# Patient Record
Sex: MALE | Race: WHITE | NOT HISPANIC OR LATINO
[De-identification: names, ages, dates, MRNs, and addresses within clinical notes are randomized per-mention and may not be internally consistent; named-entity substitution may affect disease eponyms.]

---

## 2017-02-22 ENCOUNTER — RX ONLY (OUTPATIENT)
Age: 77
Setting detail: RX ONLY
End: 2017-02-22

## 2017-02-22 PROBLEM — E11.9 TYPE 2 DIABETES MELLITUS WITHOUT COMPLICATIONS: Status: ACTIVE | Noted: 2017-02-22

## 2017-02-22 PROBLEM — C44.519 BASAL CELL CARCINOMA OF SKIN OF OTHER PART OF TRUNK: Status: ACTIVE | Noted: 2017-02-22

## 2017-02-22 PROBLEM — C44.319 BASAL CELL CARCINOMA OF SKIN OF OTHER PARTS OF FACE: Status: ACTIVE | Noted: 2017-02-22

## 2017-03-08 PROBLEM — D49.2 NEOPLASM OF UNSPECIFIED BEHAVIOR OF BONE, SOFT TISSUE, AND SKIN: Status: RESOLVED | Noted: 2017-02-22 | Resolved: 2017-03-08

## 2017-03-24 ENCOUNTER — OFFICE VISIT (OUTPATIENT)
Dept: CARDIOLOGY | Facility: MEDICAL CENTER | Age: 77
End: 2017-03-24
Payer: MEDICARE

## 2017-03-24 VITALS
WEIGHT: 236 LBS | DIASTOLIC BLOOD PRESSURE: 80 MMHG | HEIGHT: 72 IN | SYSTOLIC BLOOD PRESSURE: 130 MMHG | BODY MASS INDEX: 31.97 KG/M2 | HEART RATE: 70 BPM

## 2017-03-24 DIAGNOSIS — E78.2 MIXED HYPERLIPIDEMIA: ICD-10-CM

## 2017-03-24 DIAGNOSIS — I10 ESSENTIAL HYPERTENSION, BENIGN: ICD-10-CM

## 2017-03-24 DIAGNOSIS — R60.1 GENERALIZED EDEMA: ICD-10-CM

## 2017-03-24 DIAGNOSIS — I25.10 CORONARY ARTERY DISEASE INVOLVING NATIVE CORONARY ARTERY OF NATIVE HEART WITHOUT ANGINA PECTORIS: ICD-10-CM

## 2017-03-24 PROCEDURE — G8598 ASA/ANTIPLAT THER USED: HCPCS | Performed by: INTERNAL MEDICINE

## 2017-03-24 PROCEDURE — G8432 DEP SCR NOT DOC, RNG: HCPCS | Performed by: INTERNAL MEDICINE

## 2017-03-24 PROCEDURE — 4040F PNEUMOC VAC/ADMIN/RCVD: CPT | Performed by: INTERNAL MEDICINE

## 2017-03-24 PROCEDURE — 1101F PT FALLS ASSESS-DOCD LE1/YR: CPT | Mod: 8P | Performed by: INTERNAL MEDICINE

## 2017-03-24 PROCEDURE — 99214 OFFICE O/P EST MOD 30 MIN: CPT | Performed by: INTERNAL MEDICINE

## 2017-03-24 PROCEDURE — G8417 CALC BMI ABV UP PARAM F/U: HCPCS | Performed by: INTERNAL MEDICINE

## 2017-03-24 PROCEDURE — 3017F COLORECTAL CA SCREEN DOC REV: CPT | Mod: 8P | Performed by: INTERNAL MEDICINE

## 2017-03-24 PROCEDURE — 1036F TOBACCO NON-USER: CPT | Performed by: INTERNAL MEDICINE

## 2017-03-24 PROCEDURE — G8484 FLU IMMUNIZE NO ADMIN: HCPCS | Performed by: INTERNAL MEDICINE

## 2017-03-24 ASSESSMENT — ENCOUNTER SYMPTOMS
PALPITATIONS: 0
SHORTNESS OF BREATH: 1
HEARTBURN: 0
DIZZINESS: 0
BRUISES/BLEEDS EASILY: 0
BLOOD IN STOOL: 0
HEADACHES: 0
BLURRED VISION: 0
FEVER: 0
DEPRESSION: 0
NECK PAIN: 0
MYALGIAS: 0

## 2017-03-24 NOTE — Clinical Note
[facility]   [address] [place] [address] [address]  [place] [address]  Phone: [phone]  Fax: [phone]              David Figueroa  1940    Encounter Date: 3/24/2017    Khang Stanley M.D.          PROGRESS NOTE:  Subjective:   David Figueroa is a 76 y.o. male who presents today for follow-up of multivessel coronary disease by angiography in March 2015. At that time, he was found to have inoperable CAD do due to lack of targets. The patient has had no angina at all. He is not physically active. He does have problems with dependent edema but it sounds like he drinks a significant amount of fluids at home. He is due for lab testing as well.  The patient finally saw dermatologist to get his squamous cell cancers removed.    Past Medical History   Diagnosis Date   • Hypertension    • Hyperlipidemia    • Paroxysmal atrial fibrillation    • Emphysema      O2 3 liters prn   • Hiatus hernia syndrome    • Asthma    • Other emphysema    • Snoring    • Diabetes      oral meds   • Dental disorder      upper lower   • Pneumonia 2013   • High cholesterol    • Cold 1/2015   • Breath shortness      O2  3 l/m at night   • Stroke 1999   • Weakness      since pneumonia     Past Surgical History   Procedure Laterality Date   • Carotid endarterectomy  9/17/2014     Performed by Willy Solares M.D. at SURGERY [facility]   • Recovery  3/4/2015     Performed by Cath-Recovery Surgery at SURGERY SAME DAY [facility] ORS     No family history on file.  History   Smoking status   • Former Smoker -- 2.00 packs/day for 55 years   • Types: Cigarettes   • Quit date: 07/01/2013   Smokeless tobacco   • Never Used     Allergies   Allergen Reactions   • Other Misc Rash     Cat Hair     Outpatient Encounter Prescriptions as of 3/24/2017   Medication Sig Dispense Refill   • carvedilol (COREG) 3.125 MG Tab Take 1 Tab by mouth 2 times a day, with meals. 180 Tab 3   • metformin ER (GLUCOPHAGE XR)  750 MG TABLET SR 24 HR Take 750 mg by mouth every day.     • potassium chloride SA (K-DUR) 10 MEQ Tab CR Take 1 Tab by mouth every day. TAKE 1 TAB BY MOUTH EVERY DAY. 90 Tab 3   • folic acid (FOLVITE) 400 MCG tablet Take 800 mcg by mouth.  3   • glipiZIDE (GLUCOTROL) 5 MG TABS Take 5 mg by mouth every morning.     • furosemide (LASIX) 40 MG TABS Take 40 mg by mouth every day.     • Cholecalciferol (VITAMIN D-3) 1000 UNITS CAPS Take 1 Cap by mouth every day.     • aspirin 81 MG tablet Take 81 mg by mouth every day.     • atorvastatin (LIPITOR) 10 MG TABS Take 1 Tab by mouth every day. 90 Tab 3   • lisinopril (PRINIVIL) 5 MG TABS Take 1 Tab by mouth every day. 90 Tab 3   • metformin (GLUCOPHAGE) 500 MG TABS Take 500 mg by mouth 2 times a day.     • famotidine (PEPCID) 20 MG TABS Take 20 mg by mouth every day.     • Arformoterol Tartrate (BROVANA) 15 MCG/2ML NEBU Inhale  by mouth. Indications: Emphysema     • budesonide (PULMICORT) 0.25 MG/2ML SUSP 250 mcg by Nebulization route 2 times a day. Indications: Chronic Obstructive Lung Disease     • albuterol (PROVENTIL) 2.5mg/3ml NEBU 2.5 mg by Nebulization route every four hours as needed.       No facility-administered encounter medications on file as of 3/24/2017.     Review of Systems   Constitutional: Positive for malaise/fatigue. Negative for fever.   HENT: Positive for hearing loss.    Eyes: Negative for blurred vision.   Respiratory: Positive for shortness of breath.    Cardiovascular: Positive for leg swelling. Negative for chest pain and palpitations.   Gastrointestinal: Negative for heartburn and blood in stool.   Genitourinary: Negative for hematuria.   Musculoskeletal: Negative for myalgias and neck pain.   Skin: Negative for rash.   Neurological: Negative for dizziness and headaches.   Endo/Heme/Allergies: Does not bruise/bleed easily.   Psychiatric/Behavioral: Negative for depression.        Objective:   /80 mmHg  Pulse 70  Ht 1.829 m (6')  Wt  107.049 kg (236 lb)  BMI 32.00 kg/m2    Physical Exam    Assessment:     1. Coronary artery disease involving native coronary artery of native heart without angina pectoris  LIPID PROFILE    COMP METABOLIC PANEL    CBC WITH DIFFERENTIAL   2. Essential hypertension, benign  LIPID PROFILE    COMP METABOLIC PANEL    CBC WITH DIFFERENTIAL   3. Generalized edema  COMP METABOLIC PANEL    CBC WITH DIFFERENTIAL   4. Mixed hyperlipidemia  COMP METABOLIC PANEL    CBC WITH DIFFERENTIAL       Medical Decision Making:  Today's Assessment / Status / Plan:     He is doing well without any angina. He is due for laboratory testing. Would recommend a target LDL of 70 or less severity of his coronary artery disease. His blood pressures currently under good control. Again, the patient has inoperable CAD. Return 6 months.      Michael Felder M.D.  [address] [facility]  [address] [place] [address]  VIA Facsimile: [phone]

## 2017-03-24 NOTE — PROGRESS NOTES
Subjective:   David Figueroa is a 76 y.o. male who presents today for follow-up of multivessel coronary disease by angiography in March 2015. At that time, he was found to have inoperable CAD do due to lack of targets. The patient has had no angina at all. He is not physically active. He does have problems with dependent edema but it sounds like he drinks a significant amount of fluids at home. He is due for lab testing as well.  The patient finally saw dermatologist to get his squamous cell cancers removed.    Past Medical History   Diagnosis Date   • Hypertension    • Hyperlipidemia    • Paroxysmal atrial fibrillation    • Emphysema      O2 3 liters prn   • Hiatus hernia syndrome    • Asthma    • Other emphysema    • Snoring    • Diabetes      oral meds   • Dental disorder      upper lower   • Pneumonia 2013   • High cholesterol    • Cold 1/2015   • Breath shortness      O2  3 l/m at night   • Stroke 1999   • Weakness      since pneumonia     Past Surgical History   Procedure Laterality Date   • Carotid endarterectomy  9/17/2014     Performed by Willy Solares M.D. at SURGERY [facility] ORS   • Recovery  3/4/2015     Performed by Cath-Recovery Surgery at SURGERY SAME DAY [facility] ORS     No family history on file.  History   Smoking status   • Former Smoker -- 2.00 packs/day for 55 years   • Types: Cigarettes   • Quit date: 07/01/2013   Smokeless tobacco   • Never Used     Allergies   Allergen Reactions   • Other Misc Rash     Cat Hair     Outpatient Encounter Prescriptions as of 3/24/2017   Medication Sig Dispense Refill   • carvedilol (COREG) 3.125 MG Tab Take 1 Tab by mouth 2 times a day, with meals. 180 Tab 3   • metformin ER (GLUCOPHAGE XR) 750 MG TABLET SR 24 HR Take 750 mg by mouth every day.     • potassium chloride SA (K-DUR) 10 MEQ Tab CR Take 1 Tab by mouth every day. TAKE 1 TAB BY MOUTH EVERY DAY. 90 Tab 3   • folic acid (FOLVITE) 400 MCG tablet Take 800 mcg by mouth.  3   • glipiZIDE  (GLUCOTROL) 5 MG TABS Take 5 mg by mouth every morning.     • furosemide (LASIX) 40 MG TABS Take 40 mg by mouth every day.     • Cholecalciferol (VITAMIN D-3) 1000 UNITS CAPS Take 1 Cap by mouth every day.     • aspirin 81 MG tablet Take 81 mg by mouth every day.     • atorvastatin (LIPITOR) 10 MG TABS Take 1 Tab by mouth every day. 90 Tab 3   • lisinopril (PRINIVIL) 5 MG TABS Take 1 Tab by mouth every day. 90 Tab 3   • metformin (GLUCOPHAGE) 500 MG TABS Take 500 mg by mouth 2 times a day.     • famotidine (PEPCID) 20 MG TABS Take 20 mg by mouth every day.     • Arformoterol Tartrate (BROVANA) 15 MCG/2ML NEBU Inhale  by mouth. Indications: Emphysema     • budesonide (PULMICORT) 0.25 MG/2ML SUSP 250 mcg by Nebulization route 2 times a day. Indications: Chronic Obstructive Lung Disease     • albuterol (PROVENTIL) 2.5mg/3ml NEBU 2.5 mg by Nebulization route every four hours as needed.       No facility-administered encounter medications on file as of 3/24/2017.     Review of Systems   Constitutional: Positive for malaise/fatigue. Negative for fever.   HENT: Positive for hearing loss.    Eyes: Negative for blurred vision.   Respiratory: Positive for shortness of breath.    Cardiovascular: Positive for leg swelling. Negative for chest pain and palpitations.   Gastrointestinal: Negative for heartburn and blood in stool.   Genitourinary: Negative for hematuria.   Musculoskeletal: Negative for myalgias and neck pain.   Skin: Negative for rash.   Neurological: Negative for dizziness and headaches.   Endo/Heme/Allergies: Does not bruise/bleed easily.   Psychiatric/Behavioral: Negative for depression.        Objective:   /80 mmHg  Pulse 70  Ht 1.829 m (6')  Wt 107.049 kg (236 lb)  BMI 32.00 kg/m2    Physical Exam    Assessment:     1. Coronary artery disease involving native coronary artery of native heart without angina pectoris  LIPID PROFILE    COMP METABOLIC PANEL    CBC WITH DIFFERENTIAL   2. Essential  hypertension, benign  LIPID PROFILE    COMP METABOLIC PANEL    CBC WITH DIFFERENTIAL   3. Generalized edema  COMP METABOLIC PANEL    CBC WITH DIFFERENTIAL   4. Mixed hyperlipidemia  COMP METABOLIC PANEL    CBC WITH DIFFERENTIAL       Medical Decision Making:  Today's Assessment / Status / Plan:     He is doing well without any angina. He is due for laboratory testing. Would recommend a target LDL of 70 or less severity of his coronary artery disease. His blood pressures currently under good control. Again, the patient has inoperable CAD. Return 6 months.

## 2017-03-24 NOTE — MR AVS SNAPSHOT
David Figueroa   3/24/2017 1:00 PM   Office Visit   MRN: [id number]    Department:  [facility]   Dept Phone:  [phone]    Description:  Male : 1940   Provider:  Khang Stanley M.D.           Reason for Visit     Follow-Up           Allergies as of 3/24/2017     Allergen Noted Reactions    Other Misc 2015   Rash    Cat Hair      You were diagnosed with     Coronary artery disease involving native coronary artery of native heart without angina pectoris   [8140939]       Essential hypertension, benign   [401.1.ICD-9-CM]       Generalized edema   [035633]       Mixed hyperlipidemia   [272.2.ICD-9-CM]         Vital Signs     Blood Pressure Pulse Height Weight Body Mass Index Smoking Status    130/80 mmHg 70 1.829 m (6') 107.049 kg (236 lb) 32.00 kg/m2 Former Smoker      Basic Information     Date Of Birth Sex Race Ethnicity Preferred Language    1940 Male White Non- English      Your appointments     Oct 10, 2017  1:40 PM   FOLLOW UP with Kahng Stanley M.D.   [facility] (--)    [address], [address]  [facility] [address]   [phone]              Problem List              ICD-10-CM Priority Class Noted - Resolved    PAF (paroxysmal atrial fibrillation) (CMS-HCC) I48.0   2013 - Present    Nonspecific abnormal electrocardiogram (ECG) (EKG) R94.31   2013 - Present    Essential hypertension, benign I10   2013 - Present    Diabetes mellitus type 2, noninsulin dependent (CMS-HCC) E11.9   2013 - Present    CVA, old, facial weakness I69.392   2013 - Present    H/O: pneumonia Z87.01   2013 - Present    COPD (chronic obstructive pulmonary disease) (CMS-HCC) J44.9   2014 - Present    Edema R60.9   2014 - Present    Mixed hyperlipidemia E78.2   2014 - Present    Lung nodule R91.1   2014 - Present    Carotid artery bruit R09.89   2014 - Present    Occlusion and stenosis of carotid artery  without mention of cerebral infarction I65.29   9/17/2014 - Present    Other nonspecific abnormal cardiovascular system function study R94.39   3/4/2015 - Present    CAD (coronary artery disease) I25.10   7/1/2015 - Present    Skin cancer of trunk C44.599   9/27/2016 - Present      Health Maintenance        Date Due Completion Dates    A1C SCREENING 1940 ---    DIABETES MONOFILAMENT / LE EXAM 1940 ---    RETINAL SCREENING 6/3/1960 ---    URINE ACR / MICROALBUMIN 6/3/1960 ---    IMM DTaP/Tdap/Td Vaccine (1 - Tdap) 6/3/1961 ---    COLONOSCOPY 6/3/1992 ---    IMM ZOSTER VACCINE 6/3/2002 ---    IMM PNEUMOCOCCAL 65+ (ADULT) LOW/MEDIUM RISK SERIES (2 of 2 - PCV13) 9/18/2013 9/18/2012    FASTING LIPID PROFILE 7/16/2016 7/16/2015    SERUM CREATININE 7/16/2016 7/16/2015, 3/3/2015, 9/11/2014    IMM INFLUENZA (1) 9/1/2016 ---            Current Immunizations     Pneumococcal polysaccharide vaccine (PPSV-23) 9/18/2012      Below and/or attached are the medications your provider expects you to take. Review all of your home medications and newly ordered medications with your provider and/or pharmacist. Follow medication instructions as directed by your provider and/or pharmacist. Please keep your medication list with you and share with your provider. Update the information when medications are discontinued, doses are changed, or new medications (including over-the-counter products) are added; and carry medication information at all times in the event of emergency situations     Allergies:  OTHER MISC - Rash               Medications  Valid as of: March 24, 2017 -  1:38 PM    Generic Name Brand Name Tablet Size Instructions for use    Albuterol Sulfate (Nebu Soln) PROVENTIL 2.5mg/3ml 2.5 mg by Nebulization route every four hours as needed.        Arformoterol Tartrate (Nebu Soln) Arformoterol Tartrate 15 MCG/2ML Inhale  by mouth. Indications: Emphysema        Aspirin (Tab) aspirin 81 MG Take 81 mg by mouth every day.         Atorvastatin Calcium (Tab) LIPITOR 10 MG Take 1 Tab by mouth every day.        Budesonide (Suspension) PULMICORT 0.25 MG/2ML 250 mcg by Nebulization route 2 times a day. Indications: Chronic Obstructive Lung Disease        Carvedilol (Tab) COREG 3.125 MG Take 1 Tab by mouth 2 times a day, with meals.        Cholecalciferol (Cap) Vitamin D-3 1000 UNITS Take 1 Cap by mouth every day.        Famotidine (Tab) PEPCID 20 MG Take 20 mg by mouth every day.        Folic Acid (Tab) FOLVITE 400 MCG Take 800 mcg by mouth.        Furosemide (Tab) LASIX 40 MG Take 40 mg by mouth every day.        GlipiZIDE (Tab) GLUCOTROL 5 MG Take 5 mg by mouth every morning.        Lisinopril (Tab) PRINIVIL 5 MG Take 1 Tab by mouth every day.        MetFORMIN HCl (Tab) GLUCOPHAGE 500 MG Take 500 mg by mouth 2 times a day.        MetFORMIN HCl (TABLET SR 24 HR) GLUCOPHAGE  MG Take 750 mg by mouth every day.        Potassium Chloride Nishi CR (Tab CR) K-DUR 10 MEQ Take 1 Tab by mouth every day. TAKE 1 TAB BY MOUTH EVERY DAY.        .                 Medicines prescribed today were sent to:     [facility] [address] [address]    Phone: [phone] Fax: [phone]    Open 24 Hours?: No      Medication refill instructions:       If your prescription bottle indicates you have medication refills left, it is not necessary to call your provider’s office. Please contact your pharmacy and they will refill your medication.    If your prescription bottle indicates you do not have any refills left, you may request refills at any time through one of the following ways: The online [employer] system (except Urgent Care), by calling your provider’s office, or by asking your pharmacy to contact your provider’s office with a refill request. Medication refills are processed only during regular business hours and may not be available until the next business day. Your provider  may request additional information or to have a follow-up visit with you prior to refilling your medication.   *Please Note: Medication refills are assigned a new Rx number when refilled electronically. Your pharmacy may indicate that no refills were authorized even though a new prescription for the same medication is available at the pharmacy. Please request the medicine by name with the pharmacy before contacting your provider for a refill.        Your To Do List     Future Labs/Procedures Complete By Expires    CBC WITH DIFFERENTIAL  As directed 9/23/2017    COMP METABOLIC PANEL  As directed 9/23/2017    LIPID PROFILE  As directed 9/23/2017         [employer] Network Access Code: [email or web address]  Expires: 4/23/2017  1:38 PM    [employer] Network  A secure, online tool to manage your health information     [employer]’s [employer] Network® is a secure, online tool that connects you to your personalized health information from the privacy of your home -- day or night - making it very easy for you to manage your healthcare. Once the activation process is completed, you can even access your medical information using the Start[employer] Network panfilo, which is available for free in the Apple Panfilo store or Google Play store.     Start[employer] Network provides the following levels of access (as shown below):   My Chart Features   [facility] Doctor [facility]  Urgent  Care Non-[facility]  Primary Care  Doctor   Email your healthcare team securely and privately 24/7 X X X    Manage appointments: schedule your next appointment; view details of past/upcoming appointments X      Request prescription refills. X      View recent personal medical records, including lab and immunizations X X X X   View health record, including health history, allergies, medications X X X X   Read reports about your outpatient visits, procedures, consult and ER notes X X X X   See your discharge summary, which is a recap of your hospital and/or ER visit that includes your diagnosis, lab  results, and care plan. X X       How to register for [employer]ovation:  1. Go to  [email or web address].  2. Click on the Sign Up Now box, which takes you to the New Member Sign Up page. You will need to provide the following information:  a. Enter your [employer] Access Code exactly as it appears at the top of this page. (You will not need to use this code after you’ve completed the sign-up process. If you do not sign up before the expiration date, you must request a new code.)   b. Enter your date of birth.   c. Enter your home email address.   d. Click Submit, and follow the next screen’s instructions.  3. Create a [employer] ID. This will be your iovation login ID and cannot be changed, so think of one that is secure and easy to remember.  4. Create a [employer] password. You can change your password at any time.  5. Enter your Password Reset Question and Answer. This can be used at a later time if you forget your password.   6. Enter your e-mail address. This allows you to receive e-mail notifications when new information is available in iovation.  7. Click Sign Up. You can now view your health information.    For assistance activating your iovation account, call [phone]

## 2017-04-17 ENCOUNTER — RX ONLY (OUTPATIENT)
Age: 77
Setting detail: RX ONLY
End: 2017-04-17

## 2017-09-20 LAB
ALBUMIN SERPL-MCNC: 3.5 G/DL (ref 3.5–4.8)
ALBUMIN/GLOB SERPL: 1.3 {RATIO} (ref 1.2–2.2)
ALP SERPL-CCNC: 90 IU/L (ref 39–117)
ALT SERPL-CCNC: 9 IU/L (ref 0–44)
AST SERPL-CCNC: 13 IU/L (ref 0–40)
BASOPHILS # BLD AUTO: 0 X10E3/UL (ref 0–0.2)
BASOPHILS NFR BLD AUTO: 0 %
BILIRUB SERPL-MCNC: <0.2 MG/DL (ref 0–1.2)
BUN SERPL-MCNC: 25 MG/DL (ref 8–27)
BUN/CREAT SERPL: 21 (ref 10–24)
CALCIUM SERPL-MCNC: 9.2 MG/DL (ref 8.6–10.2)
CHLORIDE SERPL-SCNC: 101 MMOL/L (ref 96–106)
CHOLEST SERPL-MCNC: 147 MG/DL (ref 100–199)
CO2 SERPL-SCNC: 25 MMOL/L (ref 18–29)
COMMENT 011824: NORMAL
CREAT SERPL-MCNC: 1.17 MG/DL (ref 0.76–1.27)
EOSINOPHIL # BLD AUTO: 0.3 X10E3/UL (ref 0–0.4)
EOSINOPHIL NFR BLD AUTO: 4 %
ERYTHROCYTE [DISTWIDTH] IN BLOOD BY AUTOMATED COUNT: 17.4 % (ref 12.3–15.4)
GLOBULIN SER CALC-MCNC: 2.6 G/DL (ref 1.5–4.5)
GLUCOSE SERPL-MCNC: 94 MG/DL (ref 65–99)
HCT VFR BLD AUTO: 36.6 % (ref 37.5–51)
HDLC SERPL-MCNC: 43 MG/DL
HGB BLD-MCNC: 11.6 G/DL (ref 12.6–17.7)
IMM GRANULOCYTES # BLD: 0 X10E3/UL (ref 0–0.1)
IMM GRANULOCYTES NFR BLD: 0 %
IMMATURE CELLS  115398: ABNORMAL
LDLC SERPL CALC-MCNC: 76 MG/DL (ref 0–99)
LYMPHOCYTES # BLD AUTO: 2.8 X10E3/UL (ref 0.7–3.1)
LYMPHOCYTES NFR BLD AUTO: 35 %
MCH RBC QN AUTO: 29.7 PG (ref 26.6–33)
MCHC RBC AUTO-ENTMCNC: 31.7 G/DL (ref 31.5–35.7)
MCV RBC AUTO: 94 FL (ref 79–97)
MONOCYTES # BLD AUTO: 0.8 X10E3/UL (ref 0.1–0.9)
MONOCYTES NFR BLD AUTO: 10 %
MORPHOLOGY BLD-IMP: ABNORMAL
NEUTROPHILS # BLD AUTO: 4.1 X10E3/UL (ref 1.4–7)
NEUTROPHILS NFR BLD AUTO: 51 %
NRBC BLD AUTO-RTO: ABNORMAL %
PLATELET # BLD AUTO: 231 X10E3/UL (ref 150–379)
POTASSIUM SERPL-SCNC: 4.2 MMOL/L (ref 3.5–5.2)
PROT SERPL-MCNC: 6.1 G/DL (ref 6–8.5)
RBC # BLD AUTO: 3.91 X10E6/UL (ref 4.14–5.8)
SODIUM SERPL-SCNC: 143 MMOL/L (ref 134–144)
TRIGL SERPL-MCNC: 141 MG/DL (ref 0–149)
VLDLC SERPL CALC-MCNC: 28 MG/DL (ref 5–40)
WBC # BLD AUTO: 8 X10E3/UL (ref 3.4–10.8)

## 2017-10-02 ENCOUNTER — TELEPHONE (OUTPATIENT)
Dept: CARDIOLOGY | Facility: MEDICAL CENTER | Age: 77
End: 2017-10-02

## 2017-10-02 NOTE — TELEPHONE ENCOUNTER
----- Message from Angella Christina R.N. sent at 10/2/2017  8:06 AM PDT -----      ----- Message -----  From: Khang Stanley M.D.  Sent: 9/30/2017   5:01 PM  To: Angella Christina R.N.    Labs reviewed. Mild anemia. LDL is on target at 76. Same medications.  ----- Message -----  From: Angella Christina R.N.  Sent: 9/20/2017   8:27 AM  To: Khang Stanley M.D.    FV 10/10/17

## 2017-10-10 ENCOUNTER — OFFICE VISIT (OUTPATIENT)
Dept: CARDIOLOGY | Facility: MEDICAL CENTER | Age: 77
End: 2017-10-10
Payer: MEDICARE

## 2017-10-10 VITALS
SYSTOLIC BLOOD PRESSURE: 130 MMHG | DIASTOLIC BLOOD PRESSURE: 80 MMHG | HEIGHT: 72 IN | WEIGHT: 213 LBS | HEART RATE: 72 BPM | BODY MASS INDEX: 28.85 KG/M2

## 2017-10-10 DIAGNOSIS — I25.10 CORONARY ARTERY DISEASE INVOLVING NATIVE CORONARY ARTERY OF NATIVE HEART WITHOUT ANGINA PECTORIS: ICD-10-CM

## 2017-10-10 DIAGNOSIS — E11.9 DIABETES MELLITUS TYPE 2, NONINSULIN DEPENDENT (HCC): ICD-10-CM

## 2017-10-10 DIAGNOSIS — E78.2 MIXED HYPERLIPIDEMIA: ICD-10-CM

## 2017-10-10 DIAGNOSIS — I10 ESSENTIAL HYPERTENSION, BENIGN: ICD-10-CM

## 2017-10-10 PROCEDURE — 99213 OFFICE O/P EST LOW 20 MIN: CPT | Performed by: INTERNAL MEDICINE

## 2017-10-10 RX ORDER — ATORVASTATIN CALCIUM 20 MG/1
20 TABLET, FILM COATED ORAL DAILY
Qty: 90 TAB | Refills: 3 | Status: SHIPPED | OUTPATIENT
Start: 2017-10-10

## 2017-10-10 RX ORDER — METOLAZONE 2.5 MG/1
2.5 TABLET ORAL DAILY
Qty: 15 TAB | Refills: 3 | Status: SHIPPED | OUTPATIENT
Start: 2017-10-10 | End: 2017-10-10 | Stop reason: SDUPTHER

## 2017-10-10 RX ORDER — METOLAZONE 2.5 MG/1
2.5 TABLET ORAL DAILY
Qty: 15 TAB | Refills: 3 | Status: SHIPPED | OUTPATIENT
Start: 2017-10-10 | End: 2017-11-02 | Stop reason: SDUPTHER

## 2017-10-10 ASSESSMENT — ENCOUNTER SYMPTOMS
BRUISES/BLEEDS EASILY: 0
HEARTBURN: 0
BLOOD IN STOOL: 0
DEPRESSION: 0
BLURRED VISION: 0
PALPITATIONS: 0
MYALGIAS: 0
NECK PAIN: 0
SHORTNESS OF BREATH: 1
FEVER: 0
HEADACHES: 0
DIZZINESS: 0

## 2017-10-10 NOTE — PROGRESS NOTES
Subjective:   David Figueroa is a 76 y.o. male who presents today for follow-up of multivessel coronary disease by angiography in March 2015. At that time, he was found to have inoperable CAD do due to lack of targets.  He's had no angina. EF was preserved by most recent echo. Patient is having a difficult time with a left foot ulcerand undergoing hyperbaric therapy and also had a stem cell transplant. Recent laboratory reviewed with him and LDL was not at target.  He has chronic edema which has been a problem and is retarded healing.    Past Medical History:   Diagnosis Date   • Cold 1/2015   • Pneumonia 2013   • Stroke (CMS-HCC) 1999   • Asthma    • Breath shortness     O2  3 l/m at night   • Dental disorder     upper lower   • Diabetes     oral meds   • Emphysema     O2 3 liters prn   • Hiatus hernia syndrome    • High cholesterol    • Hyperlipidemia    • Hypertension    • Other emphysema (CMS-HCC)    • Paroxysmal atrial fibrillation (CMS-HCC)    • Snoring    • Weakness     since pneumonia     Past Surgical History:   Procedure Laterality Date   • RECOVERY  3/4/2015    Performed by Cath-Recovery Surgery at SURGERY SAME DAY [facility] ORS   • CAROTID ENDARTERECTOMY  9/17/2014    Performed by Willy Solares M.D. at SURGERY [facility] ORS     History reviewed. No pertinent family history.  History   Smoking Status   • Former Smoker   • Packs/day: 2.00   • Years: 55.00   • Types: Cigarettes   • Quit date: 7/1/2013   Smokeless Tobacco   • Never Used     Allergies   Allergen Reactions   • Other Misc Rash     Cat Hair     Outpatient Encounter Prescriptions as of 10/10/2017   Medication Sig Dispense Refill   • atorvastatin (LIPITOR) 20 MG Tab Take 1 Tab by mouth every day. 90 Tab 3   • metolazone (ZAROXOLYN) 2.5 MG Tab Take 1 Tab by mouth every day. 15 Tab 3   • carvedilol (COREG) 3.125 MG Tab Take 1 Tab by mouth 2 times a day, with meals. 180 Tab 3   • metformin ER (GLUCOPHAGE XR) 750 MG TABLET SR 24 HR Take 750 mg  by mouth every day.     • potassium chloride SA (K-DUR) 10 MEQ Tab CR Take 1 Tab by mouth every day. TAKE 1 TAB BY MOUTH EVERY DAY. 90 Tab 3   • folic acid (FOLVITE) 400 MCG tablet Take 800 mcg by mouth.  3   • glipiZIDE (GLUCOTROL) 5 MG TABS Take 5 mg by mouth every morning.     • furosemide (LASIX) 40 MG TABS Take 40 mg by mouth every day.     • Cholecalciferol (VITAMIN D-3) 1000 UNITS CAPS Take 1 Cap by mouth every day.     • aspirin 81 MG tablet Take 81 mg by mouth every day.     • lisinopril (PRINIVIL) 5 MG TABS Take 1 Tab by mouth every day. 90 Tab 3   • metformin (GLUCOPHAGE) 500 MG TABS Take 500 mg by mouth 2 times a day.     • famotidine (PEPCID) 20 MG TABS Take 20 mg by mouth every day.     • Arformoterol Tartrate (BROVANA) 15 MCG/2ML NEBU Inhale  by mouth. Indications: Emphysema     • budesonide (PULMICORT) 0.25 MG/2ML SUSP 250 mcg by Nebulization route 2 times a day. Indications: Chronic Obstructive Lung Disease     • albuterol (PROVENTIL) 2.5mg/3ml NEBU 2.5 mg by Nebulization route every four hours as needed.     • [DISCONTINUED] metolazone (ZAROXOLYN) 2.5 MG Tab Take 1 Tab by mouth every day. 15 Tab 3   • [DISCONTINUED] atorvastatin (LIPITOR) 10 MG TABS Take 1 Tab by mouth every day. 90 Tab 3     No facility-administered encounter medications on file as of 10/10/2017.      Review of Systems   Constitutional: Positive for malaise/fatigue. Negative for fever.   HENT: Positive for hearing loss.    Eyes: Negative for blurred vision.   Respiratory: Positive for shortness of breath.    Cardiovascular: Positive for leg swelling. Negative for chest pain and palpitations.   Gastrointestinal: Negative for blood in stool and heartburn.   Genitourinary: Negative for hematuria.   Musculoskeletal: Negative for myalgias and neck pain.   Skin: Negative for rash.   Neurological: Negative for dizziness and headaches.   Endo/Heme/Allergies: Does not bruise/bleed easily.   Psychiatric/Behavioral: Negative for depression.         Objective:   /80   Pulse 72   Ht 1.829 m (6')   Wt 96.6 kg (213 lb)   BMI 28.89 kg/m²     Physical Exam   Constitutional: He is oriented to person, place, and time. No distress.   HENT:   Head: Normocephalic and atraumatic.   Eyes: Conjunctivae are normal. No scleral icterus.   Neck: No JVD present.   Cardiovascular: Normal rate and regular rhythm.    Pulmonary/Chest: He has no wheezes. He has no rales.   Decreased breath sounds   Abdominal: Soft. He exhibits no mass. There is no tenderness.   Large umbilical hernia  Obese   Musculoskeletal: He exhibits edema.   2+ edema right. Left leg is wrapped.   Neurological: He is oriented to person, place, and time.   Skin: Skin is warm and dry.   1 cm lesion just to the right of midline in the mid back suspicious of squamous cell cancer.   Psychiatric: He has a normal mood and affect.       Assessment:     1. Coronary artery disease involving native coronary artery of native heart without angina pectoris     2. Essential hypertension, benign     3. Mixed hyperlipidemia  atorvastatin (LIPITOR) 20 MG Tab   4. Diabetes mellitus type 2, noninsulin dependent (CMS-HCC)         Medical Decision Making:  Today's Assessment / Status / Plan:   The patient has had no angina. As noted above he has inoperable CAD.  His edema is multifactorial as LV function is well-preserved. It doesn't sound like he is following his diet very closely. We'll add metolazone twice weekly to his regimen and he was given written instructions take an extra potassium with each metolazone dose. This should hopefully reduce his edema and speed healing. Otherwise continue medicine same. Return 2 months for reevaluation.

## 2017-11-02 ENCOUNTER — OFFICE VISIT (OUTPATIENT)
Dept: CARDIOLOGY | Facility: MEDICAL CENTER | Age: 77
End: 2017-11-02
Payer: MEDICARE

## 2017-11-02 VITALS
HEIGHT: 72 IN | HEART RATE: 80 BPM | BODY MASS INDEX: 29.8 KG/M2 | WEIGHT: 220 LBS | SYSTOLIC BLOOD PRESSURE: 102 MMHG | DIASTOLIC BLOOD PRESSURE: 66 MMHG | OXYGEN SATURATION: 91 %

## 2017-11-02 DIAGNOSIS — R60.0 LOCALIZED EDEMA: ICD-10-CM

## 2017-11-02 DIAGNOSIS — J44.9 CHRONIC OBSTRUCTIVE PULMONARY DISEASE, UNSPECIFIED COPD TYPE (HCC): ICD-10-CM

## 2017-11-02 DIAGNOSIS — R06.02 SHORTNESS OF BREATH: Primary | ICD-10-CM

## 2017-11-02 DIAGNOSIS — I48.0 PAF (PAROXYSMAL ATRIAL FIBRILLATION) (HCC): ICD-10-CM

## 2017-11-02 DIAGNOSIS — I25.10 CORONARY ARTERY DISEASE INVOLVING NATIVE CORONARY ARTERY OF NATIVE HEART WITHOUT ANGINA PECTORIS: ICD-10-CM

## 2017-11-02 DIAGNOSIS — E78.2 MIXED HYPERLIPIDEMIA: ICD-10-CM

## 2017-11-02 DIAGNOSIS — E11.9 DIABETES MELLITUS TYPE 2, NONINSULIN DEPENDENT (HCC): ICD-10-CM

## 2017-11-02 DIAGNOSIS — I10 ESSENTIAL HYPERTENSION, BENIGN: ICD-10-CM

## 2017-11-02 PROCEDURE — 93000 ELECTROCARDIOGRAM COMPLETE: CPT | Performed by: NURSE PRACTITIONER

## 2017-11-02 PROCEDURE — 99214 OFFICE O/P EST MOD 30 MIN: CPT | Performed by: NURSE PRACTITIONER

## 2017-11-02 RX ORDER — CARVEDILOL 3.12 MG/1
3.12 TABLET ORAL 2 TIMES DAILY WITH MEALS
Qty: 180 TAB | Refills: 3 | Status: ON HOLD | OUTPATIENT
Start: 2017-11-02 | End: 2019-07-08

## 2017-11-02 RX ORDER — METOLAZONE 2.5 MG/1
2.5 TABLET ORAL
Qty: 30 TAB | Refills: 11 | Status: SHIPPED | OUTPATIENT
Start: 2017-11-02 | End: 2019-06-25

## 2017-11-02 RX ORDER — FUROSEMIDE 40 MG/1
40 TABLET ORAL DAILY
Qty: 90 TAB | Refills: 3 | Status: SHIPPED | OUTPATIENT
Start: 2017-11-02 | End: 2018-12-07 | Stop reason: SDUPTHER

## 2017-11-02 RX ORDER — LISINOPRIL 5 MG/1
5 TABLET ORAL DAILY
Qty: 90 TAB | Refills: 3 | Status: SHIPPED | OUTPATIENT
Start: 2017-11-02 | End: 2019-06-25

## 2017-11-02 RX ORDER — POTASSIUM CHLORIDE 750 MG/1
10 TABLET, EXTENDED RELEASE ORAL
Qty: 100 TAB | Refills: 3 | Status: SHIPPED | OUTPATIENT
Start: 2017-11-02 | End: 2019-01-11 | Stop reason: SDUPTHER

## 2017-11-02 ASSESSMENT — ENCOUNTER SYMPTOMS
ORTHOPNEA: 0
SHORTNESS OF BREATH: 1
MYALGIAS: 0
COUGH: 0
SPUTUM PRODUCTION: 1
WEAKNESS: 0
ABDOMINAL PAIN: 0
PND: 0
FEVER: 0
DIZZINESS: 0
PALPITATIONS: 0
CHILLS: 0
CLAUDICATION: 0

## 2017-11-02 NOTE — LETTER
[facility]   [address] [place] [address]  [place] [address]  Phone: [phone]  Fax: [phone]              David Figueroa  1940    Encounter Date: 11/2/2017    SIMEON Caba          PROGRESS NOTE:  Subjective:   David Figueroa is a 77 y.o. male who presents today With his wife, Loida, to be evaluated for shortness of breath.    He has a left foot ulcer and is being seen at the hyperbaric treatment center down in [place]. Today he had to stop walking due to shortness of breath on his way into the building. He often has shortness of breath with exertion but usually does not have to stop when he does this walk.    The technician at the bariatric Center had the Dr. Wilson to the patient's lungs who heard rales apparently. Chest x-ray was ordered and done down in [place]. Patient was instructed to follow-up with us.    He is here today complaining of more than his usual amount of shortness of breath he states he has a cough productive of clear to white sputum usually once a day. He denies fevers.    He denies any chest tightness, heaviness or pressure. No palpitations. He denies any ankle edema. No orthopnea or PND.    His wife is concerned as the patient had pneumonia in the past and had to be hospitalized. Patient has a diagnosis of COPD and a history of smoking for 55 years. He does not smoke currently.    Past Medical History:   Diagnosis Date   • Asthma    • Breath shortness     O2  3 l/m at night   • Cold 1/2015   • Dental disorder     upper lower   • Diabetes     oral meds   • Emphysema     O2 3 liters prn   • Hiatus hernia syndrome    • High cholesterol    • Hyperlipidemia    • Hypertension    • Other emphysema (CMS-HCC)    • Paroxysmal atrial fibrillation (CMS-HCC)    • Pneumonia 2013   • Snoring    • Stroke (CMS-HCC) 1999   • Weakness     since pneumonia     Past Surgical History:   Procedure Laterality Date   • RECOVERY   3/4/2015    Performed by Cath-Recovery Surgery at SURGERY SAME DAY [facility] ORS   • CAROTID ENDARTERECTOMY  9/17/2014    Performed by Willy Solares M.D. at SURGERY [facility] ORS   • CARDIAC CATH      inoperable disease.     History reviewed. No pertinent family history.  History   Smoking Status   • Former Smoker   • Packs/day: 2.00   • Years: 55.00   • Types: Cigarettes   • Quit date: 7/1/2013   Smokeless Tobacco   • Never Used     Allergies   Allergen Reactions   • Other Misc Rash     Cat Hair     Outpatient Encounter Prescriptions as of 11/2/2017   Medication Sig Dispense Refill   • carvedilol (COREG) 3.125 MG Tab Take 1 Tab by mouth 2 times a day, with meals. 180 Tab 3   • potassium chloride SA (K-DUR) 10 MEQ Tab CR Take 1 Tab by mouth every day. Take additional tablet when taking metolazone. 100 Tab 3   • furosemide (LASIX) 40 MG Tab Take 1 Tab by mouth every day. 90 Tab 3   • lisinopril (PRINIVIL) 5 MG Tab Take 1 Tab by mouth every day. 90 Tab 3   • metolazone (ZAROXOLYN) 2.5 MG Tab Take 1 Tab by mouth 2X A WEEK. 30 Tab 11   • metformin ER (GLUCOPHAGE XR) 750 MG TABLET SR 24 HR Take 750 mg by mouth every day.     • glipiZIDE (GLUCOTROL) 5 MG TABS Take 5 mg by mouth every morning.     • Cholecalciferol (VITAMIN D-3) 1000 UNITS CAPS Take 1 Cap by mouth every day.     • aspirin 81 MG tablet Take 81 mg by mouth every day.     • metformin (GLUCOPHAGE) 500 MG TABS Take 500 mg by mouth 2 times a day.     • famotidine (PEPCID) 20 MG TABS Take 20 mg by mouth every day.     • Arformoterol Tartrate (BROVANA) 15 MCG/2ML NEBU Inhale  by mouth. Indications: Emphysema     • budesonide (PULMICORT) 0.25 MG/2ML SUSP 250 mcg by Nebulization route 2 times a day. Indications: Chronic Obstructive Lung Disease     • albuterol (PROVENTIL) 2.5mg/3ml NEBU 2.5 mg by Nebulization route every four hours as needed.     • atorvastatin (LIPITOR) 20 MG Tab Take 1 Tab by mouth every day. 90 Tab 3   • [DISCONTINUED] metolazone (ZAROXOLYN)  "2.5 MG Tab Take 1 Tab by mouth every day. 15 Tab 3   • [DISCONTINUED] carvedilol (COREG) 3.125 MG Tab Take 1 Tab by mouth 2 times a day, with meals. 180 Tab 3   • [DISCONTINUED] potassium chloride SA (K-DUR) 10 MEQ Tab CR Take 1 Tab by mouth every day. TAKE 1 TAB BY MOUTH EVERY DAY. 90 Tab 3   • [DISCONTINUED] folic acid (FOLVITE) 400 MCG tablet Take 800 mcg by mouth.  3   • [DISCONTINUED] furosemide (LASIX) 40 MG TABS Take 40 mg by mouth every day.     • [DISCONTINUED] lisinopril (PRINIVIL) 5 MG TABS Take 1 Tab by mouth every day. 90 Tab 3     No facility-administered encounter medications on file as of 11/2/2017.      Review of Systems   Constitutional: Negative for chills, fever and malaise/fatigue.   Respiratory: Positive for sputum production (small amount of white sputum usually first thing in the morning.) and shortness of breath (exertion.). Negative for cough.    Cardiovascular: Negative for chest pain, palpitations, orthopnea, claudication, leg swelling and PND.   Gastrointestinal: Negative for abdominal pain.   Musculoskeletal: Negative for myalgias.   Neurological: Negative for dizziness and weakness.        Objective:   /66   Pulse 80   Ht 1.829 m (6' 0.01\")   Wt 99.8 kg (220 lb)   SpO2 91%   BMI 29.83 kg/m²      Physical Exam   Constitutional: He is oriented to person, place, and time. He appears well-developed and well-nourished.   HENT:   Head: Normocephalic.   Eyes: Conjunctivae are normal.   Neck: No JVD present. No thyromegaly present.   Cardiovascular: Normal rate, regular rhythm and normal heart sounds.    Pulmonary/Chest: Effort normal. He has no wheezes. He has rhonchi (scattered rhonchi particularly in the left base.). He has no rales.   Abdominal: Soft. Bowel sounds are normal. He exhibits no distension. There is no tenderness.   Musculoskeletal: He exhibits edema (trace to 1+ right ankle.).   Left foot wrapped with dressings in an orthopedic shoe.   Neurological: He is alert and " oriented to person, place, and time.   Skin: Skin is warm and dry.   Psychiatric: He has a normal mood and affect.     Results for TATYANA AMIN (MRN [id number]) as of 11/2/2017 15:04   Ref. Range 9/19/2017 14:05   Sodium Latest Ref Range: 134 - 144 mmol/L 143   Potassium Latest Ref Range: 3.5 - 5.2 mmol/L 4.2   Chloride Latest Ref Range: 96 - 106 mmol/L 101   Co2 Latest Ref Range: 18 - 29 mmol/L 25   Glucose Latest Ref Range: 65 - 99 mg/dL 94   Bun Latest Ref Range: 8 - 27 mg/dL 25   Creatinine Latest Ref Range: 0.76 - 1.27 mg/dL 1.17   GFR If  Latest Ref Range: >59 mL/min/1.73 70   GFR If Non  Latest Ref Range: >59 mL/min/1.73 61   Bun-Creatinine Ratio Latest Ref Range: 10 - 24  21   Calcium Latest Ref Range: 8.6 - 10.2 mg/dL 9.2   AST(SGOT) Latest Ref Range: 0 - 40 IU/L 13   ALT(SGPT) Latest Ref Range: 0 - 44 IU/L 9   Alkaline Phosphatase Latest Ref Range: 39 - 117 IU/L 90   Total Bilirubin Latest Ref Range: 0.0 - 1.2 mg/dL <0.2   Albumin Latest Ref Range: 3.5 - 4.8 g/dL 3.5   Total Protein Latest Ref Range: 6.0 - 8.5 g/dL 6.1   Globulin Latest Ref Range: 1.5 - 4.5 g/dL 2.6   A-G Ratio Latest Ref Range: 1.2 - 2.2  1.3   Cholesterol,Tot Latest Ref Range: 100 - 199 mg/dL 147   Triglycerides Latest Ref Range: 0 - 149 mg/dL 141   HDL Latest Ref Range: >39 mg/dL 43   LDL Latest Ref Range: 0 - 99 mg/dL 76   VLDL Cholesterol Calc Latest Ref Range: 5 - 40 mg/dL 28 December 13, 2013: Echocardiogram:   Normal left ventricular systolic function.  Left ventricular ejection fraction is 65% to 70%.  Mild concentric left ventricular hypertrophy.  Grade I diastolic dysfunction is present.  Aortic sclerosis without stenosis.  Mitral annular calcification.  Mildly Calcified/thickened mitral valve leaflets.    Today: EKG is sinus rhythm without ectopy. No ST elevations.     Today: Chest x-ray shows hyperexpansion of the lungs. No consolidation or edema. No effusion or pneumothorax. Mild  COPD.    Assessment:     1. Shortness of breath  ECHOCARDIOGRAM COMP W/O CONT   2. Chronic obstructive pulmonary disease, unspecified COPD type (CMS-HCC)     3. PAF (paroxysmal atrial fibrillation) (CMS-HCC)  [facility] EKG (Clinic Performed)   4. Coronary artery disease involving native coronary artery of native heart without angina pectoris      inoperable disease   5. Localized edema  potassium chloride SA (K-DUR) 10 MEQ Tab CR   6. Essential hypertension, benign  carvedilol (COREG) 3.125 MG Tab    lisinopril (PRINIVIL) 5 MG Tab   7. Mixed hyperlipidemia  LIPID PROFILE    COMP METABOLIC PANEL   8. Diabetes mellitus type 2, noninsulin dependent (CMS-HCC)  HEMOGLOBIN A1C (Glycohemoglobin GHB Total/A1C with MBG Estimate)       Medical Decision Making:  Today's Assessment / Status / Plan:     Shortness of breath: Probably related to COPD as he has a long history of smoking and carries a diagnosis of COPD. He has some rhonchi in the left base but no pneumonia on chest x-ray.    Possibly he has had a reduction in his ejection fraction since his last echocardiogram in 2013. I do not see any significant heart failure or fluid overload signs but we will do an echocardiogram to evaluate his ejection fraction.    Paroxysmal atrial fibrillation: Patient is in a regular rhythm today.    Coronary artery disease: Patient has no anginal symptoms. EKG shows no ischemia. His coronary artery disease has been deemed inoperable S he has no targets for revascularization.    Edema: He does not have significant edema. I am unable to evaluate his left lower extremity as he has a dressing in place.    Hyperlipidemia: He will be due for lipids metabolic panel at his next appointment.    Diabetes: Since we are checking labs as a courtesy I will order hemoglobin A1c. Diabetes to be managed by other providers.    Patient would like to continue undergoing hyperbaric treatments for his lower extremity. Per a note from the technician at the  hyperbaric center he will need a written release. The patient may return to his hyperbaric treatments.    Seems like his shortness of breath is lung disease. He is not currently treating his COPD with medications or inhalers. I've encouraged him to follow-up with his primary care in order to get his COPD adequately treated.    The patient will follow-up as scheduled with Dr. Stanley on January 3, 2018. He will follow-up sooner if problems.    Collaborating Provider: Dr. Kait Olivier.            No Recipients

## 2017-11-02 NOTE — PROGRESS NOTES
Subjective:   David Figueroa is a 77 y.o. male who presents today With his wife, Loida, to be evaluated for shortness of breath.    He has a left foot ulcer and is being seen at the hyperbaric treatment center down in [place]. Today he had to stop walking due to shortness of breath on his way into the building. He often has shortness of breath with exertion but usually does not have to stop when he does this walk.    The technician at the bariatric Center had the Dr. Wilson to the patient's lungs who heard rales apparently. Chest x-ray was ordered and done down in [place]. Patient was instructed to follow-up with us.    He is here today complaining of more than his usual amount of shortness of breath he states he has a cough productive of clear to white sputum usually once a day. He denies fevers.    He denies any chest tightness, heaviness or pressure. No palpitations. He denies any ankle edema. No orthopnea or PND.    His wife is concerned as the patient had pneumonia in the past and had to be hospitalized. Patient has a diagnosis of COPD and a history of smoking for 55 years. He does not smoke currently.    Past Medical History:   Diagnosis Date   • Asthma    • Breath shortness     O2  3 l/m at night   • Cold 1/2015   • Dental disorder     upper lower   • Diabetes     oral meds   • Emphysema     O2 3 liters prn   • Hiatus hernia syndrome    • High cholesterol    • Hyperlipidemia    • Hypertension    • Other emphysema (CMS-HCC)    • Paroxysmal atrial fibrillation (CMS-[facility])    • Pneumonia 2013   • Snoring    • Stroke (CMS-[facility]) 1999   • Weakness     since pneumonia     Past Surgical History:   Procedure Laterality Date   • RECOVERY  3/4/2015    Performed by Cath-Recovery Surgery at SURGERY SAME DAY [facility] ORS   • CAROTID ENDARTERECTOMY  9/17/2014    Performed by Willy Solares M.D. at SURGERY [facility] ORS   • CARDIAC CATH      inoperable disease.     History reviewed. No pertinent family  history.  History   Smoking Status   • Former Smoker   • Packs/day: 2.00   • Years: 55.00   • Types: Cigarettes   • Quit date: 7/1/2013   Smokeless Tobacco   • Never Used     Allergies   Allergen Reactions   • Other Misc Rash     Cat Hair     Outpatient Encounter Prescriptions as of 11/2/2017   Medication Sig Dispense Refill   • carvedilol (COREG) 3.125 MG Tab Take 1 Tab by mouth 2 times a day, with meals. 180 Tab 3   • potassium chloride SA (K-DUR) 10 MEQ Tab CR Take 1 Tab by mouth every day. Take additional tablet when taking metolazone. 100 Tab 3   • furosemide (LASIX) 40 MG Tab Take 1 Tab by mouth every day. 90 Tab 3   • lisinopril (PRINIVIL) 5 MG Tab Take 1 Tab by mouth every day. 90 Tab 3   • metolazone (ZAROXOLYN) 2.5 MG Tab Take 1 Tab by mouth 2X A WEEK. 30 Tab 11   • metformin ER (GLUCOPHAGE XR) 750 MG TABLET SR 24 HR Take 750 mg by mouth every day.     • glipiZIDE (GLUCOTROL) 5 MG TABS Take 5 mg by mouth every morning.     • Cholecalciferol (VITAMIN D-3) 1000 UNITS CAPS Take 1 Cap by mouth every day.     • aspirin 81 MG tablet Take 81 mg by mouth every day.     • metformin (GLUCOPHAGE) 500 MG TABS Take 500 mg by mouth 2 times a day.     • famotidine (PEPCID) 20 MG TABS Take 20 mg by mouth every day.     • Arformoterol Tartrate (BROVANA) 15 MCG/2ML NEBU Inhale  by mouth. Indications: Emphysema     • budesonide (PULMICORT) 0.25 MG/2ML SUSP 250 mcg by Nebulization route 2 times a day. Indications: Chronic Obstructive Lung Disease     • albuterol (PROVENTIL) 2.5mg/3ml NEBU 2.5 mg by Nebulization route every four hours as needed.     • atorvastatin (LIPITOR) 20 MG Tab Take 1 Tab by mouth every day. 90 Tab 3   • [DISCONTINUED] metolazone (ZAROXOLYN) 2.5 MG Tab Take 1 Tab by mouth every day. 15 Tab 3   • [DISCONTINUED] carvedilol (COREG) 3.125 MG Tab Take 1 Tab by mouth 2 times a day, with meals. 180 Tab 3   • [DISCONTINUED] potassium chloride SA (K-DUR) 10 MEQ Tab CR Take 1 Tab by mouth every day. TAKE 1 TAB BY  "MOUTH EVERY DAY. 90 Tab 3   • [DISCONTINUED] folic acid (FOLVITE) 400 MCG tablet Take 800 mcg by mouth.  3   • [DISCONTINUED] furosemide (LASIX) 40 MG TABS Take 40 mg by mouth every day.     • [DISCONTINUED] lisinopril (PRINIVIL) 5 MG TABS Take 1 Tab by mouth every day. 90 Tab 3     No facility-administered encounter medications on file as of 11/2/2017.      Review of Systems   Constitutional: Negative for chills, fever and malaise/fatigue.   Respiratory: Positive for sputum production (small amount of white sputum usually first thing in the morning.) and shortness of breath (exertion.). Negative for cough.    Cardiovascular: Negative for chest pain, palpitations, orthopnea, claudication, leg swelling and PND.   Gastrointestinal: Negative for abdominal pain.   Musculoskeletal: Negative for myalgias.   Neurological: Negative for dizziness and weakness.        Objective:   /66   Pulse 80   Ht 1.829 m (6' 0.01\")   Wt 99.8 kg (220 lb)   SpO2 91%   BMI 29.83 kg/m²     Physical Exam   Constitutional: He is oriented to person, place, and time. He appears well-developed and well-nourished.   HENT:   Head: Normocephalic.   Eyes: Conjunctivae are normal.   Neck: No JVD present. No thyromegaly present.   Cardiovascular: Normal rate, regular rhythm and normal heart sounds.    Pulmonary/Chest: Effort normal. He has no wheezes. He has rhonchi (scattered rhonchi particularly in the left base.). He has no rales.   Abdominal: Soft. Bowel sounds are normal. He exhibits no distension. There is no tenderness.   Musculoskeletal: He exhibits edema (trace to 1+ right ankle.).   Left foot wrapped with dressings in an orthopedic shoe.   Neurological: He is alert and oriented to person, place, and time.   Skin: Skin is warm and dry.   Psychiatric: He has a normal mood and affect.     Results for TATYANA AMIN (MRN [id number]) as of 11/2/2017 15:04   Ref. Range 9/19/2017 14:05   Sodium Latest Ref Range: 134 - 144 mmol/L 143 "   Potassium Latest Ref Range: 3.5 - 5.2 mmol/L 4.2   Chloride Latest Ref Range: 96 - 106 mmol/L 101   Co2 Latest Ref Range: 18 - 29 mmol/L 25   Glucose Latest Ref Range: 65 - 99 mg/dL 94   Bun Latest Ref Range: 8 - 27 mg/dL 25   Creatinine Latest Ref Range: 0.76 - 1.27 mg/dL 1.17   GFR If  Latest Ref Range: >59 mL/min/1.73 70   GFR If Non  Latest Ref Range: >59 mL/min/1.73 61   Bun-Creatinine Ratio Latest Ref Range: 10 - 24  21   Calcium Latest Ref Range: 8.6 - 10.2 mg/dL 9.2   AST(SGOT) Latest Ref Range: 0 - 40 IU/L 13   ALT(SGPT) Latest Ref Range: 0 - 44 IU/L 9   Alkaline Phosphatase Latest Ref Range: 39 - 117 IU/L 90   Total Bilirubin Latest Ref Range: 0.0 - 1.2 mg/dL <0.2   Albumin Latest Ref Range: 3.5 - 4.8 g/dL 3.5   Total Protein Latest Ref Range: 6.0 - 8.5 g/dL 6.1   Globulin Latest Ref Range: 1.5 - 4.5 g/dL 2.6   A-G Ratio Latest Ref Range: 1.2 - 2.2  1.3   Cholesterol,Tot Latest Ref Range: 100 - 199 mg/dL 147   Triglycerides Latest Ref Range: 0 - 149 mg/dL 141   HDL Latest Ref Range: >39 mg/dL 43   LDL Latest Ref Range: 0 - 99 mg/dL 76   VLDL Cholesterol Calc Latest Ref Range: 5 - 40 mg/dL 28 December 13, 2013: Echocardiogram:   Normal left ventricular systolic function.  Left ventricular ejection fraction is 65% to 70%.  Mild concentric left ventricular hypertrophy.  Grade I diastolic dysfunction is present.  Aortic sclerosis without stenosis.  Mitral annular calcification.  Mildly Calcified/thickened mitral valve leaflets.    Today: EKG is sinus rhythm without ectopy. No ST elevations.     Today: Chest x-ray shows hyperexpansion of the lungs. No consolidation or edema. No effusion or pneumothorax. Mild COPD.    Assessment:     1. Shortness of breath  ECHOCARDIOGRAM COMP W/O CONT   2. Chronic obstructive pulmonary disease, unspecified COPD type (CMS-[facility])     3. PAF (paroxysmal atrial fibrillation) (CMS-HCC)  [facility] EKG (Clinic Performed)   4. Coronary artery  disease involving native coronary artery of native heart without angina pectoris      inoperable disease   5. Localized edema  potassium chloride SA (K-DUR) 10 MEQ Tab CR   6. Essential hypertension, benign  carvedilol (COREG) 3.125 MG Tab    lisinopril (PRINIVIL) 5 MG Tab   7. Mixed hyperlipidemia  LIPID PROFILE    COMP METABOLIC PANEL   8. Diabetes mellitus type 2, noninsulin dependent (CMS-[facility])  HEMOGLOBIN A1C (Glycohemoglobin GHB Total/A1C with MBG Estimate)       Medical Decision Making:  Today's Assessment / Status / Plan:     Shortness of breath: Probably related to COPD as he has a long history of smoking and carries a diagnosis of COPD. He has some rhonchi in the left base but no pneumonia on chest x-ray.    Possibly he has had a reduction in his ejection fraction since his last echocardiogram in 2013. I do not see any significant heart failure or fluid overload signs but we will do an echocardiogram to evaluate his ejection fraction.    Paroxysmal atrial fibrillation: Patient is in a regular rhythm today.    Coronary artery disease: Patient has no anginal symptoms. EKG shows no ischemia. His coronary artery disease has been deemed inoperable S he has no targets for revascularization.    Edema: He does not have significant edema. I am unable to evaluate his left lower extremity as he has a dressing in place.    Hyperlipidemia: He will be due for lipids metabolic panel at his next appointment.    Diabetes: Since we are checking labs as a courtesy I will order hemoglobin A1c. Diabetes to be managed by other providers.    Patient would like to continue undergoing hyperbaric treatments for his lower extremity. Per a note from the technician at the hyperbaric center he will need a written release. The patient may return to his hyperbaric treatments.    Seems like his shortness of breath is lung disease. He is not currently treating his COPD with medications or inhalers. I've encouraged him to follow-up with his  primary care in order to get his COPD adequately treated.    The patient will follow-up as scheduled with Dr. Stanley on January 3, 2018. He will follow-up sooner if problems.    Collaborating Provider: Dr. Kait Olivier.

## 2017-11-03 LAB — EKG IMPRESSION: NORMAL

## 2017-12-11 ENCOUNTER — HOSPITAL ENCOUNTER (OUTPATIENT)
Dept: CARDIOLOGY | Facility: MEDICAL CENTER | Age: 77
End: 2017-12-11
Attending: NURSE PRACTITIONER
Payer: MEDICARE

## 2017-12-11 DIAGNOSIS — R06.02 SHORTNESS OF BREATH: ICD-10-CM

## 2017-12-11 LAB
LV EJECT FRACT  99904: 60
LV EJECT FRACT MOD 2C 99903: 65.37
LV EJECT FRACT MOD 4C 99902: 51.42
LV EJECT FRACT MOD BP 99901: 59.73

## 2017-12-11 PROCEDURE — 93306 TTE W/DOPPLER COMPLETE: CPT | Mod: 26 | Performed by: INTERNAL MEDICINE

## 2017-12-11 PROCEDURE — 93306 TTE W/DOPPLER COMPLETE: CPT

## 2017-12-27 LAB
ALBUMIN SERPL-MCNC: 3.8 G/DL (ref 3.5–4.8)
ALBUMIN/GLOB SERPL: 1.4 {RATIO} (ref 1.2–2.2)
ALP SERPL-CCNC: 109 IU/L (ref 39–117)
ALT SERPL-CCNC: 11 IU/L (ref 0–44)
AST SERPL-CCNC: 11 IU/L (ref 0–40)
BILIRUB SERPL-MCNC: 0.3 MG/DL (ref 0–1.2)
BUN SERPL-MCNC: 40 MG/DL (ref 8–27)
BUN/CREAT SERPL: 24 (ref 10–24)
CALCIUM SERPL-MCNC: 9.5 MG/DL (ref 8.6–10.2)
CHLORIDE SERPL-SCNC: 102 MMOL/L (ref 96–106)
CHOLEST SERPL-MCNC: 196 MG/DL (ref 100–199)
CO2 SERPL-SCNC: 25 MMOL/L (ref 18–29)
COMMENT 011824: ABNORMAL
CREAT SERPL-MCNC: 1.65 MG/DL (ref 0.76–1.27)
GLOBULIN SER CALC-MCNC: 2.8 G/DL (ref 1.5–4.5)
GLUCOSE SERPL-MCNC: 128 MG/DL (ref 65–99)
HBA1C MFR BLD: 7.1 % (ref 4.8–5.6)
HDLC SERPL-MCNC: 48 MG/DL
IF AFRICAN AMERICAN  100797: 46 ML/MIN/1.73
IF NON AFRICAN AMER 100791: 40 ML/MIN/1.73
LDLC SERPL CALC-MCNC: 130 MG/DL (ref 0–99)
POTASSIUM SERPL-SCNC: 4.1 MMOL/L (ref 3.5–5.2)
PROT SERPL-MCNC: 6.6 G/DL (ref 6–8.5)
SODIUM SERPL-SCNC: 142 MMOL/L (ref 134–144)
TRIGL SERPL-MCNC: 88 MG/DL (ref 0–149)
VLDLC SERPL CALC-MCNC: 18 MG/DL (ref 5–40)

## 2018-01-03 ENCOUNTER — OFFICE VISIT (OUTPATIENT)
Dept: CARDIOLOGY | Facility: MEDICAL CENTER | Age: 78
End: 2018-01-03
Payer: MEDICARE

## 2018-01-03 VITALS
OXYGEN SATURATION: 98 % | DIASTOLIC BLOOD PRESSURE: 62 MMHG | WEIGHT: 215 LBS | BODY MASS INDEX: 29.12 KG/M2 | SYSTOLIC BLOOD PRESSURE: 100 MMHG | HEIGHT: 72 IN | HEART RATE: 64 BPM

## 2018-01-03 DIAGNOSIS — E78.2 MIXED HYPERLIPIDEMIA: ICD-10-CM

## 2018-01-03 DIAGNOSIS — N18.30 CKD (CHRONIC KIDNEY DISEASE), STAGE III (HCC): ICD-10-CM

## 2018-01-03 DIAGNOSIS — J41.0 SIMPLE CHRONIC BRONCHITIS (HCC): ICD-10-CM

## 2018-01-03 DIAGNOSIS — I10 ESSENTIAL HYPERTENSION, BENIGN: ICD-10-CM

## 2018-01-03 PROCEDURE — 99213 OFFICE O/P EST LOW 20 MIN: CPT | Performed by: INTERNAL MEDICINE

## 2018-01-03 ASSESSMENT — ENCOUNTER SYMPTOMS
BLOOD IN STOOL: 0
HEARTBURN: 0
PALPITATIONS: 0
MYALGIAS: 0
DEPRESSION: 0
NECK PAIN: 0
HEADACHES: 0
DIZZINESS: 0
FEVER: 0
BRUISES/BLEEDS EASILY: 0
SHORTNESS OF BREATH: 1
BLURRED VISION: 0

## 2018-01-03 NOTE — PROGRESS NOTES
Subjective:   David Figueroa is a 77 y.o. male who presents today for follow-up of multivessel coronary disease by angiography in March 2015. At that time, he was found to have inoperable CAD do due to lack of targets.  He's had no angina. EF was preserved by most recent echo. The patient was recently has had difficulty with dyspnea which was felt to be secondary to COPD. He's had no angina at all. His edema is a bit better with the addition of metolazone. His GFR was recently checked and was 40. He still has afternoon edema. The patient is does not elevate his legs. He has completed hyperbaric therapy for his foot ulcer  Past Medical History:   Diagnosis Date   • Asthma    • Breath shortness     O2  3 l/m at night   • Cold 1/2015   • Dental disorder     upper lower   • Diabetes     oral meds   • Emphysema     O2 3 liters prn   • Hiatus hernia syndrome    • High cholesterol    • Hyperlipidemia    • Hypertension    • Other emphysema (CMS-[facility])    • Paroxysmal atrial fibrillation (CMS-[facility])    • Pneumonia 2013   • Snoring    • Stroke (CMS-[facility]) 1999   • Weakness     since pneumonia     Past Surgical History:   Procedure Laterality Date   • RECOVERY  3/4/2015    Performed by Cath-Recovery Surgery at SURGERY SAME DAY [facility] ORS   • CAROTID ENDARTERECTOMY  9/17/2014    Performed by Willy Solares M.D. at SURGERY [facility] ORS   • CARDIAC CATH      inoperable disease.     History reviewed. No pertinent family history.  History   Smoking Status   • Former Smoker   • Packs/day: 2.00   • Years: 55.00   • Types: Cigarettes   • Quit date: 7/1/2013   Smokeless Tobacco   • Never Used     Allergies   Allergen Reactions   • Other Misc Rash     Cat Hair     Outpatient Encounter Prescriptions as of 1/3/2018   Medication Sig Dispense Refill   • furosemide (LASIX) 40 MG Tab Take 1 Tab by mouth every day. 90 Tab 3   • lisinopril (PRINIVIL) 5 MG Tab Take 1 Tab by mouth every day. 90 Tab 3   • metolazone (ZAROXOLYN) 2.5 MG Tab  Take 1 Tab by mouth 2X A WEEK. 30 Tab 11   • atorvastatin (LIPITOR) 20 MG Tab Take 1 Tab by mouth every day. 90 Tab 3   • metformin ER (GLUCOPHAGE XR) 750 MG TABLET SR 24 HR Take 750 mg by mouth every day.     • aspirin 81 MG tablet Take 81 mg by mouth every day.     • famotidine (PEPCID) 20 MG TABS Take 20 mg by mouth every day.     • carvedilol (COREG) 3.125 MG Tab Take 1 Tab by mouth 2 times a day, with meals. (Patient not taking: Reported on 1/3/2018) 180 Tab 3   • potassium chloride SA (K-DUR) 10 MEQ Tab CR Take 1 Tab by mouth every day. Take additional tablet when taking metolazone. 100 Tab 3   • glipiZIDE (GLUCOTROL) 5 MG TABS Take 5 mg by mouth every morning.     • Cholecalciferol (VITAMIN D-3) 1000 UNITS CAPS Take 1 Cap by mouth every day.     • metformin (GLUCOPHAGE) 500 MG TABS Take 500 mg by mouth 2 times a day.     • Arformoterol Tartrate (BROVANA) 15 MCG/2ML NEBU Inhale  by mouth. Indications: Emphysema     • budesonide (PULMICORT) 0.25 MG/2ML SUSP 250 mcg by Nebulization route 2 times a day. Indications: Chronic Obstructive Lung Disease     • albuterol (PROVENTIL) 2.5mg/3ml NEBU 2.5 mg by Nebulization route every four hours as needed.       No facility-administered encounter medications on file as of 1/3/2018.      Review of Systems   Constitutional: Positive for malaise/fatigue. Negative for fever.   HENT: Positive for hearing loss.    Eyes: Negative for blurred vision.   Respiratory: Positive for shortness of breath.    Cardiovascular: Positive for leg swelling. Negative for chest pain and palpitations.   Gastrointestinal: Negative for blood in stool and heartburn.   Genitourinary: Negative for hematuria.   Musculoskeletal: Negative for myalgias and neck pain.   Skin: Negative for rash.   Neurological: Negative for dizziness and headaches.   Endo/Heme/Allergies: Does not bruise/bleed easily.   Psychiatric/Behavioral: Negative for depression.        Objective:   /62   Pulse 64   Ht 1.829 m  "(6' 0.01\")   Wt 97.5 kg (215 lb)   SpO2 98%   BMI 29.15 kg/m²     Physical Exam   Constitutional: He is oriented to person, place, and time. No distress.   HENT:   Head: Normocephalic and atraumatic.   Eyes: Conjunctivae are normal. No scleral icterus.   Neck: No JVD present.   Cardiovascular: Normal rate and regular rhythm.    Pulmonary/Chest: He has no wheezes. He has no rales.   Decreased breath sounds   Abdominal: Soft. He exhibits no mass. There is no tenderness.   Large umbilical hernia  Obese   Musculoskeletal: He exhibits edema.   1+ edema right. Left leg is wrapped.   Neurological: He is oriented to person, place, and time.   Skin: Skin is warm and dry.   1 cm lesion just to the right of midline in the mid back suspicious of squamous cell cancer.   Psychiatric: He has a normal mood and affect.       Assessment:     1. Mixed hyperlipidemia  BASIC METABOLIC PANEL   2. Essential hypertension, benign  BASIC METABOLIC PANEL   3. Simple chronic bronchitis (CMS-HCC)  BASIC METABOLIC PANEL   4. CKD (chronic kidney disease), stage III  BASIC METABOLIC PANEL       Medical Decision Making:  Today's Assessment / Status / Plan:   The patient has inoperable coronary artery disease but has had no angina. His edema is better on the right. Left leg remains wrapped. Recommend rechecking kidney function in about 6 weeks to ensure he does not have any deep further deterioration. He doesn't check his sugars to reliably. His blood pressure under good control. Continue current medications.  "

## 2018-04-19 LAB
BUN SERPL-MCNC: 37 MG/DL (ref 8–27)
BUN/CREAT SERPL: 26 (ref 10–24)
CALCIUM SERPL-MCNC: 9.7 MG/DL (ref 8.6–10.2)
CHLORIDE SERPL-SCNC: 100 MMOL/L (ref 96–106)
CO2 SERPL-SCNC: 24 MMOL/L (ref 18–29)
CREAT SERPL-MCNC: 1.4 MG/DL (ref 0.76–1.27)
GFR SERPLBLD CREATININE-BSD FMLA CKD-EPI: 49 ML/MIN/1.73
GFR SERPLBLD CREATININE-BSD FMLA CKD-EPI: 56 ML/MIN/1.73
GLUCOSE SERPL-MCNC: 121 MG/DL (ref 65–99)
POTASSIUM SERPL-SCNC: 4.2 MMOL/L (ref 3.5–5.2)
SODIUM SERPL-SCNC: 142 MMOL/L (ref 134–144)

## 2018-04-20 ENCOUNTER — TELEPHONE (OUTPATIENT)
Dept: CARDIOLOGY | Facility: MEDICAL CENTER | Age: 78
End: 2018-04-20

## 2018-04-20 NOTE — TELEPHONE ENCOUNTER
----- Message from Nikki Dillon R.N. sent at 4/20/2018 12:47 PM PDT -----      ----- Message -----  From: Khang Stanley M.D.  Sent: 4/20/2018  12:46 PM  To: Park Wood L.P.N.    Kidney function has improved moderately. Continue same medicaitons  ----- Message -----  From: Park Wood L.P.N.  Sent: 4/19/2018   8:55 AM  To: Khang Stanley M.D.    FV 7-5-18 with RS.

## 2018-12-07 DIAGNOSIS — E78.49 OTHER HYPERLIPIDEMIA: Primary | ICD-10-CM

## 2018-12-07 RX ORDER — FUROSEMIDE 40 MG/1
40 TABLET ORAL DAILY
Qty: 90 TAB | Refills: 3 | Status: ON HOLD | OUTPATIENT
Start: 2018-12-07 | End: 2019-07-08

## 2019-01-03 ENCOUNTER — OFFICE VISIT (OUTPATIENT)
Dept: CARDIOLOGY | Facility: MEDICAL CENTER | Age: 79
End: 2019-01-03
Payer: MEDICARE

## 2019-01-03 VITALS
HEIGHT: 72 IN | SYSTOLIC BLOOD PRESSURE: 118 MMHG | BODY MASS INDEX: 30.88 KG/M2 | WEIGHT: 228 LBS | DIASTOLIC BLOOD PRESSURE: 70 MMHG | OXYGEN SATURATION: 94 % | HEART RATE: 106 BPM

## 2019-01-03 DIAGNOSIS — R60.0 LOCALIZED EDEMA: ICD-10-CM

## 2019-01-03 DIAGNOSIS — I25.10 CORONARY ARTERY DISEASE INVOLVING NATIVE CORONARY ARTERY OF NATIVE HEART WITHOUT ANGINA PECTORIS: ICD-10-CM

## 2019-01-03 DIAGNOSIS — E11.9 DIABETES MELLITUS TYPE 2, NONINSULIN DEPENDENT (HCC): ICD-10-CM

## 2019-01-03 DIAGNOSIS — E78.2 MIXED HYPERLIPIDEMIA: ICD-10-CM

## 2019-01-03 DIAGNOSIS — I10 ESSENTIAL HYPERTENSION, BENIGN: ICD-10-CM

## 2019-01-03 PROCEDURE — 99214 OFFICE O/P EST MOD 30 MIN: CPT | Performed by: INTERNAL MEDICINE

## 2019-01-03 ASSESSMENT — ENCOUNTER SYMPTOMS
FEVER: 0
HEADACHES: 0
MYALGIAS: 0
NECK PAIN: 0
DEPRESSION: 0
BLURRED VISION: 0
WEIGHT LOSS: 0
BRUISES/BLEEDS EASILY: 0
DIZZINESS: 0
SHORTNESS OF BREATH: 1
PALPITATIONS: 0
HEARTBURN: 0
BLOOD IN STOOL: 0

## 2019-01-04 NOTE — PROGRESS NOTES
Chief Complaint   Patient presents with   • Coronary Artery Disease       Subjective:   David Figueroa is a 78 y.o. male who presents today for follow-up of coronary artery disease.  By angiography in 2015 he was found to have multivessel disease with no flow in his distal LAD.  He is felt to be a poor candidate for either percutaneous or surgical intervention.  He has done well since that time despite his advanced multivessel disease.  He has had no angina.    He has a difficult time with edema and has his legs wrapped.  .  He has foot wounds that is followed in the wound clinic.  The patient does not check his sugars too frequently.  He has not smoked for at least 6 years.    Echo a little over a year ago revealed preserved LV systolic function, aortic valve sclerosis, no mitral insufficiency, and no Doppler evidence of pulmonary hypertension.    Past Medical History:   Diagnosis Date   • Asthma    • Breath shortness     O2  3 l/m at night   • Cold 1/2015   • Dental disorder     upper lower   • Diabetes     oral meds   • Emphysema     O2 3 liters prn   • Hiatus hernia syndrome    • High cholesterol    • Hyperlipidemia    • Hypertension    • Other emphysema (HCC)    • Paroxysmal atrial fibrillation (HCC)    • Pneumonia 2013   • Snoring    • Stroke (HCC) 1999   • Weakness     since pneumonia     Past Surgical History:   Procedure Laterality Date   • RECOVERY  3/4/2015    Performed by Cath-Recovery Surgery at SURGERY SAME DAY [facility] ORS   • CAROTID ENDARTERECTOMY  9/17/2014    Performed by Willy Solares M.D. at SURGERY [facility] ORS   • CARDIAC CATH      inoperable disease.     No family history on file.  Social History     Social History   • Marital status:      Spouse name: N/A   • Number of children: N/A   • Years of education: N/A     Occupational History   • Not on file.     Social History Main Topics   • Smoking status: Former Smoker     Packs/day: 2.00     Years: 55.00     Types: Cigarettes      Quit date: 7/1/2013   • Smokeless tobacco: Never Used   • Alcohol use No   • Drug use: No   • Sexual activity: Not on file     Other Topics Concern   • Not on file     Social History Narrative   • No narrative on file     Allergies   Allergen Reactions   • Other Misc Rash     Cat Hair     Outpatient Encounter Prescriptions as of 1/3/2019   Medication Sig Dispense Refill   • furosemide (LASIX) 40 MG Tab Take 1 Tab by mouth every day. 90 Tab 3   • potassium chloride SA (K-DUR) 10 MEQ Tab CR Take 1 Tab by mouth every day. Take additional tablet when taking metolazone. 100 Tab 3   • lisinopril (PRINIVIL) 5 MG Tab Take 1 Tab by mouth every day. 90 Tab 3   • atorvastatin (LIPITOR) 20 MG Tab Take 1 Tab by mouth every day. 90 Tab 3   • metformin ER (GLUCOPHAGE XR) 750 MG TABLET SR 24 HR Take 750 mg by mouth every day.     • aspirin 81 MG tablet Take 81 mg by mouth every day.     • famotidine (PEPCID) 20 MG TABS Take 20 mg by mouth every day.     • carvedilol (COREG) 3.125 MG Tab Take 1 Tab by mouth 2 times a day, with meals. (Patient not taking: Reported on 1/3/2018) 180 Tab 3   • metolazone (ZAROXOLYN) 2.5 MG Tab Take 1 Tab by mouth 2X A WEEK. (Patient not taking: Reported on 1/3/2019) 30 Tab 11   • glipiZIDE (GLUCOTROL) 5 MG TABS Take 5 mg by mouth every morning.     • Cholecalciferol (VITAMIN D-3) 1000 UNITS CAPS Take 1 Cap by mouth every day.     • metformin (GLUCOPHAGE) 500 MG TABS Take 500 mg by mouth 2 times a day.     • Arformoterol Tartrate (BROVANA) 15 MCG/2ML NEBU Inhale  by mouth. Indications: Emphysema     • budesonide (PULMICORT) 0.25 MG/2ML SUSP 250 mcg by Nebulization route 2 times a day. Indications: Chronic Obstructive Lung Disease     • albuterol (PROVENTIL) 2.5mg/3ml NEBU 2.5 mg by Nebulization route every four hours as needed.       No facility-administered encounter medications on file as of 1/3/2019.      Review of Systems   Constitutional: Positive for malaise/fatigue. Negative for fever and  weight loss.   HENT: Positive for hearing loss.    Eyes: Negative for blurred vision.   Respiratory: Positive for shortness of breath.    Cardiovascular: Positive for leg swelling. Negative for chest pain and palpitations.   Gastrointestinal: Negative for blood in stool and heartburn.   Genitourinary: Negative for hematuria.   Musculoskeletal: Negative for myalgias and neck pain.   Skin: Negative for rash.   Neurological: Negative for dizziness and headaches.   Endo/Heme/Allergies: Does not bruise/bleed easily.   Psychiatric/Behavioral: Negative for depression.        Objective:   /70 (BP Location: Left arm, Patient Position: Sitting)   Pulse (!) 106   Ht 1.829 m (6')   Wt 103.4 kg (228 lb)   SpO2 94%   BMI 30.92 kg/m²     Physical Exam   Constitutional: No distress.   HENT:   Head: Normocephalic and atraumatic.   Eyes: No scleral icterus.   Neck: No JVD present.   Cardiovascular: Regular rhythm.    Murmur heard.   Systolic murmur is present with a grade of 1/6   Apical murmur   Pulmonary/Chest: Effort normal. No respiratory distress. He has no wheezes.   Musculoskeletal: He exhibits edema.   Both legs are wrapped to the knee.  There is slight edema just above the edge of the wrap near the knee.   Skin: Skin is warm and dry.       Assessment:     1. Coronary artery disease involving native coronary artery of native heart without angina pectoris  Lipid Profile    COMP METABOLIC PANEL    CBC WITH DIFFERENTIAL   2. Localized edema  Lipid Profile    COMP METABOLIC PANEL    CBC WITH DIFFERENTIAL   3. Essential hypertension, benign     4. Mixed hyperlipidemia     5. Diabetes mellitus type 2, noninsulin dependent (HCC)  HEMOGLOBIN A1C (Glycohemoglobin GHB Total/A1C with MBG Estimate)       Medical Decision Making:  Today's Assessment / Status / Plan:   Coronary artery disease: Patient has complex coronary disease.  He is not a candidate for intervention.  He has had no angina.    Hyperlipidemia: Treated with  statin.  He is due for lab work.    Hypertension: Under good control on current medications.    Diabetes mellitus: He does not check his sugars to faithfully.  A glycohemoglobin will be checked.    Edema: Does not appear to be secondary to left heart failure.  This may be secondary to venous insufficiency and COPD.  We will check renal function and consider starting him back on low-dose metolazone once or twice a week depending on his kidney function.  Otherwise return in 1 year.

## 2019-01-11 DIAGNOSIS — R60.0 LOCALIZED EDEMA: ICD-10-CM

## 2019-01-14 RX ORDER — POTASSIUM CHLORIDE 750 MG/1
10 TABLET, EXTENDED RELEASE ORAL
Qty: 100 TAB | Refills: 3 | Status: SHIPPED | OUTPATIENT
Start: 2019-01-14 | End: 2019-06-25

## 2019-06-25 ENCOUNTER — APPOINTMENT (OUTPATIENT)
Dept: RADIOLOGY | Facility: MEDICAL CENTER | Age: 79
DRG: 622 | End: 2019-06-25
Attending: EMERGENCY MEDICINE
Payer: MEDICARE

## 2019-06-25 ENCOUNTER — HOSPITAL ENCOUNTER (INPATIENT)
Facility: MEDICAL CENTER | Age: 79
LOS: 14 days | DRG: 622 | End: 2019-07-09
Attending: EMERGENCY MEDICINE | Admitting: INTERNAL MEDICINE
Payer: MEDICARE

## 2019-06-25 DIAGNOSIS — L97.514 DIABETIC ULCER OF OTHER PART OF RIGHT FOOT ASSOCIATED WITH TYPE 2 DIABETES MELLITUS, WITH NECROSIS OF BONE (HCC): ICD-10-CM

## 2019-06-25 DIAGNOSIS — E11.621 DIABETIC ULCER OF OTHER PART OF LEFT FOOT ASSOCIATED WITH TYPE 2 DIABETES MELLITUS, WITH BONE INVOLVEMENT WITHOUT EVIDENCE OF NECROSIS (HCC): ICD-10-CM

## 2019-06-25 DIAGNOSIS — J81.0 ACUTE PULMONARY EDEMA (HCC): ICD-10-CM

## 2019-06-25 DIAGNOSIS — L97.526 DIABETIC ULCER OF OTHER PART OF LEFT FOOT ASSOCIATED WITH TYPE 2 DIABETES MELLITUS, WITH BONE INVOLVEMENT WITHOUT EVIDENCE OF NECROSIS (HCC): ICD-10-CM

## 2019-06-25 DIAGNOSIS — E11.621 DIABETIC ULCER OF OTHER PART OF RIGHT FOOT ASSOCIATED WITH TYPE 2 DIABETES MELLITUS, WITH NECROSIS OF BONE (HCC): ICD-10-CM

## 2019-06-25 DIAGNOSIS — E11.628 DIABETIC FOOT INFECTION (HCC): ICD-10-CM

## 2019-06-25 DIAGNOSIS — I42.9 CARDIOMYOPATHY, UNSPECIFIED TYPE (HCC): ICD-10-CM

## 2019-06-25 DIAGNOSIS — I25.5 ISCHEMIC CARDIOMYOPATHY: ICD-10-CM

## 2019-06-25 DIAGNOSIS — L08.9 DIABETIC FOOT INFECTION (HCC): ICD-10-CM

## 2019-06-25 DIAGNOSIS — M86.471 CHRONIC OSTEOMYELITIS OF RIGHT FOOT WITH DRAINING SINUS (HCC): ICD-10-CM

## 2019-06-25 LAB
ALBUMIN SERPL BCP-MCNC: 3.2 G/DL (ref 3.2–4.9)
ALBUMIN/GLOB SERPL: 1 G/DL
ALP SERPL-CCNC: 94 U/L (ref 30–99)
ALT SERPL-CCNC: 20 U/L (ref 2–50)
ANION GAP SERPL CALC-SCNC: 7 MMOL/L (ref 0–11.9)
AST SERPL-CCNC: 14 U/L (ref 12–45)
BASOPHILS # BLD AUTO: 0.4 % (ref 0–1.8)
BASOPHILS # BLD: 0.03 K/UL (ref 0–0.12)
BILIRUB SERPL-MCNC: 0.6 MG/DL (ref 0.1–1.5)
BNP SERPL-MCNC: 980 PG/ML (ref 0–100)
BUN SERPL-MCNC: 29 MG/DL (ref 8–22)
CALCIUM SERPL-MCNC: 8.8 MG/DL (ref 8.5–10.5)
CHLORIDE SERPL-SCNC: 107 MMOL/L (ref 96–112)
CO2 SERPL-SCNC: 27 MMOL/L (ref 20–33)
CREAT SERPL-MCNC: 1.37 MG/DL (ref 0.5–1.4)
EKG IMPRESSION: NORMAL
EOSINOPHIL # BLD AUTO: 0.11 K/UL (ref 0–0.51)
EOSINOPHIL NFR BLD: 1.4 % (ref 0–6.9)
ERYTHROCYTE [DISTWIDTH] IN BLOOD BY AUTOMATED COUNT: 52.8 FL (ref 35.9–50)
GLOBULIN SER CALC-MCNC: 3.1 G/DL (ref 1.9–3.5)
GLUCOSE BLD-MCNC: 203 MG/DL (ref 65–99)
GLUCOSE BLD-MCNC: 271 MG/DL (ref 65–99)
GLUCOSE SERPL-MCNC: 352 MG/DL (ref 65–99)
HCT VFR BLD AUTO: 35.5 % (ref 42–52)
HGB BLD-MCNC: 10.9 G/DL (ref 14–18)
IMM GRANULOCYTES # BLD AUTO: 0.02 K/UL (ref 0–0.11)
IMM GRANULOCYTES NFR BLD AUTO: 0.3 % (ref 0–0.9)
LACTATE BLD-SCNC: 1.9 MMOL/L (ref 0.5–2)
LYMPHOCYTES # BLD AUTO: 1.55 K/UL (ref 1–4.8)
LYMPHOCYTES NFR BLD: 20 % (ref 22–41)
MAGNESIUM SERPL-MCNC: 2.2 MG/DL (ref 1.5–2.5)
MCH RBC QN AUTO: 30.8 PG (ref 27–33)
MCHC RBC AUTO-ENTMCNC: 30.7 G/DL (ref 33.7–35.3)
MCV RBC AUTO: 100.3 FL (ref 81.4–97.8)
MONOCYTES # BLD AUTO: 0.89 K/UL (ref 0–0.85)
MONOCYTES NFR BLD AUTO: 11.5 % (ref 0–13.4)
NEUTROPHILS # BLD AUTO: 5.14 K/UL (ref 1.82–7.42)
NEUTROPHILS NFR BLD: 66.4 % (ref 44–72)
NRBC # BLD AUTO: 0 K/UL
NRBC BLD-RTO: 0 /100 WBC
PLATELET # BLD AUTO: 191 K/UL (ref 164–446)
PMV BLD AUTO: 11 FL (ref 9–12.9)
POTASSIUM SERPL-SCNC: 4.9 MMOL/L (ref 3.6–5.5)
PROT SERPL-MCNC: 6.3 G/DL (ref 6–8.2)
RBC # BLD AUTO: 3.54 M/UL (ref 4.7–6.1)
SODIUM SERPL-SCNC: 141 MMOL/L (ref 135–145)
TROPONIN I SERPL-MCNC: 0.1 NG/ML (ref 0–0.04)
WBC # BLD AUTO: 7.7 K/UL (ref 4.8–10.8)

## 2019-06-25 PROCEDURE — 83735 ASSAY OF MAGNESIUM: CPT

## 2019-06-25 PROCEDURE — 700111 HCHG RX REV CODE 636 W/ 250 OVERRIDE (IP): Performed by: INTERNAL MEDICINE

## 2019-06-25 PROCEDURE — 85025 COMPLETE CBC W/AUTO DIFF WBC: CPT

## 2019-06-25 PROCEDURE — 82962 GLUCOSE BLOOD TEST: CPT

## 2019-06-25 PROCEDURE — 83880 ASSAY OF NATRIURETIC PEPTIDE: CPT

## 2019-06-25 PROCEDURE — 80053 COMPREHEN METABOLIC PANEL: CPT

## 2019-06-25 PROCEDURE — 96366 THER/PROPH/DIAG IV INF ADDON: CPT

## 2019-06-25 PROCEDURE — 73630 X-RAY EXAM OF FOOT: CPT | Mod: LT

## 2019-06-25 PROCEDURE — 99223 1ST HOSP IP/OBS HIGH 75: CPT | Performed by: INTERNAL MEDICINE

## 2019-06-25 PROCEDURE — 700102 HCHG RX REV CODE 250 W/ 637 OVERRIDE(OP): Performed by: INTERNAL MEDICINE

## 2019-06-25 PROCEDURE — 99285 EMERGENCY DEPT VISIT HI MDM: CPT

## 2019-06-25 PROCEDURE — 73630 X-RAY EXAM OF FOOT: CPT | Mod: RT

## 2019-06-25 PROCEDURE — 87040 BLOOD CULTURE FOR BACTERIA: CPT

## 2019-06-25 PROCEDURE — 36415 COLL VENOUS BLD VENIPUNCTURE: CPT

## 2019-06-25 PROCEDURE — 96368 THER/DIAG CONCURRENT INF: CPT

## 2019-06-25 PROCEDURE — 83605 ASSAY OF LACTIC ACID: CPT

## 2019-06-25 PROCEDURE — 96375 TX/PRO/DX INJ NEW DRUG ADDON: CPT

## 2019-06-25 PROCEDURE — 73030 X-RAY EXAM OF SHOULDER: CPT | Mod: RT

## 2019-06-25 PROCEDURE — 700105 HCHG RX REV CODE 258: Performed by: EMERGENCY MEDICINE

## 2019-06-25 PROCEDURE — 93005 ELECTROCARDIOGRAM TRACING: CPT | Performed by: EMERGENCY MEDICINE

## 2019-06-25 PROCEDURE — 71045 X-RAY EXAM CHEST 1 VIEW: CPT

## 2019-06-25 PROCEDURE — 700105 HCHG RX REV CODE 258: Performed by: INTERNAL MEDICINE

## 2019-06-25 PROCEDURE — 96372 THER/PROPH/DIAG INJ SC/IM: CPT

## 2019-06-25 PROCEDURE — 96365 THER/PROPH/DIAG IV INF INIT: CPT

## 2019-06-25 PROCEDURE — 84484 ASSAY OF TROPONIN QUANT: CPT

## 2019-06-25 PROCEDURE — 770006 HCHG ROOM/CARE - MED/SURG/GYN SEMI*

## 2019-06-25 PROCEDURE — 700111 HCHG RX REV CODE 636 W/ 250 OVERRIDE (IP): Performed by: EMERGENCY MEDICINE

## 2019-06-25 PROCEDURE — A9270 NON-COVERED ITEM OR SERVICE: HCPCS | Performed by: INTERNAL MEDICINE

## 2019-06-25 RX ORDER — LISINOPRIL 10 MG/1
5 TABLET ORAL DAILY
Status: DISCONTINUED | OUTPATIENT
Start: 2019-06-25 | End: 2019-06-25

## 2019-06-25 RX ORDER — POLYETHYLENE GLYCOL 3350 17 G/17G
1 POWDER, FOR SOLUTION ORAL
Status: DISCONTINUED | OUTPATIENT
Start: 2019-06-25 | End: 2019-07-09 | Stop reason: HOSPADM

## 2019-06-25 RX ORDER — POTASSIUM CHLORIDE 20 MEQ/1
10 TABLET, EXTENDED RELEASE ORAL DAILY
Status: DISCONTINUED | OUTPATIENT
Start: 2019-06-25 | End: 2019-06-29

## 2019-06-25 RX ORDER — POTASSIUM CHLORIDE 750 MG/1
10 TABLET, EXTENDED RELEASE ORAL DAILY
Status: ON HOLD | COMMUNITY
End: 2019-07-08

## 2019-06-25 RX ORDER — ASPIRIN 81 MG/1
81 TABLET, CHEWABLE ORAL DAILY
Status: DISCONTINUED | OUTPATIENT
Start: 2019-06-25 | End: 2019-07-09 | Stop reason: HOSPADM

## 2019-06-25 RX ORDER — CARVEDILOL 3.12 MG/1
3.12 TABLET ORAL 2 TIMES DAILY WITH MEALS
Status: DISCONTINUED | OUTPATIENT
Start: 2019-06-25 | End: 2019-06-30

## 2019-06-25 RX ORDER — ATORVASTATIN CALCIUM 20 MG/1
20 TABLET, FILM COATED ORAL DAILY
Status: DISCONTINUED | OUTPATIENT
Start: 2019-06-25 | End: 2019-07-09 | Stop reason: HOSPADM

## 2019-06-25 RX ORDER — BISACODYL 10 MG
10 SUPPOSITORY, RECTAL RECTAL
Status: DISCONTINUED | OUTPATIENT
Start: 2019-06-25 | End: 2019-07-09 | Stop reason: HOSPADM

## 2019-06-25 RX ORDER — FAMOTIDINE 20 MG/1
20 TABLET, FILM COATED ORAL DAILY
Status: DISCONTINUED | OUTPATIENT
Start: 2019-06-25 | End: 2019-07-09 | Stop reason: HOSPADM

## 2019-06-25 RX ORDER — CEPHALEXIN 500 MG/1
1000 CAPSULE ORAL 2 TIMES DAILY
Status: ON HOLD | COMMUNITY
End: 2019-07-08

## 2019-06-25 RX ORDER — FUROSEMIDE 10 MG/ML
20 INJECTION INTRAMUSCULAR; INTRAVENOUS
Status: DISCONTINUED | OUTPATIENT
Start: 2019-06-25 | End: 2019-06-29

## 2019-06-25 RX ORDER — ACETAMINOPHEN 325 MG/1
650 TABLET ORAL EVERY 6 HOURS PRN
Status: DISCONTINUED | OUTPATIENT
Start: 2019-06-25 | End: 2019-07-09 | Stop reason: HOSPADM

## 2019-06-25 RX ORDER — HEPARIN SODIUM 5000 [USP'U]/ML
5000 INJECTION, SOLUTION INTRAVENOUS; SUBCUTANEOUS EVERY 8 HOURS
Status: DISCONTINUED | OUTPATIENT
Start: 2019-06-25 | End: 2019-07-09 | Stop reason: HOSPADM

## 2019-06-25 RX ORDER — AMOXICILLIN 250 MG
2 CAPSULE ORAL 2 TIMES DAILY
Status: DISCONTINUED | OUTPATIENT
Start: 2019-06-25 | End: 2019-07-09 | Stop reason: HOSPADM

## 2019-06-25 RX ADMIN — HEPARIN SODIUM 5000 UNITS: 5000 INJECTION, SOLUTION INTRAVENOUS; SUBCUTANEOUS at 18:36

## 2019-06-25 RX ADMIN — PIPERACILLIN AND TAZOBACTAM 3.38 G: 3; .375 INJECTION, POWDER, LYOPHILIZED, FOR SOLUTION INTRAVENOUS; PARENTERAL at 20:55

## 2019-06-25 RX ADMIN — CARVEDILOL 3.12 MG: 3.12 TABLET, FILM COATED ORAL at 18:37

## 2019-06-25 RX ADMIN — POTASSIUM CHLORIDE 20 MEQ: 1500 TABLET, EXTENDED RELEASE ORAL at 18:38

## 2019-06-25 RX ADMIN — ASPIRIN 81 MG 81 MG: 81 TABLET ORAL at 18:37

## 2019-06-25 RX ADMIN — HEPARIN SODIUM 5000 UNITS: 5000 INJECTION, SOLUTION INTRAVENOUS; SUBCUTANEOUS at 22:30

## 2019-06-25 RX ADMIN — PIPERACILLIN AND TAZOBACTAM 3.38 G: 3; .375 INJECTION, POWDER, LYOPHILIZED, FOR SOLUTION INTRAVENOUS; PARENTERAL at 16:24

## 2019-06-25 RX ADMIN — VANCOMYCIN HYDROCHLORIDE 2800 MG: 100 INJECTION, POWDER, LYOPHILIZED, FOR SOLUTION INTRAVENOUS at 15:45

## 2019-06-25 RX ADMIN — FAMOTIDINE 20 MG: 20 TABLET ORAL at 18:37

## 2019-06-25 RX ADMIN — FUROSEMIDE 20 MG: 10 INJECTION, SOLUTION INTRAVENOUS at 18:36

## 2019-06-25 RX ADMIN — INSULIN HUMAN 2 UNITS: 100 INJECTION, SOLUTION PARENTERAL at 23:06

## 2019-06-25 RX ADMIN — INSULIN HUMAN 3 UNITS: 100 INJECTION, SOLUTION PARENTERAL at 17:00

## 2019-06-25 RX ADMIN — ATORVASTATIN CALCIUM 20 MG: 20 TABLET, FILM COATED ORAL at 18:37

## 2019-06-25 ASSESSMENT — ENCOUNTER SYMPTOMS
DOUBLE VISION: 0
PALPITATIONS: 0
NECK PAIN: 0
FEVER: 0
COUGH: 0
ORTHOPNEA: 0
CHILLS: 0
HALLUCINATIONS: 0
VOMITING: 0
SHORTNESS OF BREATH: 0
NAUSEA: 0
TREMORS: 0
WEIGHT LOSS: 0
SPUTUM PRODUCTION: 0
BACK PAIN: 0
DIARRHEA: 0
EYE PAIN: 0
HEADACHES: 0
FALLS: 1
SPEECH CHANGE: 0
CONSTIPATION: 0
PHOTOPHOBIA: 0
BLURRED VISION: 0
DIZZINESS: 0
SENSORY CHANGE: 0
FOCAL WEAKNESS: 1
ABDOMINAL PAIN: 0
MYALGIAS: 0
TINGLING: 0

## 2019-06-25 ASSESSMENT — PATIENT HEALTH QUESTIONNAIRE - PHQ9
1. LITTLE INTEREST OR PLEASURE IN DOING THINGS: NOT AT ALL
SUM OF ALL RESPONSES TO PHQ9 QUESTIONS 1 AND 2: 0
2. FEELING DOWN, DEPRESSED, IRRITABLE, OR HOPELESS: NOT AT ALL

## 2019-06-25 ASSESSMENT — LIFESTYLE VARIABLES: SUBSTANCE_ABUSE: 0

## 2019-06-25 NOTE — H&P
Hospital Medicine History & Physical Note    Date of Service  6/25/2019    Primary Care Physician  Michael Felder M.D.    Consultants  None    Code Status  Full    Chief Complaint  Swelling of bilateral lower extremities.  3 to 4-week    History of Presenting Illness    79 y.o. male with past medical history of diabetes and COPD who presented to the hospital on 6/25/2019 with complaint of bilateral lower extremity edema that has been worsening for the last 3 to 4 weeks along with that increase in erythema in bilateral lower extremities.  He reported that he has been following wound care and his wound improved significantly and they discharged him from the wound clinic but now he developed again swelling and bilateral lower extremity edema.  He also has a wound on the left foot and is draining with purulent discharge.  He reported generalized weakness especially in bilateral lower extremities.  He also had recent fall and denies hitting head.  He denies losing consciousness during these fall.  He denies chest pain, shortness of breath, nausea and vomiting.    I discussed about this admission with ED physician Dr. Villalobos    I reviewed last cardiology note which was on January 30, 2019.    Review of Systems  Review of Systems   Constitutional: Negative for chills, fever and weight loss.   HENT: Negative for hearing loss and tinnitus.    Eyes: Negative for blurred vision, double vision, photophobia and pain.   Respiratory: Negative for cough, sputum production and shortness of breath.    Cardiovascular: Negative for chest pain, palpitations, orthopnea and leg swelling.   Gastrointestinal: Negative for abdominal pain, constipation, diarrhea, nausea and vomiting.   Genitourinary: Negative for dysuria, frequency and urgency.   Musculoskeletal: Positive for falls. Negative for back pain, joint pain, myalgias and neck pain.   Skin: Negative for rash.   Neurological: Positive for focal weakness (Bilateral lower  extremity). Negative for dizziness, tingling, tremors, sensory change, speech change and headaches.   Psychiatric/Behavioral: Negative for hallucinations and substance abuse.   All other systems reviewed and are negative.      Past Medical History   has a past medical history of Asthma; Breath shortness; Cold (2015); Dental disorder; Diabetes; Emphysema; Hiatus hernia syndrome; High cholesterol; Hyperlipidemia; Hypertension; Other emphysema (HCC); Paroxysmal atrial fibrillation ([facility]); Pneumonia (); Snoring; Stroke ([facility]) (); and Weakness.    Surgical History   has a past surgical history that includes carotid endarterectomy (2014); recovery (3/4/2015); and zzz cardiac cath.     Family History  I reviewed with patient.  Mom and dad diabetes  Mom COPD    Social History   reports that he quit smoking about 5 years ago. His smoking use included Cigarettes. He has a 110.00 pack-year smoking history. He has never used smokeless tobacco. He reports that he does not drink alcohol or use drugs.    Allergies  Allergies   Allergen Reactions   • Other Misc Rash     Cat Hair       Medications  Prior to Admission Medications   Prescriptions Last Dose Informant Patient Reported? Taking?   Arformoterol Tartrate (BROVANA) 15 MCG/2ML NEBU  Patient Yes No   Sig: Inhale  by mouth. Indications: Emphysema   Cholecalciferol (VITAMIN D-3) 1000 UNITS CAPS  Patient Yes No   Sig: Take 1 Cap by mouth every day.   albuterol (PROVENTIL) 2.5mg/3ml NEBU  Patient Yes No   Si.5 mg by Nebulization route every four hours as needed.   aspirin 81 MG tablet  Patient Yes No   Sig: Take 81 mg by mouth every day.   atorvastatin (LIPITOR) 20 MG Tab   No No   Sig: Take 1 Tab by mouth every day.   budesonide (PULMICORT) 0.25 MG/2ML SUSP  Patient Yes No   Si mcg by Nebulization route 2 times a day. Indications: Chronic Obstructive Lung Disease   carvedilol (COREG) 3.125 MG Tab   No No   Sig: Take 1 Tab by mouth 2 times a day, with meals.    Patient not taking: Reported on 1/3/2018   famotidine (PEPCID) 20 MG TABS  Patient Yes No   Sig: Take 20 mg by mouth every day.   furosemide (LASIX) 40 MG Tab   No No   Sig: Take 1 Tab by mouth every day.   glipiZIDE (GLUCOTROL) 5 MG TABS  Patient Yes No   Sig: Take 5 mg by mouth every morning.   lisinopril (PRINIVIL) 5 MG Tab   No No   Sig: Take 1 Tab by mouth every day.   metformin (GLUCOPHAGE) 500 MG TABS  Patient Yes No   Sig: Take 500 mg by mouth 2 times a day.   metformin ER (GLUCOPHAGE XR) 750 MG TABLET SR 24 HR   Yes No   Sig: Take 750 mg by mouth every day.   metolazone (ZAROXOLYN) 2.5 MG Tab   No No   Sig: Take 1 Tab by mouth 2X A WEEK.   Patient not taking: Reported on 1/3/2019   potassium chloride SA (K-DUR) 10 MEQ Tab CR   No No   Sig: Take 1 Tab by mouth every day. Take additional tablet when taking metolazone.      Facility-Administered Medications: None       Physical Exam  Temp:  [36.6 °C (97.8 °F)] 36.6 °C (97.8 °F)  Pulse:  [88] 88  Resp:  [16] 16  BP: (109)/(59) 109/59  SpO2:  [98 %] 98 %    Physical Exam   Constitutional: He is oriented to person, place, and time. No distress.   HENT:   Head: Normocephalic and atraumatic.   Eyes: Pupils are equal, round, and reactive to light. Right eye exhibits no discharge. Left eye exhibits no discharge.   Neck: Normal range of motion. Neck supple.   Cardiovascular: Normal rate and regular rhythm.  Exam reveals no friction rub.    No murmur heard.  Pulmonary/Chest: Effort normal and breath sounds normal. No respiratory distress. He has no wheezes.   Abdominal: Soft. Bowel sounds are normal. He exhibits no distension. There is no tenderness. There is no rebound.   Musculoskeletal: Normal range of motion. He exhibits edema. He exhibits no deformity.   Neurological: He is alert and oriented to person, place, and time. No cranial nerve deficit. Coordination normal.   Skin: Skin is warm and dry. No rash noted. He is not diaphoretic. No erythema.   Purulent  discharge coming out from left foot.  Dressing present   Psychiatric: He has a normal mood and affect. His behavior is normal.       Laboratory:  Recent Labs      06/25/19   1237   WBC  7.7   RBC  3.54*   HEMOGLOBIN  10.9*   HEMATOCRIT  35.5*   MCV  100.3*   MCH  30.8   MCHC  30.7*   RDW  52.8*   PLATELETCT  191   MPV  11.0     Recent Labs      06/25/19   1237   SODIUM  141   POTASSIUM  4.9   CHLORIDE  107   CO2  27   GLUCOSE  352*   BUN  29*   CREATININE  1.37   CALCIUM  8.8     Recent Labs      06/25/19   1237   ALTSGPT  20   ASTSGOT  14   ALKPHOSPHAT  94   TBILIRUBIN  0.6   GLUCOSE  352*         Recent Labs      06/25/19   1237   BNPBTYPENAT  980*         Recent Labs      06/25/19   1237   TROPONINI  0.10*       Urinalysis:    No results found     Imaging:  DX-SHOULDER 2+ RIGHT   Final Result      1.  No acute findings.      2.  Severe degenerative change in the right acromioclavicular joint.      DX-FOOT-COMPLETE 3+ RIGHT   Final Result      1.  Lucency in the right fifth toe, possibly representing osteomyelitis. If clinically indicated, MRI could be performed for confirmation.      2.  Soft tissue edema within the forefoot.      DX-FOOT-COMPLETE 3+ LEFT   Final Result      1.  No definitive evidence for osteomyelitis.      2.  Forefoot deformities as described.      DX-CHEST-PORTABLE (1 VIEW)   Final Result      Bilateral interstitial opacities may represent interstitial edema or pneumonitis.      Mild cardiomegaly.      Atherosclerotic plaque.               Assessment/Plan:  I anticipate this patient will require at least two midnights for appropriate medical management, necessitating inpatient admission.    Wound infection   Assessment & Plan    He found to have wound on left foot.  Dressing present at the site of wound.  Wound showed purulent discharge.  I ordered wound culture.  Started him on broad-spectrum antibiotic with vancomycin.  Vancomycin dose adjustment as per vancomycin trough.  Monitor for  toxicity.  Started him on Zosyn.  He found to have possible osteomyelitis on x-ray.  He needs infectious disease consult in morning.  I requested wound care consult and limb preservative services consult.     Elevated troponin   Assessment & Plan    He found to have mildly elevated troponin.  He denies any symptoms of chest pain.  I ordered echocardiogram.  Trend troponin.       CKD (chronic kidney disease), stage III (HCC)- (present on admission)   Assessment & Plan    History of chronic kidney disease.  Currently renal functions are at the baseline.  Avoid nephrotoxins.  Continue to monitor     Edema- (present on admission)   Assessment & Plan    He found to have bilateral lower extremity edema.  His BNP is elevated along with that he has equivocal elevation of troponin.  I ordered echocardiogram.  Started him on Lasix 20 mg IV twice daily.  I ordered ultrasound DVT for bilateral lower extremities.  He was evaluated by cardiology for lower extremity edema in January 2019.     Diabetes mellitus type 2, noninsulin dependent (HCC)- (present on admission)   Assessment & Plan    I held metformin due to possible intervention or imaging studies.  I ordered insulin sliding scale with hypoglycemia protocol.  I ordered HbA1c     Essential hypertension, benign- (present on admission)   Assessment & Plan    Continue outpatient medications.  Admit to medical floor and monitor.         VTE prophylaxis: Heparin

## 2019-06-25 NOTE — ED PROVIDER NOTES
ED Provider  Scribed for Kyle Villalobos D.O. by Pedrito Franco. 6/25/2019  1:03 PM    Means of arrival:Alex  History obtained from:Patient's relative  History limited by: None    CHIEF COMPLAINT  Chief Complaint   Patient presents with   • Foot Swelling     BL LE swelling and edema w wounds to feet bilaterally   • Shoulder Pain     Pt states he fell one or two days ago and is having right shoulder pain   • Fall   • Barky Cough     Pt 88% on room air w/ ems and requiring 2L NC to maintain saturations over 90%       HPI  David Figueroa is a 79 y.o. male who presents to the ED complaining of constant bilateral lower leg swelling onset 2.5 years ago. The patient reports that for the past 2.5 years, he has been visiting wound care to monitor the wounds located on his bilateral feet. He reports that his bilateral lower extremities have recently worsened, which prompted his visit to the ED. He reports associated new erythema on his bilateral lower legs, bilateral foot pain, swelling in his genital area, and difficulty ambulating. He denies any associated dyspnea, shortness of breath, fevers, chills, diaphoresis, nausea, vomiting, or diarrhea. No alleviating or exacerbating factors were identified for the patient's bilateral lower leg swelling.    In addition to the patient's bilateral lower leg swelling, the patient states that he also injured his right shoulder 2 days ago when he fell. He reports associated constant localized right shoulder pain, but denies any weakness or loss of sensation in his right shoulder. No alleviating or exacerbating factors were identified.    REVIEW OF SYSTEMS  See HPI for further details. All other systems are negative.     PAST MEDICAL HISTORY   has a past medical history of Asthma; Breath shortness; Cold (1/2015); Dental disorder; Diabetes; Emphysema; Hiatus hernia syndrome; High cholesterol; Hyperlipidemia; Hypertension; Other emphysema (HCC); Paroxysmal atrial fibrillation  ([facility]); Pneumonia (2013); Snoring; Stroke ([facility]) (1999); and Weakness.    SOCIAL HISTORY  Social History     Social History Main Topics   • Smoking status: Former Smoker     Packs/day: 2.00     Years: 55.00     Types: Cigarettes     Quit date: 7/1/2013   • Smokeless tobacco: Never Used   • Alcohol use No   • Drug use: No            SURGICAL HISTORY   has a past surgical history that includes carotid endarterectomy (9/17/2014); recovery (3/4/2015); and zzz cardiac cath.    CURRENT MEDICATIONS  Home Medications     Reviewed by Jennifer Taylor (Pharmacy Tech) on 06/25/19 at 1545  Med List Status: Complete   Medication Last Dose Status   aspirin 81 MG tablet 6/22/2019 Active   atorvastatin (LIPITOR) 20 MG Tab 6/22/2019 Active   carvedilol (COREG) 3.125 MG Tab 6/22/2019 Active   cephALEXin (KEFLEX) 500 MG Cap 6/22/2019 Active   famotidine (PEPCID) 20 MG TABS 6/22/2019 Active   furosemide (LASIX) 40 MG Tab 6/22/2019 Active   metformin ER (GLUCOPHAGE XR) 750 MG TABLET SR 24 HR 6/22/2019 Active   potassium chloride SA (K-DUR) 10 MEQ Tab CR 6/22/2019 Active                ALLERGIES  Allergies   Allergen Reactions   • Other Misc Rash     Cat Hair       PHYSICAL EXAM  VITAL SIGNS: /59   Pulse 88   Temp 36.6 °C (97.8 °F) (Tympanic)   Resp 16   Ht 1.829 m (6')   Wt 113 kg (249 lb 1.9 oz)   SpO2 98%   BMI 33.79 kg/m²   Constitutional: Alert in no apparent distress.  HENT: No signs of trauma, mucous membranes are moist  Eyes: Conjunctiva normal, Non-icteric.   Neck: Normal range of motion, No tenderness, Supple.  Lymphatic: No lymphadenopathy noted.   Cardiovascular: Regular rate and rhythm, no murmurs.   Thorax & Lungs: Normal breath sounds, No respiratory distress, No wheezing, No chest tenderness.   Abdomen: Large umbilical hernia which is soft and reducible. Bowel sounds normal, Soft, No tenderness, No pulsatile masses. No peritoneal signs.  Skin: Erythematous dermatitis versus cellulitis of both calves and  shins. Warm, Dry.   Back: No bony tenderness, No CVA tenderness.   Extremities: Bilateral lower extremity 3+ pitting edema extending from thighs to feet. Grade 2-3 open ulcer at ball of left foot with purulent drainage, no erythema. Grade 2-3 open sore at 5th metacarpal distal portion of right foot with no drainage. Capillary refill of toes is normal. No tenderness, No cyanosis  Musculoskeletal: Good range of motion in all major joints. No tenderness to palpation or major deformities noted.   Neurologic: Alert and oriented x4, Normal motor function, Normal sensory function, No focal deficits noted.   Psychiatric: Affect normal, Judgment normal, Mood normal.     MEDICAL DECISION MAKING  This is a 79 y.o. male who presents with chronic lower extremity edema, and worsening of chronic wounds of the feet.  The patient had been on wound care, 1 month ago the wounds were closed and now they have both reopened.  The left foot and the right foot.  He does have erythema of the shins, and calf which may be cellulitis versus atrophic dermatitis.  His white blood cell count is normal.  His BNP is elevated chest x-ray is suggesting possible pulmonary edema.  He has no history of congestive heart failure.  His troponin is minimally elevated at 0.1.    The patient does have a history of cardiac disease but no history of congestive heart failure.  The patient will be admitted for further evaluation and treatment.    Repeat phone call from Dr. Ng's recommendation is consult to limb preservation.    DIAGNOSTIC STUDIES / PROCEDURES    EKG  12 Lead EKG interpreted by me shown below.     LABS  Results for orders placed or performed during the hospital encounter of 06/25/19   CBC WITH DIFFERENTIAL   Result Value Ref Range    WBC 7.7 4.8 - 10.8 K/uL    RBC 3.54 (L) 4.70 - 6.10 M/uL    Hemoglobin 10.9 (L) 14.0 - 18.0 g/dL    Hematocrit 35.5 (L) 42.0 - 52.0 %    .3 (H) 81.4 - 97.8 fL    MCH 30.8 27.0 - 33.0 pg    MCHC 30.7 (L) 33.7  - 35.3 g/dL    RDW 52.8 (H) 35.9 - 50.0 fL    Platelet Count 191 164 - 446 K/uL    MPV 11.0 9.0 - 12.9 fL    Neutrophils-Polys 66.40 44.00 - 72.00 %    Lymphocytes 20.00 (L) 22.00 - 41.00 %    Monocytes 11.50 0.00 - 13.40 %    Eosinophils 1.40 0.00 - 6.90 %    Basophils 0.40 0.00 - 1.80 %    Immature Granulocytes 0.30 0.00 - 0.90 %    Nucleated RBC 0.00 /100 WBC    Neutrophils (Absolute) 5.14 1.82 - 7.42 K/uL    Lymphs (Absolute) 1.55 1.00 - 4.80 K/uL    Monos (Absolute) 0.89 (H) 0.00 - 0.85 K/uL    Eos (Absolute) 0.11 0.00 - 0.51 K/uL    Baso (Absolute) 0.03 0.00 - 0.12 K/uL    Immature Granulocytes (abs) 0.02 0.00 - 0.11 K/uL    NRBC (Absolute) 0.00 K/uL   COMP METABOLIC PANEL   Result Value Ref Range    Sodium 141 135 - 145 mmol/L    Potassium 4.9 3.6 - 5.5 mmol/L    Chloride 107 96 - 112 mmol/L    Co2 27 20 - 33 mmol/L    Anion Gap 7.0 0.0 - 11.9    Glucose 352 (H) 65 - 99 mg/dL    Bun 29 (H) 8 - 22 mg/dL    Creatinine 1.37 0.50 - 1.40 mg/dL    Calcium 8.8 8.5 - 10.5 mg/dL    AST(SGOT) 14 12 - 45 U/L    ALT(SGPT) 20 2 - 50 U/L    Alkaline Phosphatase 94 30 - 99 U/L    Total Bilirubin 0.6 0.1 - 1.5 mg/dL    Albumin 3.2 3.2 - 4.9 g/dL    Total Protein 6.3 6.0 - 8.2 g/dL    Globulin 3.1 1.9 - 3.5 g/dL    A-G Ratio 1.0 g/dL   Troponin   Result Value Ref Range    Troponin I 0.10 (H) 0.00 - 0.04 ng/mL   BNP   Result Value Ref Range    B Natriuretic Peptide 980 (H) 0 - 100 pg/mL   LACTIC ACID   Result Value Ref Range    Lactic Acid 1.9 0.5 - 2.0 mmol/L   ESTIMATED GFR   Result Value Ref Range    GFR If African American >60 >60 mL/min/1.73 m 2    GFR If Non African American 50 (A) >60 mL/min/1.73 m 2   EKG   Result Value Ref Range    Report       [facility]    Test Date:  2019  Pt Name:    TATYANA AMIN                 Department: ER  MRN:        [id number]                      Room:       [facility]  Gender:     Male                         Technician: [id number]  :        [id number]                    Requested By:ER TRIAGE PROTOCOL  Order #:    [id number]                    Reading MD: STEWART WOOD D.O.    Measurements  Intervals                                Axis  Rate:       89                           P:          0  WV:         157                          QRS:        14  QRSD:       116                          T:          205  QT:         372  QTc:        453    Interpretive Statements  SINUS RHYTHM  NONSPECIFIC INTRAVENTRICULAR CONDUCTION DELAY  LOW VOLTAGE IN FRONTAL LEADS  MINIMAL ST DEPRESSION, INFERIOR LEADS  Compared to ECG 11/02/2017 15:21:54  Intraventricular conduction delay now present  ST (T wave) deviation now present  Myocardial infarct finding no longer present    Elect ronically Signed On 6- 15:23:39 PDT by STEWART WOOD D.O.       All labs reviewed by me.    RADIOLOGY  DX-SHOULDER 2+ RIGHT   Final Result      1.  No acute findings.      2.  Severe degenerative change in the right acromioclavicular joint.      DX-FOOT-COMPLETE 3+ RIGHT   Final Result      1.  Lucency in the right fifth toe, possibly representing osteomyelitis. If clinically indicated, MRI could be performed for confirmation.      2.  Soft tissue edema within the forefoot.      DX-FOOT-COMPLETE 3+ LEFT   Final Result      1.  No definitive evidence for osteomyelitis.      2.  Forefoot deformities as described.      DX-CHEST-PORTABLE (1 VIEW)   Final Result      Bilateral interstitial opacities may represent interstitial edema or pneumonitis.      Mild cardiomegaly.      Atherosclerotic plaque.           The radiologist's interpretations of all radiological studies have been reviewed by me.        COURSE  Pertinent Labs & Imaging studies reviewed. (See chart for details)    1:03 PM - Patient seen and examined at bedside. Discussed plan of care. Ordered for DX-Foot right, DX-foot left, DX-Chest, DX-Shoulder Right, Westergren Sed, Blood Culture, BNP, Lactic Acid, CBC Diff, CMP, Troponin, Estimated  GFR, and EKG to evaluate his symptoms.     2:53 PM - Paged Hospitalist    3:10 PM - 3:15 PM I discussed the patient's case and the above findings with Dr. Winters (Hospitalist) who agrees to admit the patient.    3:13 PM - Patient was reevaluated at bedside. Discussed lab and radiology results with the patient and informed them that they will be admitted to Dr. Winters for admission. The patient's family is understanding and agreeable to the plan of care.     3:14 PM - Paged Ortho    3:36 PM - Paged Ortho    3:55 PM - I discussed the patient's case and the above findings with Dr. Rivera (Ortho).    DISPOSITION:  Patient will be admitted to Dr. Winters (Hospitalist) in guarded condition.    FINAL IMPRESSION  1. Acute pulmonary edema (HCC)    2. Chronic osteomyelitis of right foot with draining sinus (HCC)       IPedrito (Scribe), am scribing for, and in the presence of, Kyle Villalobos D.O..    Electronically signed by: Pedrito Franco (Scribe), 6/25/2019    IKyle D.O. personally performed the services described in this documentation, as scribed by Pedrito Franco in my presence, and it is both accurate and complete.    C    The note accurately reflects work and decisions made by me.  Kyle Villalobos  6/25/2019  6:02 PM

## 2019-06-25 NOTE — ED NOTES
Med Rec complete per Pt and Pt's wife at bedside   Ok per Pt to discuss medications with visitor/s present    Allergies Reviewed  Pt has been taking KEFLEX for many months with no known stop date.    Pt states that he only takes his medications when he remembers.

## 2019-06-26 PROBLEM — T14.8XXA WOUND INFECTION: Status: ACTIVE | Noted: 2019-06-26

## 2019-06-26 PROBLEM — E11.621 DIABETIC ULCER OF LEFT FOOT (HCC): Status: ACTIVE | Noted: 2019-06-26

## 2019-06-26 PROBLEM — D75.89 MACROCYTOSIS: Status: ACTIVE | Noted: 2019-06-26

## 2019-06-26 PROBLEM — R79.89 ELEVATED TROPONIN: Status: ACTIVE | Noted: 2019-06-26

## 2019-06-26 PROBLEM — L97.529 DIABETIC ULCER OF LEFT FOOT (HCC): Status: ACTIVE | Noted: 2019-06-26

## 2019-06-26 PROBLEM — L08.9 WOUND INFECTION: Status: ACTIVE | Noted: 2019-06-26

## 2019-06-26 PROBLEM — I87.2 VENOUS STASIS DERMATITIS OF BOTH LOWER EXTREMITIES: Status: ACTIVE | Noted: 2019-06-26

## 2019-06-26 PROBLEM — E11.628 DIABETIC FOOT INFECTION (HCC): Status: ACTIVE | Noted: 2019-06-26

## 2019-06-26 LAB
EST. AVERAGE GLUCOSE BLD GHB EST-MCNC: 249 MG/DL
FOLATE SERPL-MCNC: 14.9 NG/ML
GLUCOSE BLD-MCNC: 106 MG/DL (ref 65–99)
GLUCOSE BLD-MCNC: 148 MG/DL (ref 65–99)
GLUCOSE BLD-MCNC: 205 MG/DL (ref 65–99)
GLUCOSE BLD-MCNC: 240 MG/DL (ref 65–99)
HBA1C MFR BLD: 10.3 % (ref 0–5.6)
PROCALCITONIN SERPL-MCNC: <0.05 NG/ML
TROPONIN I SERPL-MCNC: 0.14 NG/ML (ref 0–0.04)
TROPONIN I SERPL-MCNC: 0.14 NG/ML (ref 0–0.04)
TSH SERPL DL<=0.005 MIU/L-ACNC: 1.26 UIU/ML (ref 0.38–5.33)
VIT B12 SERPL-MCNC: 575 PG/ML (ref 211–911)

## 2019-06-26 PROCEDURE — 83036 HEMOGLOBIN GLYCOSYLATED A1C: CPT

## 2019-06-26 PROCEDURE — 700105 HCHG RX REV CODE 258: Performed by: NURSE PRACTITIONER

## 2019-06-26 PROCEDURE — 84484 ASSAY OF TROPONIN QUANT: CPT

## 2019-06-26 PROCEDURE — A9270 NON-COVERED ITEM OR SERVICE: HCPCS | Performed by: INTERNAL MEDICINE

## 2019-06-26 PROCEDURE — 700111 HCHG RX REV CODE 636 W/ 250 OVERRIDE (IP): Performed by: INTERNAL MEDICINE

## 2019-06-26 PROCEDURE — 700102 HCHG RX REV CODE 250 W/ 637 OVERRIDE(OP): Performed by: FAMILY MEDICINE

## 2019-06-26 PROCEDURE — 82746 ASSAY OF FOLIC ACID SERUM: CPT

## 2019-06-26 PROCEDURE — 36415 COLL VENOUS BLD VENIPUNCTURE: CPT

## 2019-06-26 PROCEDURE — 99222 1ST HOSP IP/OBS MODERATE 55: CPT | Mod: GC | Performed by: INTERNAL MEDICINE

## 2019-06-26 PROCEDURE — A9270 NON-COVERED ITEM OR SERVICE: HCPCS | Performed by: NURSE PRACTITIONER

## 2019-06-26 PROCEDURE — 700105 HCHG RX REV CODE 258: Performed by: INTERNAL MEDICINE

## 2019-06-26 PROCEDURE — 700111 HCHG RX REV CODE 636 W/ 250 OVERRIDE (IP): Performed by: NURSE PRACTITIONER

## 2019-06-26 PROCEDURE — 84443 ASSAY THYROID STIM HORMONE: CPT

## 2019-06-26 PROCEDURE — 82962 GLUCOSE BLOOD TEST: CPT | Mod: 91

## 2019-06-26 PROCEDURE — 700102 HCHG RX REV CODE 250 W/ 637 OVERRIDE(OP): Performed by: NURSE PRACTITIONER

## 2019-06-26 PROCEDURE — 84145 PROCALCITONIN (PCT): CPT

## 2019-06-26 PROCEDURE — 0KDW0ZZ EXTRACTION OF LEFT FOOT MUSCLE, OPEN APPROACH: ICD-10-PCS | Performed by: ORTHOPAEDIC SURGERY

## 2019-06-26 PROCEDURE — 0KDV0ZZ EXTRACTION OF RIGHT FOOT MUSCLE, OPEN APPROACH: ICD-10-PCS | Performed by: ORTHOPAEDIC SURGERY

## 2019-06-26 PROCEDURE — 82607 VITAMIN B-12: CPT

## 2019-06-26 PROCEDURE — 700102 HCHG RX REV CODE 250 W/ 637 OVERRIDE(OP): Performed by: INTERNAL MEDICINE

## 2019-06-26 PROCEDURE — 99233 SBSQ HOSP IP/OBS HIGH 50: CPT | Performed by: FAMILY MEDICINE

## 2019-06-26 PROCEDURE — 770006 HCHG ROOM/CARE - MED/SURG/GYN SEMI*

## 2019-06-26 RX ORDER — DOXYCYCLINE 100 MG/1
100 TABLET ORAL EVERY 12 HOURS
Status: DISCONTINUED | OUTPATIENT
Start: 2019-06-26 | End: 2019-06-26

## 2019-06-26 RX ORDER — LINEZOLID 2 MG/ML
600 INJECTION, SOLUTION INTRAVENOUS EVERY 12 HOURS
Status: DISCONTINUED | OUTPATIENT
Start: 2019-06-26 | End: 2019-06-26

## 2019-06-26 RX ORDER — LINEZOLID 600 MG/1
600 TABLET, FILM COATED ORAL EVERY 12 HOURS
Status: DISCONTINUED | OUTPATIENT
Start: 2019-06-26 | End: 2019-07-01

## 2019-06-26 RX ADMIN — PIPERACILLIN AND TAZOBACTAM 3.38 G: 3; .375 INJECTION, POWDER, LYOPHILIZED, FOR SOLUTION INTRAVENOUS; PARENTERAL at 05:54

## 2019-06-26 RX ADMIN — FAMOTIDINE 20 MG: 20 TABLET ORAL at 05:55

## 2019-06-26 RX ADMIN — LINEZOLID 600 MG: 600 TABLET, FILM COATED ORAL at 17:50

## 2019-06-26 RX ADMIN — FUROSEMIDE 20 MG: 10 INJECTION, SOLUTION INTRAVENOUS at 17:39

## 2019-06-26 RX ADMIN — ATORVASTATIN CALCIUM 20 MG: 20 TABLET, FILM COATED ORAL at 05:55

## 2019-06-26 RX ADMIN — HEPARIN SODIUM 5000 UNITS: 5000 INJECTION, SOLUTION INTRAVENOUS; SUBCUTANEOUS at 15:14

## 2019-06-26 RX ADMIN — ASPIRIN 81 MG 81 MG: 81 TABLET ORAL at 05:55

## 2019-06-26 RX ADMIN — SENNOSIDES, DOCUSATE SODIUM 2 TABLET: 50; 8.6 TABLET, FILM COATED ORAL at 05:55

## 2019-06-26 RX ADMIN — CARVEDILOL 3.12 MG: 3.12 TABLET, FILM COATED ORAL at 17:50

## 2019-06-26 RX ADMIN — HEPARIN SODIUM 5000 UNITS: 5000 INJECTION, SOLUTION INTRAVENOUS; SUBCUTANEOUS at 05:56

## 2019-06-26 RX ADMIN — HEPARIN SODIUM 5000 UNITS: 5000 INJECTION, SOLUTION INTRAVENOUS; SUBCUTANEOUS at 21:20

## 2019-06-26 RX ADMIN — INSULIN HUMAN 4 UNITS: 100 INJECTION, SOLUTION PARENTERAL at 17:49

## 2019-06-26 RX ADMIN — AMPICILLIN SODIUM AND SULBACTAM SODIUM 3 G: 2; 1 INJECTION, POWDER, FOR SOLUTION INTRAMUSCULAR; INTRAVENOUS at 21:22

## 2019-06-26 RX ADMIN — CARVEDILOL 3.12 MG: 3.12 TABLET, FILM COATED ORAL at 08:47

## 2019-06-26 RX ADMIN — AMPICILLIN SODIUM AND SULBACTAM SODIUM 3 G: 2; 1 INJECTION, POWDER, FOR SOLUTION INTRAMUSCULAR; INTRAVENOUS at 15:14

## 2019-06-26 RX ADMIN — INSULIN HUMAN 4 UNITS: 100 INJECTION, SOLUTION PARENTERAL at 15:13

## 2019-06-26 RX ADMIN — FUROSEMIDE 20 MG: 10 INJECTION, SOLUTION INTRAVENOUS at 06:04

## 2019-06-26 RX ADMIN — POTASSIUM CHLORIDE 10 MEQ: 1500 TABLET, EXTENDED RELEASE ORAL at 05:55

## 2019-06-26 ASSESSMENT — COGNITIVE AND FUNCTIONAL STATUS - GENERAL
DAILY ACTIVITIY SCORE: 18
DRESSING REGULAR LOWER BODY CLOTHING: A LITTLE
STANDING UP FROM CHAIR USING ARMS: A LITTLE
HELP NEEDED FOR BATHING: A LITTLE
EATING MEALS: A LITTLE
WALKING IN HOSPITAL ROOM: A LITTLE
MOVING TO AND FROM BED TO CHAIR: A LITTLE
MOBILITY SCORE: 18
TOILETING: A LITTLE
MOVING FROM LYING ON BACK TO SITTING ON SIDE OF FLAT BED: A LITTLE
SUGGESTED CMS G CODE MODIFIER MOBILITY: CK
CLIMB 3 TO 5 STEPS WITH RAILING: A LITTLE
DRESSING REGULAR UPPER BODY CLOTHING: A LITTLE
TURNING FROM BACK TO SIDE WHILE IN FLAT BAD: A LITTLE
PERSONAL GROOMING: A LITTLE
SUGGESTED CMS G CODE MODIFIER DAILY ACTIVITY: CK

## 2019-06-26 ASSESSMENT — ENCOUNTER SYMPTOMS
NAUSEA: 0
WHEEZING: 1
SORE THROAT: 0
TINGLING: 0
CHILLS: 0
PALPITATIONS: 0
SENSORY CHANGE: 0
BLURRED VISION: 0
DOUBLE VISION: 0
VOMITING: 0
DIZZINESS: 0
DIARRHEA: 0
FOCAL WEAKNESS: 0
HEADACHES: 0
CONSTIPATION: 0
MYALGIAS: 0
ABDOMINAL PAIN: 0
SPEECH CHANGE: 0
COUGH: 0
SHORTNESS OF BREATH: 0
FEVER: 0
TREMORS: 0

## 2019-06-26 ASSESSMENT — COPD QUESTIONNAIRES
HAVE YOU SMOKED AT LEAST 100 CIGARETTES IN YOUR ENTIRE LIFE: YES
DO YOU EVER COUGH UP ANY MUCUS OR PHLEGM?: YES, A FEW DAYS A WEEK OR MONTH
DURING THE PAST 4 WEEKS HOW MUCH DID YOU FEEL SHORT OF BREATH: SOME OF THE TIME
COPD SCREENING SCORE: 6

## 2019-06-26 ASSESSMENT — LIFESTYLE VARIABLES: EVER_SMOKED: YES

## 2019-06-26 NOTE — PROGRESS NOTES
Pharmacy Kinetics 79 y.o. male on vancomycin day # 1   2019    Vancomycin New Start   : Vanco load 2800 mg IV at 1545    Indication for Treatment: Empiric - diabetic foot wound, possible osteomyelitis    Pertinent history per medical record: Admitted on 2019 for bilateral lower extremity swelling and concern for osteomyelitis. Patient reportedly followed by wound care for ~2.5 years for bilateral foot wounds, is currently on oral cephalexin. Empiric antibiotics initiated for treatment of his wounds.     Other antibiotics: piperacillin/tazobactam 3.375 g IV q6hrs    Allergies: Other misc     Concerns for renal function include age, BUN/SCr ratio > 20:1, DM & obesity (BMI 34 kg/m 2).    Pertinent cultures to date:   : peripheral blood cx X2 - in process  : wound cx - pending collection    MRSA nares swab if pneumonia is a concern (ordered/positive/negative/n-a): N/A    Imagin/25: R foot xray - Lucency in the right fifth toe, possibly representing osteomyelitis. If clinically indicated, MRI could be performed for confirmation.  : L foot xray - No definitive evidence for osteomyelitis.    Recent Labs      19   1237   WBC  7.7   NEUTSPOLYS  66.40     Recent Labs      19   1237   BUN  29*   CREATININE  1.37   ALBUMIN  3.2     No results for input(s): VANCOTROUGH, VANCOPEAK, VANCORANDOM in the last 72 hours.    No intake or output data in the 24 hours ending 19 1859     /59   Pulse 88   Temp 36.6 °C (97.8 °F) (Tympanic)   Resp 16   Ht 1.829 m (6')   Wt 113 kg (249 lb 1.9 oz)   SpO2 98%  Temp (24hrs), Av.6 °C (97.8 °F), Min:36.6 °C (97.8 °F), Max:36.6 °C (97.8 °F)      A/P   1. Vancomycin dose change: Start vanco 2300 mg IV q24hrs  2. Next vancomycin level: ~3 days (not currently ordered)  3. Goal trough: 12-16 mcg/mL  4. Comments: Empiric vancomycin initiated in patient with multiple risk factors for accumulation. No vanco dosing history available in EPIC.  Blood cultures in process, wound cultures pending collection. Limited provider information available in chart at this time. Will start once daily vanco regimen per P&T protocol and plan trough at steady state to evaluate and adjust as necessary.    Pharmacy will continue to follow.     Claudette Cedillo, AndreaD

## 2019-06-26 NOTE — PROGRESS NOTES
I was contacted by Dr. Villalobos in the ED with regard to evaluate this patient with a right diabetic foot wound with suspected osteomyelitis.  I recommended that the hospitalist service initiate a consultation to the limb preservation to initiate a formal evaluation for this patient on a routine basis.

## 2019-06-26 NOTE — RESPIRATORY CARE
COPD EDUCATION by COPD CLINICAL EDUCATOR  6/26/2019 at 8:23 AM by Teetee Evans     Patient's chart reviewed by COPD education team. Patient does not have an order for Respiratory Care Protocol, therefore the COPD education team cannot treat.

## 2019-06-26 NOTE — PROGRESS NOTES
[facility] Medicine Daily Progress Note    Date of Service  6/26/2019    Chief Complaint  79 y.o. male admitted 6/25/2019 with BLE swelling and diabetic foot infection.    Hospital Course    H/O COPD, CAD, HTN, DM, HLD.  Patient follows with outpatient wound care for chronic, nonhealing bilateral diabetic foot wounds.  Presented to ED after developing worsening erythema and swelling of BLE as well as purulent drainage from foot wounds.  Started on antibiotics.  ID and LPS consulted.  Found to have mildly elevated troponin with stable EKG.  Suspect volume overload with elevated BNP and edema noted on chest x-ray.  Continue diuresis.  Echo pending.          Interval Problem Update  6/26:  Bilateral foot wounds noted with purulent drainage.  Dressing intact.  Denies pain.  Improvement of BLE edema with diuresis, although noted to have stasis dermatitis.  Wheezing noted on ausculation.  Denies shortness of breath.  Pro curt negative.  Bilateral opacities noted on CXR.  Echo pending.     Consultants/Specialty  ID  LPS    Code Status  FULL    Disposition  TBD.     Review of Systems  Review of Systems   Constitutional: Negative for chills, fever and malaise/fatigue.   HENT: Negative for congestion and sore throat.    Eyes: Negative for blurred vision and double vision.   Respiratory: Positive for wheezing. Negative for cough and shortness of breath.    Cardiovascular: Positive for leg swelling. Negative for chest pain and palpitations.   Gastrointestinal: Negative for abdominal pain, constipation, diarrhea, nausea and vomiting.   Genitourinary: Negative for dysuria.   Musculoskeletal: Negative for joint pain and myalgias.   Skin: Negative for itching.        BLE swelling and erythema.  Bilateral foot wounds.    Neurological: Negative for dizziness, tingling, tremors, sensory change, speech change, focal weakness and headaches.        Physical Exam  Temp:  [36.2 °C (97.2 °F)-36.7 °C (98 °F)] 36.2 °C (97.2 °F)  Pulse:  [77-94]  77  Resp:  [12-20] 16  BP: ()/(68-88) 121/68  SpO2:  [91 %-99 %] 95 %    Physical Exam   Constitutional: He is oriented to person, place, and time. He appears well-developed and well-nourished. No distress.   Obese male.    HENT:   Head: Normocephalic and atraumatic.   Eyes: Conjunctivae are normal. Right eye exhibits no discharge. Left eye exhibits no discharge.   Neck: Normal range of motion. No tracheal deviation present.   Cardiovascular: Normal rate.    No murmur heard.  BLE edema   Pulmonary/Chest: Effort normal. No stridor. No respiratory distress. He has decreased breath sounds in the right lower field and the left lower field. He has wheezes in the right lower field, the left upper field, the left middle field and the left lower field.   Abdominal: Soft. Bowel sounds are normal. He exhibits no distension. There is no tenderness. There is no rebound.   Musculoskeletal: Normal range of motion. He exhibits no deformity.   Neurological: He is alert and oriented to person, place, and time. No cranial nerve deficit.   Skin: He is not diaphoretic. There is erythema.   Stasis dermatitis noted to BLE   Bilateral diabetic foot wounds with purulent drainage.  Dressing intact.    Psychiatric: He has a normal mood and affect.   Nursing note and vitals reviewed.      Fluids    Intake/Output Summary (Last 24 hours) at 06/26/19 1257  Last data filed at 06/26/19 0355   Gross per 24 hour   Intake                0 ml   Output              200 ml   Net             -200 ml       Laboratory  Recent Labs      06/25/19   1237   WBC  7.7   RBC  3.54*   HEMOGLOBIN  10.9*   HEMATOCRIT  35.5*   MCV  100.3*   MCH  30.8   MCHC  30.7*   RDW  52.8*   PLATELETCT  191   MPV  11.0     Recent Labs      06/25/19   1237   SODIUM  141   POTASSIUM  4.9   CHLORIDE  107   CO2  27   GLUCOSE  352*   BUN  29*   CREATININE  1.37   CALCIUM  8.8         Recent Labs      06/25/19   1237   BNPBTYPENAT  980*           Imaging  DX-SHOULDER 2+ RIGHT    Final Result      1.  No acute findings.      2.  Severe degenerative change in the right acromioclavicular joint.      DX-FOOT-COMPLETE 3+ RIGHT   Final Result      1.  Lucency in the right fifth toe, possibly representing osteomyelitis. If clinically indicated, MRI could be performed for confirmation.      2.  Soft tissue edema within the forefoot.      DX-FOOT-COMPLETE 3+ LEFT   Final Result      1.  No definitive evidence for osteomyelitis.      2.  Forefoot deformities as described.      DX-CHEST-PORTABLE (1 VIEW)   Final Result      Bilateral interstitial opacities may represent interstitial edema or pneumonitis.      Mild cardiomegaly.      Atherosclerotic plaque.         EC-ECHOCARDIOGRAM COMPLETE W/O CONT    (Results Pending)   US-EXTREMITY VENOUS LOWER BILAT    (Results Pending)   US-EXTREMITY ARTERY LOWER BILAT W/RACHEL (COMBO)    (Results Pending)        Assessment/Plan  Diabetic ulcer of left foot (HCC)   Assessment & Plan    History of chronic diabetic wounds to bilateral feet.  Has been following with outpatient wound clinic  Recent worsening of wounds with purulent drainage.   Cultures pending.   ID consulted for antibiotics.   Continue zosyn for now.   He found to have possible right foot osteomyelitis on x-ray.  LPS consulted.        Venous stasis dermatitis of both lower extremities   Assessment & Plan    Secondary to chronic edema  Continue lasix.  Skin care.      Macrocytosis   Assessment & Plan    TSH, vitamin B12 and folic acid wnl.     Elevated troponin   Assessment & Plan    He found to have mildly elevated troponin.  He denies any symptoms of chest pain.  EKG stable  ? Stress induced secondary to possible volume overload  Echo pending.   Continue lasix.   Trend troponin.       CKD (chronic kidney disease), stage III ([facility])- (present on admission)   Assessment & Plan    History of chronic kidney disease.  Currently renal functions are at the baseline.  Avoid nephrotoxins.  Continue to  monitor     CAD (coronary artery disease)- (present on admission)   Assessment & Plan    Hx of  Continue lipitor and ASA.      Mixed hyperlipidemia- (present on admission)   Assessment & Plan    Continue lipitor.      Edema- (present on admission)   Assessment & Plan    Hx of chronic BLE edema.  Worse prior to admit.  BNP elevated.   No hx of CHF.  Echo pending.   LE US pending.  Continue IV lasix.        COPD (chronic obstructive pulmonary disease) (HCC)- (present on admission)   Assessment & Plan    Wheezing noted on exam.  Denies shortness of breath  Chest x-ray shows bilateral interstitial opacities   Pro curt negative  Start doxycycline   Continue RT protocol, BT, and supplemental o2.      Type 2 diabetes mellitus with kidney complication, without long-term current use of insulin (HCC)- (present on admission)   Assessment & Plan    Hold metformin  Continue SSI while in the hospital   A1c pending  Diabetic diet and accuchecks.      Essential hypertension, benign- (present on admission)   Assessment & Plan    Controlled.  Continue coreg.             VTE prophylaxis: Heparin

## 2019-06-26 NOTE — CARE PLAN
Problem: Infection  Goal: Will remain free from infection  Outcome: PROGRESSING AS EXPECTED  Pt receiving iv antibiotics. Labs and vital signs being monitored.     Problem: Skin Integrity  Goal: Risk for impaired skin integrity will decrease  Outcome: PROGRESSING AS EXPECTED  Wound consult placed for wounds to bilateral feet. Skin thoroughly assessed.

## 2019-06-26 NOTE — CONSULTS
ADULT INFECTIOUS DISEASE CONSULT     Date of Service: 6/26/2019    Consult Requested By: Errol Sotomayor M.D.    Reason for Consultation: Diabetic foot wound    History of Present Illness:   David Figueroa is a 79 yr old with a past medical history of NIDDM, HTN, dependent edema, chronic left lower plantar diabetic wound previously undergoing wound therapy, recently discharge presented to [facility] on 6/25 for worsening foot wound along with new onset bilateral pedal edema. He reports partial loss of sensation on the bottom of his feet. Reports onset of edema 3-4 weeks ago which is progressive. Has stasis dermatitis bilaterally. LPS consult pending. Xray suggestive of OM. Currently on Zosyn and vancomycin. TTE and bilateral LE duplex pending.     Review Of Systems:  REVIEW OF SYSTEMS:   Constitutional: Negative for fever, chills, weight loss and malaise/fatigue.    HEENT: No new auditory or visual complaints. No sore throat and neck pain.     Respiratory: Negative for cough, sputum production, shortness of breath and wheezing.    Cardiovascular: Negative for chest pain, palpitations, orthopnea and leg swelling.    Gastrointestinal: Negative for heartburn, nausea, vomiting and abdominal pain.    Genitourinary: Negative for dysuria, hematuria    Musculoskeletal: No new arthralgias or myalgias   Skin: Negative for rash and itching.    Neurological: Negative for focal weakness and headaches.    Endo/Heme/Allergies: No abnormal bleed/bruise.    Psychiatric/Behavioral: No new depression/anxiety.        PMH:   Past Medical History:   Diagnosis Date   • Asthma    • Breath shortness     O2  3 l/m at night   • Cold 1/2015   • Dental disorder     upper lower   • Diabetes     oral meds   • Emphysema     O2 3 liters prn   • Hiatus hernia syndrome    • High cholesterol    • Hyperlipidemia    • Hypertension    • Other emphysema (HCC)    • Paroxysmal atrial fibrillation (HCC)    • Pneumonia 2013   • Snoring    • Stroke (HCC)  1999   • Weakness     since pneumonia         PSH:  Past Surgical History:   Procedure Laterality Date   • RECOVERY  3/4/2015    Performed by Cath-Recovery Surgery at SURGERY [facility] DAY [facility] ORS   • CAROTID ENDARTERECTOMY  9/17/2014    Performed by Willy Solares M.D. at SURGERY [facility] ORS   • ZZZ CARDIAC CATH      inoperable disease.       FAMILY HX:  History reviewed. No pertinent family history.    SOCIAL HX:  Social History     Social History   • Marital status:      Spouse name: N/A   • Number of children: N/A   • Years of education: N/A     Occupational History   • Not on file.     Social History Main Topics   • Smoking status: Former Smoker     Packs/day: 2.00     Years: 55.00     Types: Cigarettes     Quit date: 7/1/2013   • Smokeless tobacco: Never Used   • Alcohol use No   • Drug use: No   • Sexual activity: Not on file     Other Topics Concern   • Not on file     Social History Narrative   • No narrative on file     History   Smoking Status   • Former Smoker   • Packs/day: 2.00   • Years: 55.00   • Types: Cigarettes   • Quit date: 7/1/2013   Smokeless Tobacco   • Never Used     History   Alcohol Use No       Allergies/Intolerances:  Allergies   Allergen Reactions   • Other Misc Rash     Cat Hair       History reviewed with the patient    Other Current Medications:    Current Facility-Administered Medications:   •  Respiratory Care per Protocol, , Nebulization, Continuous RT, Jose Kriss, A.P.R.N.  •  ampicillin/sulbactam (UNASYN) 3 g in  mL IVPB, 3 g, Intravenous, Q6HRS, Jose Kriss, A.P.R.N.  •  linezolid (ZYVOX) tablet 600 mg, 600 mg, Oral, Q12HRS, Jose Kriss, A.P.R.N.  •  insulin regular (HUMULIN R) injection 2-12 Units, 2-12 Units, Subcutaneous, 4X/DAY ACHS, 4 Units at 06/26/19 1513 **AND** Accu-Chek ACHS, , , Q AC AND BEDTIME(S) **AND** NOTIFY MD and PharmD, , , Once **AND** glucose 4 g chewable tablet 16 g, 16 g, Oral, Q15 MIN PRN **AND** DEXTROSE 10% BOLUS 250 mL, 250 mL,  Intravenous, Q15 MIN PRN, Errol Sotomayor M.D.  •  senna-docusate (PERICOLACE or SENOKOT S) 8.6-50 MG per tablet 2 Tab, 2 Tab, Oral, BID, 2 Tab at 19 0555 **AND** polyethylene glycol/lytes (MIRALAX) PACKET 1 Packet, 1 Packet, Oral, QDAY PRN **AND** magnesium hydroxide (MILK OF MAGNESIA) suspension 30 mL, 30 mL, Oral, QDAY PRN **AND** bisacodyl (DULCOLAX) suppository 10 mg, 10 mg, Rectal, QDAY PRN, Andi Winters M.D.  •  heparin injection 5,000 Units, 5,000 Units, Subcutaneous, Q8HRS, Andi Winters M.D., 5,000 Units at 19 0556  •  acetaminophen (TYLENOL) tablet 650 mg, 650 mg, Oral, Q6HRS PRN, Andi Winters M.D.  •  albuterol (PROVENTIL) 2.5mg/0.5ml nebulizer solution 2.5 mg, 2.5 mg, Nebulization, Q4HRS PRN, Andi Winters M.D.  •  aspirin (ASA) chewable tab 81 mg, 81 mg, Oral, DAILY, Andi Winters M.D., 81 mg at 19 0555  •  atorvastatin (LIPITOR) tablet 20 mg, 20 mg, Oral, DAILY, Andi Winters M.D., 20 mg at 19 0555  •  carvedilol (COREG) tablet 3.125 mg, 3.125 mg, Oral, BID WITH MEALS, Andi Winters M.D., 3.125 mg at 19 0847  •  famotidine (PEPCID) tablet 20 mg, 20 mg, Oral, DAILY, Andi Winters M.D., 20 mg at 19 0555  •  potassium chloride SA (Kdur) tablet 10 mEq, 10 mEq, Oral, DAILY, Andi Winters M.D., 10 mEq at 19 0555  •  furosemide (LASIX) injection 20 mg, 20 mg, Intravenous, BID DIURETIC, Andi Winters M.D., 20 mg at 19 0604  [unfilled]    Most Recent Vital Signs:  /61   Pulse 69   Temp 36.6 °C (97.9 °F) (Temporal)   Resp 16   Ht 1.829 m (6')   Wt 111.2 kg (245 lb 2.4 oz)   SpO2 96%   BMI 33.25 kg/m²   Temp  Av.5 °C (97.7 °F)  Min: 36.2 °C (97.2 °F)  Max: 36.7 °C (98 °F)    Physical Exam:  General: Nontoxic, no acute distress  HEENT: sclera anicteric, PERRL, EOMI, MMM, no oral lesions  Neck: supple, no lymphadenopathy  Chest: CTAB, no r/r/w,  "normal work of breathing.  Cardiac: Regular, no murmurs no gallops heard  Abdomen: + bowel sounds, soft, non-tender, non-distended, no HSM  Extremities: No edema. No joint swelling.  Skin: no rashes or erythema  Neuro: Alert and oriented times 3, non-focal exam    Pertinent Lab Results:  Recent Labs      06/25/19   1237   WBC  7.7      Recent Labs      06/25/19   1237   HEMOGLOBIN  10.9*   HEMATOCRIT  35.5*   MCV  100.3*   MCH  30.8   PLATELETCT  191         Recent Labs      06/25/19   1237   SODIUM  141   POTASSIUM  4.9   CHLORIDE  107   CO2  27   CREATININE  1.37        Recent Labs      06/25/19   1237   ALBUMIN  3.2        Pertinent Micro:  Results     Procedure Component Value Units Date/Time    Blood Culture [[id number]] Collected:  06/25/19 1342    Order Status:  Completed Specimen:  Blood from Peripheral Updated:  06/26/19 0853     Significant Indicator NEG     Source BLD     Site PERIPHERAL     Culture Result No Growth  Note: Blood cultures are incubated for 5 days and  are monitored continuously.Positive blood cultures  are called to the RN and reported as soon as  they are identified.      Narrative:       Per Hospital Policy: Only change Specimen Src: to \"Line\" if  specified by physician order.  Right Hand    BLOOD CULTURE [[id number]] Collected:  06/25/19 1237    Order Status:  Completed Specimen:  Blood from Peripheral Updated:  06/26/19 0853     Significant Indicator NEG     Source BLD     Site PERIPHERAL     Culture Result No Growth  Note: Blood cultures are incubated for 5 days and  are monitored continuously.Positive blood cultures  are called to the RN and reported as soon as  they are identified.      Narrative:       Per Hospital Policy: Only change Specimen Src: to \"Line\" if  specified by physician order.  No site indicated    CULTURE WOUND W/ GRAM STAIN [[id number]]     Order Status:  Sent Specimen:  Wound from Left Foot         No results found for: BLOODCULTU, BLDCULT, BCHOLD "     Studies:    IMPRESSION:     //Diabetic foot ulcer  //NIDDM  //Bilateral dependent edema  //CAD  //Hx of CVA and CEA    - recommend stopping vancomycin and switching to Doxycycline  - obtain wound culture  - LPS consultation  - ESR in the AM  - lasix for edema. Venous duplex ordered and pending  - Arterial duplex bilateral  - SSI for DM. A1C pending.         Discussed with IM. Will continue to follow    David Villarreal M.D.     Please note that this dictation was created using voice recognition software. I have worked with technical experts from [facility] to optimize the interface.  I have made every reasonable attempt to correct obvious errors, but there may be errors of grammar and possibly content that I did not discover before finalizing the note.

## 2019-06-26 NOTE — ASSESSMENT & PLAN NOTE
History of chronic diabetic wounds to bilateral feet.  Has been following with outpatient wound clinic  Recent worsening of wounds with purulent drainage.   He found to have possible right foot osteomyelitis on x-ray.  S/p metatarsal head resection of right 5th and left 2nd and 3rd.    OR culture of right fifth metatarsal +E faecalis  ID following.  Will need 6 weeks IV antibiotics   Continue Unasyn with a stop date 8/9/2019  LPS following.  Continue tight glycemic control.  Pending LTACH for antibiotics and wound care.

## 2019-06-26 NOTE — PROGRESS NOTES
LIMB PRESERVATION SERVICE CONSULT      DATE OF CONSULTATION: 6/26/2019    REASON FOR CONSULT: bilateral plantar foot ulcers     HISTORY OF PRESENT ILLNESS: David Figueroa is a 79 y.o. male, with a past medical history that includes type 2 diabetes, falls, COPD, wears O2 at night, venous stasis admitted 6/25/2019 for Bilateral lower extremity edema.     [facility] has been consulted for L 2nd MTH ulcer and R 5th MTH ulcer.     reports 7 years ago stepped on tack to L foot and since then has had continuous problems to L foot..   He has followed with [facility] for last 3 years for foot and lower leg ulcers that would heal and reopen. has had several different wound care modalities including 40 wks of hyperbarics.   He even recalls a fragment of bone being removed during a wound visit to L foot ulcer.  Pt was recently discharged from clinic about 2-3 wks ago once ulcers resolved. He was fit with diabetic shoes but has yet to wear them. He was also measured for compression stockings but has not received them yet.   He is unaware that the ulcers to feet have reopened.  Presented to ED with increased erythema and edema to lower legs over last 3 wks.       IV antibiotics were started on this admission.  Infectious diseases has been consulted.    Xray completed and shows possible OM to R 5th MTH. L foot negative  MRI has not been completed   ortho has been notified but deferred to [facility] first for further assessment    He was diagnosed with type 2 diabetes in 2000, and is currently managing with orals.  He checks his blood sugars almost daily and reports that these typically average <200.  He has had previous diabetes education.  denies numbness in his feet.  He usually wears shoes. Recently fitted for diabetic shoes and inserts through [facility] but has yet to wear them. He does not check his feet routinely.  He has had previous foot ulcers to L foot. Retired      Smoking:   reports that he quit smoking  about 5 years ago. His smoking use included Cigarettes. He has a 110.00 pack-year smoking history. He has never used smokeless tobacco.    Alcohol:   reports that he does not drink alcohol.    Drug:   reports that he does not use drugs.      PAST MEDICAL HISTORY:   Past Medical History:   Diagnosis Date   • Asthma    • Breath shortness     O2  3 l/m at night   • Cold 1/2015   • Dental disorder     upper lower   • Diabetes     oral meds   • Emphysema     O2 3 liters prn   • Hiatus hernia syndrome    • High cholesterol    • Hyperlipidemia    • Hypertension    • Other emphysema (HCC)    • Paroxysmal atrial fibrillation (HCC)    • Pneumonia 2013   • Snoring    • Stroke (HCC) 1999   • Weakness     since pneumonia        PAST SURGICAL HISTORY:   Past Surgical History:   Procedure Laterality Date   • RECOVERY  3/4/2015    Performed by Cath-Recovery Surgery at SURGERY SAME DAY [facility] ORS   • CAROTID ENDARTERECTOMY  9/17/2014    Performed by Willy Solares M.D. at SURGERY [facility] ORS   • ZZZ CARDIAC CATH      inoperable disease.       MEDICATIONS:   Current Facility-Administered Medications   Medication Dose   • Respiratory Care per Protocol     • ampicillin/sulbactam (UNASYN) 3 g in  mL IVPB  3 g   • insulin regular (HUMULIN R) injection 2-12 Units  2-12 Units    And   • glucose 4 g chewable tablet 16 g  16 g    And   • DEXTROSE 10% BOLUS 250 mL  250 mL   • linezolid (ZYVOX) tablet 600 mg  600 mg   • senna-docusate (PERICOLACE or SENOKOT S) 8.6-50 MG per tablet 2 Tab  2 Tab    And   • polyethylene glycol/lytes (MIRALAX) PACKET 1 Packet  1 Packet    And   • magnesium hydroxide (MILK OF MAGNESIA) suspension 30 mL  30 mL    And   • bisacodyl (DULCOLAX) suppository 10 mg  10 mg   • heparin injection 5,000 Units  5,000 Units   • acetaminophen (TYLENOL) tablet 650 mg  650 mg   • albuterol (PROVENTIL) 2.5mg/0.5ml nebulizer solution 2.5 mg  2.5 mg   • aspirin (ASA) chewable tab 81 mg  81 mg   • atorvastatin (LIPITOR)  tablet 20 mg  20 mg   • carvedilol (COREG) tablet 3.125 mg  3.125 mg   • famotidine (PEPCID) tablet 20 mg  20 mg   • potassium chloride SA (Kdur) tablet 10 mEq  10 mEq   • furosemide (LASIX) injection 20 mg  20 mg       ALLERGIES:    Allergies   Allergen Reactions   • Other Misc Rash     Cat Hair        FAMILY HISTORY: History reviewed. No pertinent family history.      REVIEW OF SYSTEMS:   Constitutional: Negative for chills, fever   Respiratory: Negative for cough and shortness of breath.    Cardiovascular:Negative for chest pain, and claudication.   Gastrointestinal: Negative for constipation, diarrhea, nausea and vomiting.   Lower extremities: swelling and redness  Neurological: numbness in feet and lower legs    DIAGNOSTIC DATA:   Lab Results   Component Value Date/Time    HBA1C 10.3 (H) 06/26/2019 01:13 AM        Recent Labs      06/25/19   1237   WBC  7.7   RBC  3.54*   HEMOGLOBIN  10.9*   HEMATOCRIT  35.5*   MCV  100.3*   MCH  30.8   MCHC  30.7*   RDW  52.8*   PLATELETCT  191   MPV  11.0     Recent Labs      06/25/19   1237   SODIUM  141   POTASSIUM  4.9   CHLORIDE  107   CO2  27   GLUCOSE  352*   BUN  29*     precalcitonin <0.05    ESR:   none    CRP:   none       X-ray: L foot:   1.  No definitive evidence for osteomyelitis.    2.  Forefoot deformities as described.    R foot:   1.  Lucency in the right fifth toe, possibly representing osteomyelitis. If clinically indicated, MRI could be performed for confirmation.    2.  Soft tissue edema within the forefoot.      MRI:   none    Arterial studies: pending    Venous studies pending    Microbiology:  Results     Procedure Component Value Units Date/Time    Blood Culture [[id number]] Collected:  06/25/19 1342    Order Status:  Completed Specimen:  Blood from Peripheral Updated:  06/26/19 0853     Significant Indicator NEG     Source BLD     Site PERIPHERAL     Culture Result No Growth  Note: Blood cultures are incubated for 5 days and  are monitored  "continuously.Positive blood cultures  are called to the RN and reported as soon as  they are identified.      Narrative:       Per Hospital Policy: Only change Specimen Src: to \"Line\" if  specified by physician order.  Right Hand    BLOOD CULTURE [[id number]] Collected:  06/25/19 1237    Order Status:  Completed Specimen:  Blood from Peripheral Updated:  06/26/19 0853     Significant Indicator NEG     Source BLD     Site PERIPHERAL     Culture Result No Growth  Note: Blood cultures are incubated for 5 days and  are monitored continuously.Positive blood cultures  are called to the RN and reported as soon as  they are identified.      Narrative:       Per Hospital Policy: Only change Specimen Src: to \"Line\" if  specified by physician order.  No site indicated    CULTURE WOUND W/ GRAM STAIN [[id number]]     Order Status:  Sent Specimen:  Wound from Left Foot            PHYSICAL EXAMINATION:   VITAL SIGNS: /61   Pulse 69   Temp 36.6 °C (97.9 °F) (Temporal)   Resp 16   Ht 1.829 m (6')   Wt 111.2 kg (245 lb 2.4 oz)   SpO2 96%   BMI 33.25 kg/m²          General Appearance:  Well developed, well nourished, in no acute distress  Obese    Lower Extremity Assessment:    Edema:   BLE +4 pitting    Pulses:  R foot: palpable PT and DP. With doppler multiphasic  L foot: palpable PT, unable to palpate DP. With doppler multiphasic    ROM dorsi/plantarflexion limited to BLE  Structural /mechanical: hyperextended L great toe  Hammer toes 2-5 bilaterally  Maceration in between hammer toes.   Ulcer to L 2nd distal tip    Sees podiatry in [place] for foot and nail care       Sensory Assessment  Feet Insensate to light touch, sensation to heel ankle area      Wound Assessment:    R 5th MTH ulcer:   Thick periwound callus  Slough yellow wet loose  Erythema: none   Drainage: minimal serous/ purulent      L 2nd/ 3rd MTH ulcer:    Thick periwound callus  Slough yellow wet loose  Erythema: none   Drainage: minimal yellow " exudate      PROCEDURE:   Using scalpel, scissors, curette, forceps excised non viable tissue, callus to both R 5th MTH ulcer and L 2nd MTH ulcer until bleeding tissue present.   Total area debrided from R 5th MTh ulcer:20.8cm2 to muscle layer.  Bone exposed. Total callus debridement 15cm2  Total area of L 2nd MTH ulcer debrided 3cm2 To muscle tissue layer. Bone probed. Total callus to skin level: 4cm2  Bleeding controlled with manual pressure   Wounds cleaned with NS, applied non ad foam, hypafix tape        post debridement measurements:   See photo  L 2nd MTH ulcer: 1.2 x 2.5 x 0.8cm   R 5th MTH ulcer: 4 x 5.2 x bone cm         L 2nd toe distal tip: dried intact scab  Measures ~0.8 x 0.8cm    Scattered scabs with hemosiderin staining to bilateral lower legs. Slight erythema and warmth    Abrasion to R knee from recent fall  ~6 x 6cm          ASSESSMENT AND PLAN:     R 5th MTH ulcer with bone exposure and slight purulence. L 2nd MTH ulcer with bone palpation.   Excisional debridement at bedside    Wound care: non ad foam with hypafix tape to foot ulcers.   Recommend compression therapy once RACHEL are completed    R Knee abrasion: ad foam    Surgery:   NPO midnight.  Recommend R 5th MTH excision with R GSR and L foot I & D with percutaneous tenotomies 2-5.       Offloading: offloading boot RLE. L foot footwear will depend on surgical outcomes.  Uses FWW, recent fall hx. Recommend PT/OT    Weight bearing:    Bilateral HWB    Infection: ID involved.     Vascular: palpable pulses R foot. Multiphasic tones with doppler to pedal pulses bilaterally. Arterial studies pending    Diabetes: consult CDE and RD, a1c 10.3%. Discussed maintaining blood sugars <150 for improved wound healing.     Neuropathy:  Implications of loss of protective sensation (LOPS) discussed with patient- including increased risk for amputation.  Advised to check feet at least daily, moisturize feet, and to always wear protective foot wear.     D/W: pt,  RN, IM, Dr. Cooper    Discharge Plan:  Lives in [facility]    Requires continued wound care therapy

## 2019-06-26 NOTE — THERAPY
PT orders received and acknowledged. Pt awaiting LPS/wound care consults. Plan to hold PT eval until POC established.

## 2019-06-26 NOTE — ASSESSMENT & PLAN NOTE
History of chronic kidney disease.  Currently renal functions are at the baseline.  Avoid nephrotoxins.  Continue to monitor  Kidney function remained stable.

## 2019-06-26 NOTE — ASSESSMENT & PLAN NOTE
A1c 10.3  BG better controlled on current regimen  Continue lantus and SSI.  Diabetic diet and accuchecks.

## 2019-06-26 NOTE — THERAPY
OT orders received. Pt awaiting wound care consult and limb preservation consult. Will hold until POC is established.     Clarissa Raymond, OTR/L  Pager: [phone]

## 2019-06-26 NOTE — PROGRESS NOTES
2 RN Skin Check with Chen (Charge):  Pt arrived to unit at 2345 on 6/26/19. Skin tear on right elbow and right knee was scabbed and KEANU. RUE was edematous. BLE are red and excoriated. Diabetic ulcers found on both feet at the soles. Small drainage noted. Ulcers cleansed and dressed. Hernia protruding from abdomen with skin CDI. Generalize redness throughout skin. Will continue to monitor patient.

## 2019-06-26 NOTE — ASSESSMENT & PLAN NOTE
He found to have mildly elevated troponin.  He denies any symptoms of chest pain.  EKG stable  Stress induced secondary to volume overload  Echo shows EF 35%.  Continue diuretics, changed to torsemide by cardiologist.

## 2019-06-26 NOTE — PROGRESS NOTES
Pharmacy Kinetics 79 y.o. male on vancomycin day # 2 2019    Currently on Vancomycin 2300 mg iv q24hr    Indication for Treatment: Diabetic foot wound with possible OM    Pertinent history per medical record: Admitted on 2019 for bilateral lower extremity swelling and concern for osteomyelitis. Patient reportedly followed by wound care for ~2.5 years for bilateral foot wounds, is currently on oral cephalexin. Empiric antibiotics initiated for treatment of his wounds. LPS to be consulted.    Other antibiotics: Zosyn 3.375 q8hr    Allergies: Other misc     List concerns for renal function: BMI 33, baseline CKD, DM    Pertinent cultures to date:    Blood cx - in process   Wound cx - to be drawn    MRSA nares swab if pneumonia is a concern (ordered/positive/negative/n-a): N/A    Recent Labs      19   1237   WBC  7.7   NEUTSPOLYS  66.40     Recent Labs      19   1237   BUN  29*   CREATININE  1.37   ALBUMIN  3.2     No results for input(s): VANCOTROUGH, VANCOPEAK, VANCORANDOM in the last 72 hours.  Intake/Output Summary (Last 24 hours) at 19 0842  Last data filed at 19 0355   Gross per 24 hour   Intake                0 ml   Output              200 ml   Net             -200 ml      /68   Pulse 77   Temp 36.2 °C (97.2 °F) (Temporal)   Resp 16   Ht 1.829 m (6')   Wt 111.2 kg (245 lb 2.4 oz)   SpO2 95%  Temp (24hrs), Av.5 °C (97.7 °F), Min:36.2 °C (97.2 °F), Max:36.7 °C (98 °F)      A/P   1. Vancomycin dose change: yes, begin 2000mg q24hr at 1200  2. Next vancomycin level: prior to 4th dose;  at 1100; ordered  3. Goal trough: 12-16 mcg/mL  4. Comments: LPS still to be consulted for diabetic foot wounds, with noted purulence.  Wound cultures still to be obtained.  Further imaging beyond xray may be necessary.  Moderate concern for renal dysfunction d/t reasons noted above.  Will reduce dose to 2000mg daily per protocol for maximum maintenance dosing.  Assess trough  in ~2 days.    Lázaro Nguyen, PharmD, BCPS

## 2019-06-26 NOTE — PROGRESS NOTES
2 RN skin check complete with Karmen RAZA.   Devices in place: nasal canula, piv.  Skin assessed under devices intact.  Confirmed pressure ulcers found on bilateral feet.  New potential pressure ulcers noted on n/a.  The following interventions in place: Heels floated on pillows, pt educated to turn in bed often.     Open wound to bilateral feet sole. Dressing in place to wounds. Scab to right knee, dark and red. Redness to bilateral elbows, blanching. Scab to left elbow. Redness and discoloration to bilateral lower extremities, blanching. Redness with blanching to sacrum.

## 2019-06-26 NOTE — ASSESSMENT & PLAN NOTE
Secondary to chronic edema  Continue diuretics, changed to torsemide by cardiologist.  Skin care.

## 2019-06-27 ENCOUNTER — APPOINTMENT (OUTPATIENT)
Dept: CARDIOLOGY | Facility: MEDICAL CENTER | Age: 79
DRG: 622 | End: 2019-06-27
Attending: NURSE PRACTITIONER
Payer: MEDICARE

## 2019-06-27 ENCOUNTER — APPOINTMENT (OUTPATIENT)
Dept: RADIOLOGY | Facility: MEDICAL CENTER | Age: 79
DRG: 622 | End: 2019-06-27
Attending: INTERNAL MEDICINE
Payer: MEDICARE

## 2019-06-27 PROBLEM — I50.9 CHF (CONGESTIVE HEART FAILURE) (HCC): Status: ACTIVE | Noted: 2019-06-27

## 2019-06-27 PROBLEM — I42.9 CARDIOMYOPATHY (HCC): Status: ACTIVE | Noted: 2019-06-27

## 2019-06-27 PROBLEM — I50.21 ACUTE SYSTOLIC CHF (CONGESTIVE HEART FAILURE) (HCC): Status: ACTIVE | Noted: 2019-06-27

## 2019-06-27 LAB
ANION GAP SERPL CALC-SCNC: 10 MMOL/L (ref 0–11.9)
BASOPHILS # BLD AUTO: 0.5 % (ref 0–1.8)
BASOPHILS # BLD: 0.03 K/UL (ref 0–0.12)
BNP SERPL-MCNC: 811 PG/ML (ref 0–100)
BUN SERPL-MCNC: 27 MG/DL (ref 8–22)
CALCIUM SERPL-MCNC: 9.2 MG/DL (ref 8.5–10.5)
CHLORIDE SERPL-SCNC: 102 MMOL/L (ref 96–112)
CO2 SERPL-SCNC: 29 MMOL/L (ref 20–33)
CREAT SERPL-MCNC: 1.46 MG/DL (ref 0.5–1.4)
EOSINOPHIL # BLD AUTO: 0.22 K/UL (ref 0–0.51)
EOSINOPHIL NFR BLD: 3.6 % (ref 0–6.9)
ERYTHROCYTE [DISTWIDTH] IN BLOOD BY AUTOMATED COUNT: 52.3 FL (ref 35.9–50)
GLUCOSE BLD-MCNC: 127 MG/DL (ref 65–99)
GLUCOSE BLD-MCNC: 155 MG/DL (ref 65–99)
GLUCOSE BLD-MCNC: 79 MG/DL (ref 65–99)
GLUCOSE BLD-MCNC: 82 MG/DL (ref 65–99)
GLUCOSE SERPL-MCNC: 110 MG/DL (ref 65–99)
HCT VFR BLD AUTO: 37.3 % (ref 42–52)
HGB BLD-MCNC: 11.2 G/DL (ref 14–18)
IMM GRANULOCYTES # BLD AUTO: 0.02 K/UL (ref 0–0.11)
IMM GRANULOCYTES NFR BLD AUTO: 0.3 % (ref 0–0.9)
LV EJECT FRACT  99904: 35
LV EJECT FRACT MOD 4C 99902: 32.14
LYMPHOCYTES # BLD AUTO: 1.1 K/UL (ref 1–4.8)
LYMPHOCYTES NFR BLD: 18 % (ref 22–41)
MCH RBC QN AUTO: 30.3 PG (ref 27–33)
MCHC RBC AUTO-ENTMCNC: 30 G/DL (ref 33.7–35.3)
MCV RBC AUTO: 100.8 FL (ref 81.4–97.8)
MONOCYTES # BLD AUTO: 0.57 K/UL (ref 0–0.85)
MONOCYTES NFR BLD AUTO: 9.3 % (ref 0–13.4)
NEUTROPHILS # BLD AUTO: 4.16 K/UL (ref 1.82–7.42)
NEUTROPHILS NFR BLD: 68.3 % (ref 44–72)
NRBC # BLD AUTO: 0 K/UL
NRBC BLD-RTO: 0 /100 WBC
PLATELET # BLD AUTO: 211 K/UL (ref 164–446)
PMV BLD AUTO: 10.2 FL (ref 9–12.9)
POTASSIUM SERPL-SCNC: 4.6 MMOL/L (ref 3.6–5.5)
RBC # BLD AUTO: 3.7 M/UL (ref 4.7–6.1)
SODIUM SERPL-SCNC: 141 MMOL/L (ref 135–145)
TROPONIN I SERPL-MCNC: 0.12 NG/ML (ref 0–0.04)
WBC # BLD AUTO: 6.1 K/UL (ref 4.8–10.8)

## 2019-06-27 PROCEDURE — 700105 HCHG RX REV CODE 258

## 2019-06-27 PROCEDURE — 700117 HCHG RX CONTRAST REV CODE 255: Performed by: NURSE PRACTITIONER

## 2019-06-27 PROCEDURE — 700102 HCHG RX REV CODE 250 W/ 637 OVERRIDE(OP): Performed by: NURSE PRACTITIONER

## 2019-06-27 PROCEDURE — 700105 HCHG RX REV CODE 258: Performed by: NURSE PRACTITIONER

## 2019-06-27 PROCEDURE — 93306 TTE W/DOPPLER COMPLETE: CPT | Mod: 26 | Performed by: INTERNAL MEDICINE

## 2019-06-27 PROCEDURE — 99233 SBSQ HOSP IP/OBS HIGH 50: CPT | Mod: GC | Performed by: INTERNAL MEDICINE

## 2019-06-27 PROCEDURE — 700111 HCHG RX REV CODE 636 W/ 250 OVERRIDE (IP): Performed by: NURSE PRACTITIONER

## 2019-06-27 PROCEDURE — 36415 COLL VENOUS BLD VENIPUNCTURE: CPT

## 2019-06-27 PROCEDURE — 700105 HCHG RX REV CODE 258: Performed by: INTERNAL MEDICINE

## 2019-06-27 PROCEDURE — 82962 GLUCOSE BLOOD TEST: CPT | Mod: 91

## 2019-06-27 PROCEDURE — 93922 UPR/L XTREMITY ART 2 LEVELS: CPT

## 2019-06-27 PROCEDURE — 93970 EXTREMITY STUDY: CPT

## 2019-06-27 PROCEDURE — 93922 UPR/L XTREMITY ART 2 LEVELS: CPT | Mod: 26,GZ | Performed by: INTERNAL MEDICINE

## 2019-06-27 PROCEDURE — 83880 ASSAY OF NATRIURETIC PEPTIDE: CPT

## 2019-06-27 PROCEDURE — 770020 HCHG ROOM/CARE - TELE (206)

## 2019-06-27 PROCEDURE — A9270 NON-COVERED ITEM OR SERVICE: HCPCS | Performed by: INTERNAL MEDICINE

## 2019-06-27 PROCEDURE — 99233 SBSQ HOSP IP/OBS HIGH 50: CPT | Performed by: FAMILY MEDICINE

## 2019-06-27 PROCEDURE — 84484 ASSAY OF TROPONIN QUANT: CPT

## 2019-06-27 PROCEDURE — 700111 HCHG RX REV CODE 636 W/ 250 OVERRIDE (IP): Performed by: INTERNAL MEDICINE

## 2019-06-27 PROCEDURE — 93970 EXTREMITY STUDY: CPT | Mod: 26 | Performed by: INTERNAL MEDICINE

## 2019-06-27 PROCEDURE — A9270 NON-COVERED ITEM OR SERVICE: HCPCS | Performed by: NURSE PRACTITIONER

## 2019-06-27 PROCEDURE — 700102 HCHG RX REV CODE 250 W/ 637 OVERRIDE(OP): Performed by: INTERNAL MEDICINE

## 2019-06-27 PROCEDURE — 80048 BASIC METABOLIC PNL TOTAL CA: CPT

## 2019-06-27 PROCEDURE — 85025 COMPLETE CBC W/AUTO DIFF WBC: CPT

## 2019-06-27 PROCEDURE — 93306 TTE W/DOPPLER COMPLETE: CPT

## 2019-06-27 RX ORDER — SODIUM CHLORIDE 9 MG/ML
INJECTION, SOLUTION INTRAVENOUS
Status: COMPLETED
Start: 2019-06-27 | End: 2019-06-27

## 2019-06-27 RX ADMIN — CARVEDILOL 3.12 MG: 3.12 TABLET, FILM COATED ORAL at 17:39

## 2019-06-27 RX ADMIN — FUROSEMIDE 20 MG: 10 INJECTION, SOLUTION INTRAVENOUS at 15:27

## 2019-06-27 RX ADMIN — INSULIN HUMAN 2 UNITS: 100 INJECTION, SOLUTION PARENTERAL at 21:32

## 2019-06-27 RX ADMIN — LINEZOLID 600 MG: 600 TABLET, FILM COATED ORAL at 17:38

## 2019-06-27 RX ADMIN — AMPICILLIN SODIUM AND SULBACTAM SODIUM 3 G: 2; 1 INJECTION, POWDER, FOR SOLUTION INTRAMUSCULAR; INTRAVENOUS at 03:35

## 2019-06-27 RX ADMIN — LINEZOLID 600 MG: 600 TABLET, FILM COATED ORAL at 05:50

## 2019-06-27 RX ADMIN — SENNOSIDES, DOCUSATE SODIUM 2 TABLET: 50; 8.6 TABLET, FILM COATED ORAL at 05:50

## 2019-06-27 RX ADMIN — ASPIRIN 81 MG 81 MG: 81 TABLET ORAL at 05:50

## 2019-06-27 RX ADMIN — SODIUM CHLORIDE: 9 INJECTION, SOLUTION INTRAVENOUS at 15:30

## 2019-06-27 RX ADMIN — AMPICILLIN SODIUM AND SULBACTAM SODIUM 3 G: 2; 1 INJECTION, POWDER, FOR SOLUTION INTRAMUSCULAR; INTRAVENOUS at 15:26

## 2019-06-27 RX ADMIN — POTASSIUM CHLORIDE 10 MEQ: 1500 TABLET, EXTENDED RELEASE ORAL at 05:51

## 2019-06-27 RX ADMIN — ATORVASTATIN CALCIUM 20 MG: 20 TABLET, FILM COATED ORAL at 05:50

## 2019-06-27 RX ADMIN — HEPARIN SODIUM 5000 UNITS: 5000 INJECTION, SOLUTION INTRAVENOUS; SUBCUTANEOUS at 05:51

## 2019-06-27 RX ADMIN — CARVEDILOL 3.12 MG: 3.12 TABLET, FILM COATED ORAL at 08:44

## 2019-06-27 RX ADMIN — SENNOSIDES, DOCUSATE SODIUM 2 TABLET: 50; 8.6 TABLET, FILM COATED ORAL at 17:38

## 2019-06-27 RX ADMIN — FUROSEMIDE 20 MG: 10 INJECTION, SOLUTION INTRAVENOUS at 05:51

## 2019-06-27 RX ADMIN — HUMAN ALBUMIN MICROSPHERES AND PERFLUTREN 3 ML: 10; .22 INJECTION, SOLUTION INTRAVENOUS at 10:15

## 2019-06-27 RX ADMIN — AMPICILLIN SODIUM AND SULBACTAM SODIUM 3 G: 2; 1 INJECTION, POWDER, FOR SOLUTION INTRAMUSCULAR; INTRAVENOUS at 08:45

## 2019-06-27 RX ADMIN — HEPARIN SODIUM 5000 UNITS: 5000 INJECTION, SOLUTION INTRAVENOUS; SUBCUTANEOUS at 14:24

## 2019-06-27 RX ADMIN — FAMOTIDINE 20 MG: 20 TABLET ORAL at 05:50

## 2019-06-27 RX ADMIN — CEFEPIME 1 G: 1 INJECTION, POWDER, FOR SOLUTION INTRAMUSCULAR; INTRAVENOUS at 18:28

## 2019-06-27 RX ADMIN — HEPARIN SODIUM 5000 UNITS: 5000 INJECTION, SOLUTION INTRAVENOUS; SUBCUTANEOUS at 21:32

## 2019-06-27 ASSESSMENT — ENCOUNTER SYMPTOMS
HEADACHES: 0
DIARRHEA: 0
TINGLING: 0
MYALGIAS: 0
SPEECH CHANGE: 0
FEVER: 0
DIZZINESS: 0
SPUTUM PRODUCTION: 0
CLAUDICATION: 0
SENSORY CHANGE: 0
WHEEZING: 1
WEAKNESS: 0
VOMITING: 0
BLURRED VISION: 0
CHILLS: 0
TREMORS: 0
WEIGHT LOSS: 0
ABDOMINAL PAIN: 0
COUGH: 0
SHORTNESS OF BREATH: 0
PALPITATIONS: 0
NAUSEA: 0

## 2019-06-27 ASSESSMENT — LIFESTYLE VARIABLES: ALCOHOL_USE: NO

## 2019-06-27 NOTE — PROGRESS NOTES
LIMB PRESERVATION SERVICE     79 y.o. male, with a past medical history that includes type 2 diabetes, falls, COPD, wears O2 at night, venous stasis admitted 6/25/2019 for Bilateral lower extremity edema.     LPS has been consulted for L 2nd MTH ulcer and R 5th MTH ulcer.    Pt was recently discharged from [facility] wound clinic about 2-3 wks ago once ulcers resolved. He was fit with diabetic shoes but has yet to wear them. He was also measured for compression stockings but has not received them yet.   He is unaware that the ulcers to feet have reopened.      NPO    Arterial studies:   Right.    There is no evidence of arterial disease demonstrated on the right side.    Toe brachial index is normal; 0.83.   Doppler waveforms of the common femoral, popliteal, and dorsalis pedis    arteries are of high amplitude and biphasic.    Doppler waveform of the posterior tibial artery is triphasic.     Left.    Evidence of calcified arteries on the left side make assessment unreliable.    Toe brachial index shows evidence of calcified arteries; 1.18.   Doppler waveforms of the common femoral, popliteal, and posterior tibial    arteries are of high amplitude and biphasic.    Doppler waveform of the dorsalis pedis artery is triphasic.        PLAN  Diabetic diet  NPO midnight for R GSR, R 5th MTH excision, L 2nd MTH excision with Dr. Cooper  R foot diabetic boot ordered  L foot footwear to be determined post op    D/W: RN, Dr. Cooper, [facility] Hospitalist BILLY

## 2019-06-27 NOTE — CARE PLAN
Problem: Communication  Goal: The ability to communicate needs accurately and effectively will improve  Outcome: PROGRESSING AS EXPECTED  Educated patient to call RN for any needs. Patient verbalized understanding. Hourly rounding in place.     Problem: Skin Integrity  Goal: Risk for impaired skin integrity will decrease  Outcome: PROGRESSING AS EXPECTED  Dressing changes are in place for BLE q48 hours. Dressings are clean, dry and intact.

## 2019-06-27 NOTE — PROGRESS NOTES
Infectious Disease Progress note    After 5PM or if no immediate response to page, please call for cross-coverage  Attending/Team: Dr. Mike Call [phone] to page   1st Call - Day Intern (R1):    2nd Call - Day Sr. Resident (R2/R3):   Dr. Villarreal    Name David Figueroa     1940   Age/Sex 79 y.o. male   MRN [id number]   Code Status Full        Assessment/Plan       //Diabetic foot ulcer  //Uncontrolled type 2 diabetes  //Bilateral dependent edema - improving  //CAD  //Hx of CVA and CEA  //Congestive heart failure  //CKD III    - R GSR, R 5th MTH excision, L 2nd MTH excision with Dr. Cooper  - Stop Unasyn and start cefepime. Continue zyvox.  - obtain wound culture - pending  - lasix for edema. Venous duplex ordered and pending  - Arterial duplex bilateral negative for compromised blood flow.     Date of Service: 2019    Chief Complaint  79 y.o. year old male here with diabetic foot non healing ulcers and worsening congestive heart failure.     Interval Problem Update  - EF 35%. Plan to stop Unasyn and switch to cefepime due to salt loading. Continue Zyvox  -Edema improved with diuresis  - Planned for I+D tomorrow.   -Bilateral arterial duplex completed. No evidence of arterial compromise on the right side and unreliable assessment due to calcification on the left side.     Consultants/Specialty  Cardiology   ID    Disposition  Pending   Physical Exam    Vitals:    19 0339 19 0700 19 1434 19 1516   BP: 123/78 122/66 124/79 114/82   Pulse: 81 82 79 83   Resp: 17 18 16 18   Temp: 36.5 °C (97.7 °F) 36.1 °C (97 °F) 36.1 °C (97 °F) 36.4 °C (97.5 °F)   TempSrc: Temporal Temporal Temporal Temporal   SpO2: 96% 94% 92% 98%   Weight:       Height:         Physical Exam   Constitutional: He is oriented to person, place, and time. He appears well-developed and well-nourished. No distress.   HENT:   Head: Normocephalic and atraumatic.   Eyes: Pupils are equal, round, and reactive to  light. EOM are normal.   Neck: Normal range of motion. Neck supple.   Cardiovascular: Normal rate, regular rhythm and normal heart sounds.  Exam reveals no gallop and no friction rub.    No murmur heard.  Pulmonary/Chest: Effort normal and breath sounds normal. No respiratory distress. He has no wheezes. He has no rales.   Abdominal: Soft. Bowel sounds are normal.   Musculoskeletal: Normal range of motion. He exhibits edema, tenderness and deformity.   Neurological: He is alert and oriented to person, place, and time.   Skin: Skin is warm and dry. He is not diaphoretic.   Bilateral diabetic wounds - improved surrounding erythema    Psychiatric: His behavior is normal. Judgment normal.   Anxious affect.      Body mass index is 33.25 kg/m².   Review of Systems   Constitutional: Negative for chills, fever, malaise/fatigue and weight loss.   Respiratory: Negative for cough and shortness of breath.    Cardiovascular: Positive for leg swelling. Negative for chest pain, palpitations and claudication.   Gastrointestinal: Negative for abdominal pain, nausea and vomiting.   Genitourinary: Negative for dysuria.   Musculoskeletal: Negative for myalgias.   Neurological: Negative for sensory change, speech change and headaches.      Laboratory/Imaging    Lab Results   Component Value Date/Time    WBC 6.1 06/27/2019 02:47 PM    RBC 3.70 (L) 06/27/2019 02:47 PM    HEMOGLOBIN 11.2 (L) 06/27/2019 02:47 PM    HEMATOCRIT 37.3 (L) 06/27/2019 02:47 PM    .8 (H) 06/27/2019 02:47 PM    MCH 30.3 06/27/2019 02:47 PM    MCHC 30.0 (L) 06/27/2019 02:47 PM    MPV 10.2 06/27/2019 02:47 PM    NEUTSPOLYS 68.30 06/27/2019 02:47 PM    LYMPHOCYTES 18.00 (L) 06/27/2019 02:47 PM    MONOCYTES 9.30 06/27/2019 02:47 PM    EOSINOPHILS 3.60 06/27/2019 02:47 PM    BASOPHILS 0.50 06/27/2019 02:47 PM     Quality-Core Measures

## 2019-06-27 NOTE — PROGRESS NOTES
Pt transferred to [place] [address] bed 2 as a higher level for ECHO result of EF 35%. Pt will require cardiology clearance for surgery tomorrow. Pt VS stable. Pt placed on heart monitor, called monitor room verified pt name, heart rate and rhythm. Received report assumed care of patient.

## 2019-06-27 NOTE — PROGRESS NOTES
2 RN skin check complete Jose  Devices in place waffle mattress, Q 2 hour turns, heel float boots, mepilex, barrier cream, inter dry, silicone NC, martha cream ordered  Skin assessed under devices yes.  Confirmed pressure ulcers found on bilateral bottom of feet.  New potential pressure ulcers noted on N/A . Wound consult placed yes.  The following interventions in place waffle mattress, Q 2 hour turns, heel float boots, mepilex, barrier cream, inter dry, silicone NC, martha cream ordered    Bilateral ears are red and blanching  Bilateral elbows have scabbing, generalized bruising and red and blanching, healing skin tear om forearm and 2nd digit  Sacrum is red and blanching  Bilateral groin has moisture fissure red and blanching  Heels are red and blanching  Right bottom foot has unstagable ulcer  Left bottom foot has open diabetic ulcer  left 4th digit has peeling will continue to monitor not open at this time  Right knee has scab from PTA fall

## 2019-06-27 NOTE — ASSESSMENT & PLAN NOTE
Echo reveals EF of 35%  New diagnosis  Secondary to ischemic cardiomyopathy with known severe CAD with multivessel disease.  Was deemed a poor surgical candidate.  Further invasive cardiac procedure or surgery would not change the overall mortality of this patient per cardiology    Optimize medical management.  Continue coreg, losartan, and torsemide.  Hold off on aldactone for now as BP is soft.

## 2019-06-27 NOTE — PROGRESS NOTES
Received report and assumed patient care. Patient is AOx4. No compliants of pain at this time. Assessment complete on 3L O2. All needs met at this time. Safety precautions and hourly rounding in place.

## 2019-06-27 NOTE — PROGRESS NOTES
2 RN skin check complete Rozina RN  Devices in place:nasal cannula,piv  Skin assessed under devices NA  Confirmed pressure ulcers found on:NA  New potential pressure ulcers noted onNA  The following interventions in place educated to turn often tines,floated both heels with pillows,provided clean linens.          Open wound to bilateral sole with dressing in placed,redness to bilateral elbows blanching,scab to right knee,scab to left elbow,discoloration to ble,blanching,redness and blanching to sacrum.

## 2019-06-27 NOTE — PROGRESS NOTES
[facility] Medicine Daily Progress Note    Date of Service  6/27/2019    Chief Complaint  79 y.o. male admitted 6/25/2019 with BLE swelling and diabetic foot infection.    Hospital Course    H/O COPD, CAD, HTN, DM, HLD.  Patient follows with outpatient wound care for chronic, nonhealing bilateral diabetic foot wounds.  Presented to ED after developing worsening erythema and swelling of BLE as well as purulent drainage from foot wounds.  Started on antibiotics.  ID and LPS consulted.  Also found to have mildly elevated troponin with stable EKG.  Suspect volume overload with elevated BNP and edema noted on chest x-ray.  Continue diuresis.  Echo revealing new CHF with EF 35%.  Cardiology consulted.  Pending surgical I&D.  Will need cardiac clearance prior to surgery.          Interval Problem Update  6/26:  Bilateral foot wounds noted with purulent drainage.  Dressing intact.  Denies pain.  Improvement of BLE edema with diuresis, although noted to have stasis dermatitis.  Wheezing noted on ausculation.  Denies shortness of breath.  Pro curt negative.  Bilateral opacities noted on CXR.  Echo pending.   6/27:  Echo reveals EF 35%.  New HF diagnosis.  Cardiology consulted.  Edema overall improved with diuresis.  Troponin trending down.  No chest pain.  Pending surgical I&D.  Will need cardiac clearance prior.  Hgb A1c 10.3.  Started on SSI.     Consultants/Specialty  ID  PAULIE  Cardiology    Code Status  FULL    Disposition  Transfer to telemetry.     Review of Systems  Review of Systems   Constitutional: Negative for chills, fever and malaise/fatigue.   HENT: Negative for congestion.    Eyes: Negative for blurred vision.   Respiratory: Positive for wheezing. Negative for cough and sputum production.    Cardiovascular: Positive for leg swelling. Negative for chest pain.   Gastrointestinal: Negative for abdominal pain, diarrhea, nausea and vomiting.   Genitourinary: Negative for dysuria.   Musculoskeletal: Negative for  myalgias.   Skin: Negative for itching.        BLE swelling and erythema.  Bilateral foot wounds.    Neurological: Negative for dizziness, tingling, tremors, sensory change, speech change, weakness and headaches.        Physical Exam  Temp:  [36.1 °C (97 °F)-36.6 °C (97.9 °F)] 36.1 °C (97 °F)  Pulse:  [69-89] 82  Resp:  [16-18] 18  BP: (104-123)/(61-78) 122/66  SpO2:  [94 %-97 %] 94 %    Physical Exam   Constitutional: He is oriented to person, place, and time. He appears well-developed and well-nourished.   Obese male.    HENT:   Head: Normocephalic and atraumatic.   Right Ear: External ear normal.   Left Ear: External ear normal.   Mouth/Throat: No oropharyngeal exudate.   Eyes: Conjunctivae are normal. No scleral icterus.   Neck: Normal range of motion. No JVD present.   Cardiovascular: Normal rate.  Exam reveals no friction rub.    No murmur heard.  BLE edema   Pulmonary/Chest: Effort normal. No stridor. No respiratory distress. He has decreased breath sounds in the right lower field and the left lower field. He has wheezes in the right lower field, the left upper field, the left middle field and the left lower field. He has no rales.   Abdominal: Soft. Bowel sounds are normal. He exhibits no distension. There is no tenderness.   Musculoskeletal: Normal range of motion. He exhibits no tenderness or deformity.   Neurological: He is alert and oriented to person, place, and time.   Skin: Skin is warm and dry. He is not diaphoretic. There is erythema.   Stasis dermatitis noted to BLE   Bilateral diabetic foot wounds with purulent drainage.  Dressing intact.    Psychiatric: He has a normal mood and affect.   Nursing note and vitals reviewed.      Fluids    Intake/Output Summary (Last 24 hours) at 06/27/19 1344  Last data filed at 06/27/19 0553   Gross per 24 hour   Intake              120 ml   Output              700 ml   Net             -580 ml       Laboratory  Recent Labs      06/25/19   1237   WBC  7.7   RBC   3.54*   HEMOGLOBIN  10.9*   HEMATOCRIT  35.5*   MCV  100.3*   MCH  30.8   MCHC  30.7*   RDW  52.8*   PLATELETCT  191   MPV  11.0     Recent Labs      06/25/19   1237   SODIUM  141   POTASSIUM  4.9   CHLORIDE  107   CO2  27   GLUCOSE  352*   BUN  29*   CREATININE  1.37   CALCIUM  8.8         Recent Labs      06/25/19   1237  06/27/19   0303   BNPBTYPENAT  980*  811*           Imaging  US-RACHEL SINGLE LEVEL BILAT   Final Result      EC-ECHOCARDIOGRAM COMPLETE W/ CONT   Final Result      US-EXTREMITY VENOUS LOWER BILAT   Final Result      DX-SHOULDER 2+ RIGHT   Final Result      1.  No acute findings.      2.  Severe degenerative change in the right acromioclavicular joint.      DX-FOOT-COMPLETE 3+ RIGHT   Final Result      1.  Lucency in the right fifth toe, possibly representing osteomyelitis. If clinically indicated, MRI could be performed for confirmation.      2.  Soft tissue edema within the forefoot.      DX-FOOT-COMPLETE 3+ LEFT   Final Result      1.  No definitive evidence for osteomyelitis.      2.  Forefoot deformities as described.      DX-CHEST-PORTABLE (1 VIEW)   Final Result      Bilateral interstitial opacities may represent interstitial edema or pneumonitis.      Mild cardiomegaly.      Atherosclerotic plaque.              Assessment/Plan  CHF (congestive heart failure) (HCC)   Assessment & Plan    Echo reveals EF of 35%  New diagnosis  Known CAD with multivessel disease  Mild volume overload undergoing diuresis  Continue coreg and lasix.  Cardiology consulted.        Diabetic foot infection (HCC)   Assessment & Plan    History of chronic diabetic wounds to bilateral feet.  Has been following with outpatient wound clinic  Recent worsening of wounds with purulent drainage.   Cultures pending.   ID following.  Continue unasyn and linezolid  He found to have possible right foot osteomyelitis on x-ray.  LPS following  Pending surgical I&D.   Awaiting cardiac clearance due to new CHF diagnosis.        Venous  stasis dermatitis of both lower extremities   Assessment & Plan    Secondary to chronic edema  Continue lasix.  Skin care.      Macrocytosis   Assessment & Plan    TSH, vitamin B12 and folic acid wnl.     Elevated troponin   Assessment & Plan    He found to have mildly elevated troponin.  He denies any symptoms of chest pain.  EKG stable  ? Stress induced secondary to volume overload  Echo shows EF 35%.  Continue lasix.   Cardiology consulted.        CKD (chronic kidney disease), stage III ([facility])- (present on admission)   Assessment & Plan    History of chronic kidney disease.  Currently renal functions are at the baseline.  Avoid nephrotoxins.  Continue to monitor     CAD (coronary artery disease)- (present on admission)   Assessment & Plan    Hx of  Continue lipitor and ASA.      Mixed hyperlipidemia- (present on admission)   Assessment & Plan    Continue lipitor.      Edema- (present on admission)   Assessment & Plan    Hx of chronic BLE edema.  Worse prior to admit.  Secondary to CHF.  BNP elevated.   LE US negative for DVT  RACHEL negative for arterial disease.   Continue IV lasix.         COPD (chronic obstructive pulmonary disease) ([facility])- (present on admission)   Assessment & Plan    Wheezing noted on exam  Denies shortness of breath  Pro curt negative  Continue RT protocol, BT, and supplemental o2.      Type 2 diabetes mellitus with kidney complication, without long-term current use of insulin ([facility])- (present on admission)   Assessment & Plan    Uncontrolled  A1c 10.3  Hold metformin  Continue SSI while in the hospital   Diabetic diet and accuchecks.      Essential hypertension, benign- (present on admission)   Assessment & Plan    Controlled.  Continue coreg.             VTE prophylaxis: Heparin

## 2019-06-28 ENCOUNTER — ANESTHESIA EVENT (OUTPATIENT)
Dept: SURGERY | Facility: MEDICAL CENTER | Age: 79
DRG: 622 | End: 2019-06-28
Payer: MEDICARE

## 2019-06-28 ENCOUNTER — ANESTHESIA (OUTPATIENT)
Dept: SURGERY | Facility: MEDICAL CENTER | Age: 79
DRG: 622 | End: 2019-06-28
Payer: MEDICARE

## 2019-06-28 LAB
ANION GAP SERPL CALC-SCNC: 11 MMOL/L (ref 0–11.9)
BASOPHILS # BLD AUTO: 0.5 % (ref 0–1.8)
BASOPHILS # BLD: 0.03 K/UL (ref 0–0.12)
BNP SERPL-MCNC: 1345 PG/ML (ref 0–100)
BUN SERPL-MCNC: 30 MG/DL (ref 8–22)
CALCIUM SERPL-MCNC: 8.9 MG/DL (ref 8.5–10.5)
CHLORIDE SERPL-SCNC: 105 MMOL/L (ref 96–112)
CO2 SERPL-SCNC: 27 MMOL/L (ref 20–33)
CREAT SERPL-MCNC: 1.48 MG/DL (ref 0.5–1.4)
EOSINOPHIL # BLD AUTO: 0.19 K/UL (ref 0–0.51)
EOSINOPHIL NFR BLD: 3.4 % (ref 0–6.9)
ERYTHROCYTE [DISTWIDTH] IN BLOOD BY AUTOMATED COUNT: 52.1 FL (ref 35.9–50)
GLUCOSE BLD-MCNC: 107 MG/DL (ref 65–99)
GLUCOSE BLD-MCNC: 114 MG/DL (ref 65–99)
GLUCOSE BLD-MCNC: 121 MG/DL (ref 65–99)
GLUCOSE BLD-MCNC: 132 MG/DL (ref 65–99)
GLUCOSE SERPL-MCNC: 144 MG/DL (ref 65–99)
HCT VFR BLD AUTO: 36.6 % (ref 42–52)
HGB BLD-MCNC: 11 G/DL (ref 14–18)
IMM GRANULOCYTES # BLD AUTO: 0.02 K/UL (ref 0–0.11)
IMM GRANULOCYTES NFR BLD AUTO: 0.4 % (ref 0–0.9)
LYMPHOCYTES # BLD AUTO: 1.12 K/UL (ref 1–4.8)
LYMPHOCYTES NFR BLD: 20.3 % (ref 22–41)
MCH RBC QN AUTO: 29.6 PG (ref 27–33)
MCHC RBC AUTO-ENTMCNC: 30.1 G/DL (ref 33.7–35.3)
MCV RBC AUTO: 98.7 FL (ref 81.4–97.8)
MONOCYTES # BLD AUTO: 0.64 K/UL (ref 0–0.85)
MONOCYTES NFR BLD AUTO: 11.6 % (ref 0–13.4)
NEUTROPHILS # BLD AUTO: 3.52 K/UL (ref 1.82–7.42)
NEUTROPHILS NFR BLD: 63.8 % (ref 44–72)
NRBC # BLD AUTO: 0 K/UL
NRBC BLD-RTO: 0 /100 WBC
PLATELET # BLD AUTO: 216 K/UL (ref 164–446)
PMV BLD AUTO: 10.3 FL (ref 9–12.9)
POTASSIUM SERPL-SCNC: 4.3 MMOL/L (ref 3.6–5.5)
RBC # BLD AUTO: 3.71 M/UL (ref 4.7–6.1)
SODIUM SERPL-SCNC: 143 MMOL/L (ref 135–145)
WBC # BLD AUTO: 5.5 K/UL (ref 4.8–10.8)

## 2019-06-28 PROCEDURE — 700102 HCHG RX REV CODE 250 W/ 637 OVERRIDE(OP): Performed by: NURSE PRACTITIONER

## 2019-06-28 PROCEDURE — A9270 NON-COVERED ITEM OR SERVICE: HCPCS | Performed by: INTERNAL MEDICINE

## 2019-06-28 PROCEDURE — 87075 CULTR BACTERIA EXCEPT BLOOD: CPT | Mod: 91

## 2019-06-28 PROCEDURE — 501838 HCHG SUTURE GENERAL: Performed by: ORTHOPAEDIC SURGERY

## 2019-06-28 PROCEDURE — 85025 COMPLETE CBC W/AUTO DIFF WBC: CPT

## 2019-06-28 PROCEDURE — 0LBV0ZZ EXCISION OF RIGHT FOOT TENDON, OPEN APPROACH: ICD-10-PCS | Performed by: ORTHOPAEDIC SURGERY

## 2019-06-28 PROCEDURE — 82962 GLUCOSE BLOOD TEST: CPT

## 2019-06-28 PROCEDURE — 0L8P0ZZ DIVISION OF LEFT LOWER LEG TENDON, OPEN APPROACH: ICD-10-PCS | Performed by: ORTHOPAEDIC SURGERY

## 2019-06-28 PROCEDURE — 500423 HCHG DRESSING, ABD COMBINE: Performed by: ORTHOPAEDIC SURGERY

## 2019-06-28 PROCEDURE — 0SBM0ZZ EXCISION OF RIGHT METATARSAL-PHALANGEAL JOINT, OPEN APPROACH: ICD-10-PCS | Performed by: ORTHOPAEDIC SURGERY

## 2019-06-28 PROCEDURE — 700111 HCHG RX REV CODE 636 W/ 250 OVERRIDE (IP): Performed by: STUDENT IN AN ORGANIZED HEALTH CARE EDUCATION/TRAINING PROGRAM

## 2019-06-28 PROCEDURE — 0QBP0ZZ EXCISION OF LEFT METATARSAL, OPEN APPROACH: ICD-10-PCS | Performed by: ORTHOPAEDIC SURGERY

## 2019-06-28 PROCEDURE — 160029 HCHG SURGERY MINUTES - 1ST 30 MINS LEVEL 4: Performed by: ORTHOPAEDIC SURGERY

## 2019-06-28 PROCEDURE — 0LNW3ZZ RELEASE LEFT FOOT TENDON, PERCUTANEOUS APPROACH: ICD-10-PCS | Performed by: ORTHOPAEDIC SURGERY

## 2019-06-28 PROCEDURE — 770020 HCHG ROOM/CARE - TELE (206)

## 2019-06-28 PROCEDURE — 160009 HCHG ANES TIME/MIN: Performed by: ORTHOPAEDIC SURGERY

## 2019-06-28 PROCEDURE — 500881 HCHG PACK, EXTREMITY: Performed by: ORTHOPAEDIC SURGERY

## 2019-06-28 PROCEDURE — 0L8N0ZZ DIVISION OF RIGHT LOWER LEG TENDON, OPEN APPROACH: ICD-10-PCS | Performed by: ORTHOPAEDIC SURGERY

## 2019-06-28 PROCEDURE — 87077 CULTURE AEROBIC IDENTIFY: CPT

## 2019-06-28 PROCEDURE — 700111 HCHG RX REV CODE 636 W/ 250 OVERRIDE (IP): Performed by: INTERNAL MEDICINE

## 2019-06-28 PROCEDURE — 99232 SBSQ HOSP IP/OBS MODERATE 35: CPT | Performed by: INTERNAL MEDICINE

## 2019-06-28 PROCEDURE — 700102 HCHG RX REV CODE 250 W/ 637 OVERRIDE(OP): Performed by: INTERNAL MEDICINE

## 2019-06-28 PROCEDURE — 87206 SMEAR FLUORESCENT/ACID STAI: CPT

## 2019-06-28 PROCEDURE — 87205 SMEAR GRAM STAIN: CPT | Mod: 91

## 2019-06-28 PROCEDURE — 700101 HCHG RX REV CODE 250: Performed by: STUDENT IN AN ORGANIZED HEALTH CARE EDUCATION/TRAINING PROGRAM

## 2019-06-28 PROCEDURE — 0JBQ0ZZ EXCISION OF RIGHT FOOT SUBCUTANEOUS TISSUE AND FASCIA, OPEN APPROACH: ICD-10-PCS | Performed by: ORTHOPAEDIC SURGERY

## 2019-06-28 PROCEDURE — 700102 HCHG RX REV CODE 250 W/ 637 OVERRIDE(OP): Performed by: STUDENT IN AN ORGANIZED HEALTH CARE EDUCATION/TRAINING PROGRAM

## 2019-06-28 PROCEDURE — 160041 HCHG SURGERY MINUTES - EA ADDL 1 MIN LEVEL 4: Performed by: ORTHOPAEDIC SURGERY

## 2019-06-28 PROCEDURE — A6223 GAUZE >16<=48 NO W/SAL W/O B: HCPCS | Performed by: ORTHOPAEDIC SURGERY

## 2019-06-28 PROCEDURE — 36415 COLL VENOUS BLD VENIPUNCTURE: CPT

## 2019-06-28 PROCEDURE — 83880 ASSAY OF NATRIURETIC PEPTIDE: CPT

## 2019-06-28 PROCEDURE — 0JDQ0ZZ EXTRACTION OF RIGHT FOOT SUBCUTANEOUS TISSUE AND FASCIA, OPEN APPROACH: ICD-10-PCS | Performed by: ORTHOPAEDIC SURGERY

## 2019-06-28 PROCEDURE — A9270 NON-COVERED ITEM OR SERVICE: HCPCS | Performed by: NURSE PRACTITIONER

## 2019-06-28 PROCEDURE — 160035 HCHG PACU - 1ST 60 MINS PHASE I: Performed by: ORTHOPAEDIC SURGERY

## 2019-06-28 PROCEDURE — 80048 BASIC METABOLIC PNL TOTAL CA: CPT

## 2019-06-28 PROCEDURE — 87070 CULTURE OTHR SPECIMN AEROBIC: CPT

## 2019-06-28 PROCEDURE — 160002 HCHG RECOVERY MINUTES (STAT): Performed by: ORTHOPAEDIC SURGERY

## 2019-06-28 PROCEDURE — 87186 SC STD MICRODIL/AGAR DIL: CPT

## 2019-06-28 PROCEDURE — 700105 HCHG RX REV CODE 258: Performed by: STUDENT IN AN ORGANIZED HEALTH CARE EDUCATION/TRAINING PROGRAM

## 2019-06-28 PROCEDURE — 160036 HCHG PACU - EA ADDL 30 MINS PHASE I: Performed by: ORTHOPAEDIC SURGERY

## 2019-06-28 PROCEDURE — 0LNV3ZZ RELEASE RIGHT FOOT TENDON, PERCUTANEOUS APPROACH: ICD-10-PCS | Performed by: ORTHOPAEDIC SURGERY

## 2019-06-28 PROCEDURE — 87116 MYCOBACTERIA CULTURE: CPT

## 2019-06-28 PROCEDURE — 87102 FUNGUS ISOLATION CULTURE: CPT | Mod: 91

## 2019-06-28 PROCEDURE — A9270 NON-COVERED ITEM OR SERVICE: HCPCS | Performed by: STUDENT IN AN ORGANIZED HEALTH CARE EDUCATION/TRAINING PROGRAM

## 2019-06-28 PROCEDURE — 87015 SPECIMEN INFECT AGNT CONCNTJ: CPT

## 2019-06-28 PROCEDURE — 160048 HCHG OR STATISTICAL LEVEL 1-5: Performed by: ORTHOPAEDIC SURGERY

## 2019-06-28 RX ORDER — SODIUM CHLORIDE, SODIUM LACTATE, POTASSIUM CHLORIDE, CALCIUM CHLORIDE 600; 310; 30; 20 MG/100ML; MG/100ML; MG/100ML; MG/100ML
INJECTION, SOLUTION INTRAVENOUS
Status: DISCONTINUED | OUTPATIENT
Start: 2019-06-28 | End: 2019-06-28 | Stop reason: SURG

## 2019-06-28 RX ORDER — SODIUM CHLORIDE, SODIUM LACTATE, POTASSIUM CHLORIDE, CALCIUM CHLORIDE 600; 310; 30; 20 MG/100ML; MG/100ML; MG/100ML; MG/100ML
INJECTION, SOLUTION INTRAVENOUS CONTINUOUS
Status: DISCONTINUED | OUTPATIENT
Start: 2019-06-28 | End: 2019-06-28 | Stop reason: HOSPADM

## 2019-06-28 RX ORDER — ACETAMINOPHEN 500 MG
1000 TABLET ORAL ONCE
Status: COMPLETED | OUTPATIENT
Start: 2019-06-28 | End: 2019-06-28

## 2019-06-28 RX ORDER — OXYCODONE HCL 5 MG/5 ML
5 SOLUTION, ORAL ORAL
Status: DISCONTINUED | OUTPATIENT
Start: 2019-06-28 | End: 2019-06-28 | Stop reason: HOSPADM

## 2019-06-28 RX ORDER — OXYCODONE HCL 5 MG/5 ML
10 SOLUTION, ORAL ORAL
Status: DISCONTINUED | OUTPATIENT
Start: 2019-06-28 | End: 2019-06-28 | Stop reason: HOSPADM

## 2019-06-28 RX ORDER — HYDROMORPHONE HYDROCHLORIDE 1 MG/ML
0.4 INJECTION, SOLUTION INTRAMUSCULAR; INTRAVENOUS; SUBCUTANEOUS
Status: DISCONTINUED | OUTPATIENT
Start: 2019-06-28 | End: 2019-06-28 | Stop reason: HOSPADM

## 2019-06-28 RX ORDER — HALOPERIDOL 5 MG/ML
1 INJECTION INTRAMUSCULAR
Status: DISCONTINUED | OUTPATIENT
Start: 2019-06-28 | End: 2019-06-28 | Stop reason: HOSPADM

## 2019-06-28 RX ORDER — HYDROMORPHONE HYDROCHLORIDE 1 MG/ML
0.2 INJECTION, SOLUTION INTRAMUSCULAR; INTRAVENOUS; SUBCUTANEOUS
Status: DISCONTINUED | OUTPATIENT
Start: 2019-06-28 | End: 2019-06-28 | Stop reason: HOSPADM

## 2019-06-28 RX ORDER — ONDANSETRON 2 MG/ML
4 INJECTION INTRAMUSCULAR; INTRAVENOUS
Status: DISCONTINUED | OUTPATIENT
Start: 2019-06-28 | End: 2019-06-28 | Stop reason: HOSPADM

## 2019-06-28 RX ORDER — HYDROMORPHONE HYDROCHLORIDE 1 MG/ML
0.1 INJECTION, SOLUTION INTRAMUSCULAR; INTRAVENOUS; SUBCUTANEOUS
Status: DISCONTINUED | OUTPATIENT
Start: 2019-06-28 | End: 2019-06-28 | Stop reason: HOSPADM

## 2019-06-28 RX ADMIN — ATORVASTATIN CALCIUM 20 MG: 20 TABLET, FILM COATED ORAL at 05:15

## 2019-06-28 RX ADMIN — LIDOCAINE HYDROCHLORIDE 40 MG: 20 INJECTION, SOLUTION INTRAVENOUS at 16:00

## 2019-06-28 RX ADMIN — LINEZOLID 600 MG: 600 TABLET, FILM COATED ORAL at 05:16

## 2019-06-28 RX ADMIN — FENTANYL CITRATE 50 MCG: 50 INJECTION, SOLUTION INTRAMUSCULAR; INTRAVENOUS at 15:55

## 2019-06-28 RX ADMIN — FAMOTIDINE 20 MG: 20 TABLET ORAL at 05:16

## 2019-06-28 RX ADMIN — ACETAMINOPHEN 1000 MG: 500 TABLET ORAL at 15:43

## 2019-06-28 RX ADMIN — EPHEDRINE SULFATE 5 MG: 50 INJECTION INTRAMUSCULAR; INTRAVENOUS; SUBCUTANEOUS at 16:28

## 2019-06-28 RX ADMIN — ASPIRIN 81 MG 81 MG: 81 TABLET ORAL at 05:16

## 2019-06-28 RX ADMIN — FENTANYL CITRATE 100 MCG: 50 INJECTION, SOLUTION INTRAMUSCULAR; INTRAVENOUS at 16:25

## 2019-06-28 RX ADMIN — SODIUM CHLORIDE, POTASSIUM CHLORIDE, SODIUM LACTATE AND CALCIUM CHLORIDE: 600; 310; 30; 20 INJECTION, SOLUTION INTRAVENOUS at 15:48

## 2019-06-28 RX ADMIN — POTASSIUM CHLORIDE 10 MEQ: 1500 TABLET, EXTENDED RELEASE ORAL at 05:17

## 2019-06-28 RX ADMIN — PROPOFOL 80 MG: 10 INJECTION, EMULSION INTRAVENOUS at 16:00

## 2019-06-28 RX ADMIN — EPHEDRINE SULFATE 5 MG: 50 INJECTION INTRAMUSCULAR; INTRAVENOUS; SUBCUTANEOUS at 16:30

## 2019-06-28 RX ADMIN — FUROSEMIDE 20 MG: 10 INJECTION, SOLUTION INTRAVENOUS at 05:21

## 2019-06-28 RX ADMIN — CARVEDILOL 3.12 MG: 3.12 TABLET, FILM COATED ORAL at 08:30

## 2019-06-28 ASSESSMENT — ENCOUNTER SYMPTOMS
WEAKNESS: 0
HEADACHES: 0
DIARRHEA: 0
PALPITATIONS: 0
SHORTNESS OF BREATH: 0
COUGH: 0
MYALGIAS: 0
HEARTBURN: 0
SORE THROAT: 0
FALLS: 0
FEVER: 0
CONSTIPATION: 0
BACK PAIN: 0
TINGLING: 0
DEPRESSION: 0
DIZZINESS: 0
NERVOUS/ANXIOUS: 0
NAUSEA: 0
SPUTUM PRODUCTION: 0
WHEEZING: 1
ABDOMINAL PAIN: 0
EYE PAIN: 0
TREMORS: 0
PND: 0
NECK PAIN: 0
SPEECH CHANGE: 0
VOMITING: 0
SENSORY CHANGE: 0
SEIZURES: 0
BLURRED VISION: 0
SENSORY CHANGE: 1
CHILLS: 0
WEIGHT LOSS: 0

## 2019-06-28 ASSESSMENT — LIFESTYLE VARIABLES: SUBSTANCE_ABUSE: 0

## 2019-06-28 NOTE — PROGRESS NOTES
Assumed care at 0700. Bedside report received from Landon. Patient's chart and MAR reviewed. Pt sleeping at this time.  White board updated. Call light, phone and personal belongings within reach.

## 2019-06-28 NOTE — CARE PLAN
Problem: Communication  Goal: The ability to communicate needs accurately and effectively will improve  Outcome: PROGRESSING AS EXPECTED  Discussed POC with patient, patient agrees to participate in POC.  Patient encouraged to use call bell to ring for assistance.  Patient oriented to room, call bell, and bed controls.  Open line of communication established with the patient.      NPO at midnight for OR in am for I&D of Right Foot    Problem: Safety  Goal: Will remain free from injury  Outcome: PROGRESSING AS EXPECTED  Instructed patient to use call bell and ring for assistance prior to ambulation.  Non-Skid foot ware in place, bed in low locked position, call bell and personal belongings within reach.        Problem: Infection  Goal: Will remain free from infection  Outcome: PROGRESSING AS EXPECTED  IV Cefapime and PO Zyvox     Problem: Venous Thromboembolism (VTW)/Deep Vein Thrombosis (DVT) Prevention:  Goal: Patient will participate in Venous Thrombosis (VTE)/Deep Vein Thrombosis (DVT)Prevention Measures  Outcome: PROGRESSING AS EXPECTED  SQ Heparin     Problem: Knowledge Deficit  Goal: Knowledge of disease process/condition, treatment plan, diagnostic tests, and medications will improve  Outcome: PROGRESSING AS EXPECTED  Discussed plan of care and medications with patient.  Patient verbalizes understandings of treatment regimen.        Problem: Skin Integrity  Goal: Risk for impaired skin integrity will decrease  Outcome: PROGRESSING AS EXPECTED  See Skin Note

## 2019-06-28 NOTE — DISCHARGE PLANNING
Care Transition Team Assessment    The patient is on 3L of O2 at baseline. He uses [employer]. Patient's spouse is going to give patient a ride at discharge.      Information Source  Orientation : Oriented x 4  Information Given By: Patient         Elopement Risk  Legal Hold: No  Ambulatory or Self Mobile in Wheelchair: No-Not an Elopement Risk  Elopement Risk: Not at Risk for Elopement    Interdisciplinary Discharge Planning  Lives with - Patient's Self Care Capacity: Spouse, Adult Children  Patient or legal guardian wants to designate a caregiver (see row info): No  Support Systems: Children, Spouse / Significant Other  Able to Return to Previous ADL's: Yes  Mobility Issues: No  Prior Services: Unable To Determine At This Time  Patient Expects to be Discharged to:: home  Assistance Needed: Unknown at this Time  Durable Medical Equipment: Home Oxygen  DME Provider / Phone: [facility]    Discharge Preparedness  What is your plan after discharge?: Home with help  What are your discharge supports?: Child, Spouse  Prior Functional Level: Ambulatory, Independent with Activities of Daily Living    Functional Assesment  Prior Functional Level: Ambulatory, Independent with Activities of Daily Living    Finances  Financial Barriers to Discharge: No  Prescription Coverage: Yes                   Domestic Abuse  Have you ever been the victim of abuse or violence?: No              Anticipated Discharge Information  Anticipated discharge disposition: Home  Discharge Address: [address] [place] [id number]

## 2019-06-28 NOTE — PROGRESS NOTES
Received pt report from day RY Dorantes.    Pt awake, alert, oriented X 4.  Pt resting in bed.  Bed in low locked position, call bell at the bedside, tray table & personal belongings within reach. Non-skid footwear intact. White board updated to reflect plan of care for current shift. Pt door tags reviewed. Bed Alarm ON.    Assessed patient’s Neuro Status, Comfort Level, & Immediate Needs.    Vitals WNL.    Tele NSR.    Landon Melchor

## 2019-06-28 NOTE — PROGRESS NOTES
[facility] Medicine Daily Progress Note    Date of Service  6/28/2019    Chief Complaint  79 y.o. male admitted 6/25/2019 with BLE swelling and diabetic foot infection.    Hospital Course    H/O COPD, CAD, HTN, DM, HLD.  Patient follows with outpatient wound care for chronic, nonhealing bilateral diabetic foot wounds.  Presented to ED after developing worsening erythema and swelling of BLE as well as purulent drainage from foot wounds.  Started on antibiotics.  ID and LPS consulted.  Also found to have mildly elevated troponin with stable EKG.  Suspect volume overload with elevated BNP and edema noted on chest x-ray.  Continue diuresis.  Echo revealing new CHF with EF 35%.  Cardiology consulted.  Pending surgical I&D.  Will need cardiac clearance prior to surgery.          Interval Problem Update  6/26:  Bilateral foot wounds noted with purulent drainage.  Dressing intact.  Denies pain.  Improvement of BLE edema with diuresis, although noted to have stasis dermatitis.  Wheezing noted on ausculation.  Denies shortness of breath.  Pro curt negative.  Bilateral opacities noted on CXR.  Echo pending.   6/27:  Echo reveals EF 35%.  New HF diagnosis.  Cardiology consulted.  Edema overall improved with diuresis.  Troponin trending down.  No chest pain.  Pending surgical I&D.  Will need cardiac clearance prior.  Hgb A1c 10.3.  Started on SSI.     6/28.  Patient has been n.p.o. to prepare for surgery today.  Patient agreed with surgery.  Patient's breathing condition improved after diuretics.  Patient complains of local pain. Patient's pain is local, 4-6/10, intermittent and does not radiate to other location, sharp and with some tingling. Can be controlled by pain meds. Dressing in place.    Consultants/Specialty  ID  LPS  Cardiology    Code Status  FULL    Disposition  Transfer to telemetry.     Review of Systems  Review of Systems   Constitutional: Negative for chills, fever, malaise/fatigue and weight loss.   HENT: Negative  for congestion, ear discharge, ear pain, hearing loss and nosebleeds.    Eyes: Negative for blurred vision and pain.   Respiratory: Positive for wheezing. Negative for cough, sputum production and shortness of breath.    Cardiovascular: Positive for leg swelling. Negative for chest pain, palpitations and PND.   Gastrointestinal: Negative for abdominal pain, constipation, diarrhea, heartburn, nausea and vomiting.   Genitourinary: Negative for dysuria, frequency and hematuria.   Musculoskeletal: Negative for back pain, falls, joint pain, myalgias and neck pain.   Skin: Negative for itching and rash.        BLE swelling and erythema.  Bilateral foot wounds.    Neurological: Negative for dizziness, tingling, tremors, sensory change, speech change, seizures, weakness and headaches.   Psychiatric/Behavioral: Negative for depression, substance abuse and suicidal ideas.        Physical Exam  Temp:  [36 °C (96.8 °F)-36.4 °C (97.5 °F)] 36.3 °C (97.4 °F)  Pulse:  [79-91] 80  Resp:  [14-20] 16  BP: (108-124)/(70-82) 115/72  SpO2:  [92 %-98 %] 92 %    Physical Exam   Constitutional: He is oriented to person, place, and time. He appears well-developed and well-nourished.   Obese male.    HENT:   Head: Normocephalic and atraumatic.   Right Ear: External ear normal.   Left Ear: External ear normal.   Mouth/Throat: No oropharyngeal exudate.   Eyes: Pupils are equal, round, and reactive to light. Conjunctivae and EOM are normal. No scleral icterus.   Neck: Normal range of motion. Neck supple. No JVD present. No thyromegaly present.   Cardiovascular: Normal rate, regular rhythm and normal heart sounds.  Exam reveals no gallop and no friction rub.    No murmur heard.  BLE edema   Pulmonary/Chest: Effort normal. No stridor. No respiratory distress. He has decreased breath sounds in the right lower field and the left lower field. He has wheezes in the right lower field, the left upper field, the left middle field and the left lower  field. He has no rales. He exhibits no tenderness.   Abdominal: Soft. Bowel sounds are normal. He exhibits no distension and no mass. There is no tenderness. There is no rebound and no guarding.   Musculoskeletal: Normal range of motion. He exhibits edema. He exhibits no tenderness or deformity.   Lymphadenopathy:     He has no cervical adenopathy.   Neurological: He is alert and oriented to person, place, and time. He displays normal reflexes. No cranial nerve deficit.   Skin: Skin is warm and dry. No rash noted. He is not diaphoretic. There is erythema.   Stasis dermatitis noted to BLE   Bilateral diabetic foot wounds with purulent drainage.  Dressing intact.    Psychiatric: He has a normal mood and affect.   Nursing note and vitals reviewed.      Fluids    Intake/Output Summary (Last 24 hours) at 06/28/19 1433  Last data filed at 06/28/19 1210   Gross per 24 hour   Intake              120 ml   Output              750 ml   Net             -630 ml       Laboratory  Recent Labs      06/27/19   1447  06/28/19   0553   WBC  6.1  5.5   RBC  3.70*  3.71*   HEMOGLOBIN  11.2*  11.0*   HEMATOCRIT  37.3*  36.6*   MCV  100.8*  98.7*   MCH  30.3  29.6   MCHC  30.0*  30.1*   RDW  52.3*  52.1*   PLATELETCT  211  216   MPV  10.2  10.3     Recent Labs      06/27/19   1447  06/28/19   0553   SODIUM  141  143   POTASSIUM  4.6  4.3   CHLORIDE  102  105   CO2  29  27   GLUCOSE  110*  144*   BUN  27*  30*   CREATININE  1.46*  1.48*   CALCIUM  9.2  8.9         Recent Labs      06/27/19   0303  06/28/19   0553   BNPBTYPENAT  811*  1345*           Imaging  US-RACHEL SINGLE LEVEL BILAT   Final Result      EC-ECHOCARDIOGRAM COMPLETE W/ CONT   Final Result      US-EXTREMITY VENOUS LOWER BILAT   Final Result      DX-SHOULDER 2+ RIGHT   Final Result      1.  No acute findings.      2.  Severe degenerative change in the right acromioclavicular joint.      DX-FOOT-COMPLETE 3+ RIGHT   Final Result      1.  Lucency in the right fifth toe, possibly  representing osteomyelitis. If clinically indicated, MRI could be performed for confirmation.      2.  Soft tissue edema within the forefoot.      DX-FOOT-COMPLETE 3+ LEFT   Final Result      1.  No definitive evidence for osteomyelitis.      2.  Forefoot deformities as described.      DX-CHEST-PORTABLE (1 VIEW)   Final Result      Bilateral interstitial opacities may represent interstitial edema or pneumonitis.      Mild cardiomegaly.      Atherosclerotic plaque.              Assessment/Plan  Acute systolic CHF (congestive heart failure) (HCC)   Assessment & Plan    Echo reveals EF of 35%  New diagnosis  Known CAD with multivessel disease  Mild volume overload undergoing diuresis  Continue coreg and lasix.  Cardiology consulted.  Pending cardiology further recommendation.  Patient currently kidney function not optimal, we will hold of ACE inhibitor for now and monitor patient's kidney function.     Diabetic foot infection (HCC)   Assessment & Plan    History of chronic diabetic wounds to bilateral feet.  Has been following with outpatient wound clinic  Recent worsening of wounds with purulent drainage.   Cultures pending.   ID following.  Continue cefepime and linezolid  He found to have possible right foot osteomyelitis on x-ray.  LPS following  Pending surgical I&D.   Awaiting cardiac clearance due to new CHF diagnosis.  Patient currently have no chest pain     Venous stasis dermatitis of both lower extremities   Assessment & Plan    Secondary to chronic edema  Continue lasix.  Skin care.      Macrocytosis   Assessment & Plan    TSH, vitamin B12 and folic acid wnl.     Elevated troponin   Assessment & Plan    He found to have mildly elevated troponin.  He denies any symptoms of chest pain.  EKG stable  ? Stress induced secondary to volume overload  Echo shows EF 35%.  Continue lasix.   Cardiology consulted.      CKD (chronic kidney disease), stage III ([facility])- (present on admission)   Assessment & Plan    History  of chronic kidney disease.  Currently renal functions are at the baseline.  Avoid nephrotoxins.  Continue to monitor  Hold off ACE inhibitor for now until clinically patient is more euvolemic and then challenge with small dose     CAD (coronary artery disease)- (present on admission)   Assessment & Plan    Hx of  Continue lipitor and ASA and beta-blocker currently no chest pain.      Mixed hyperlipidemia- (present on admission)   Assessment & Plan    Continue lipitor.      Edema- (present on admission)   Assessment & Plan    Hx of chronic BLE edema.  Worse prior to admit.  Secondary to CHF.  BNP elevated.   LE US negative for DVT  RACHEL negative for arterial disease.   Continue IV lasix.         COPD (chronic obstructive pulmonary disease) (HCC)- (present on admission)   Assessment & Plan    Wheezing noted on exam  Denies shortness of breath  Pro curt negative  Continue RT protocol, BT, and supplemental o2.      Type 2 diabetes mellitus with kidney complication, without long-term current use of insulin (HCC)- (present on admission)   Assessment & Plan    Uncontrolled  A1c 10.3  Hold metformin  Continue SSI while in the hospital   Diabetic diet and accuchecks.      Essential hypertension, benign- (present on admission)   Assessment & Plan    Controlled.  Continue coreg.             VTE prophylaxis: Heparin      Current Facility-Administered Medications:   •  acetaminophen (TYLENOL) tablet 1,000 mg, 1,000 mg, Oral, Once, Ren Howell M.D.  •  cefepime (MAXIPIME) 2 g in  mL IVPB, 2 g, Intravenous, Q12HRS, Ryanne Martinez, A.P.R.N.  •  Respiratory Care per Protocol, , Nebulization, Continuous RT, Jose Monterroso, A.P.R.N.  •  linezolid (ZYVOX) tablet 600 mg, 600 mg, Oral, Q12HRS, Jose Monterroso, A.P.R.N., 600 mg at 06/28/19 0516  •  insulin regular (HUMULIN R) injection 2-12 Units, 2-12 Units, Subcutaneous, 4X/DAY ACHS, Stopped at 06/28/19 0700 **AND** Accu-Chek ACHS, , , Q AC AND BEDTIME(S) **AND** NOTIFY MD and PharmD,  , , Once **AND** glucose 4 g chewable tablet 16 g, 16 g, Oral, Q15 MIN PRN **AND** DEXTROSE 10% BOLUS 250 mL, 250 mL, Intravenous, Q15 MIN PRN, Errol Sotomayor M.D.  •  senna-docusate (PERICOLACE or SENOKOT S) 8.6-50 MG per tablet 2 Tab, 2 Tab, Oral, BID, 2 Tab at 06/27/19 1738 **AND** polyethylene glycol/lytes (MIRALAX) PACKET 1 Packet, 1 Packet, Oral, QDAY PRN **AND** magnesium hydroxide (MILK OF MAGNESIA) suspension 30 mL, 30 mL, Oral, QDAY PRN **AND** bisacodyl (DULCOLAX) suppository 10 mg, 10 mg, Rectal, QDAY PRN, Andi Winters M.D.  •  heparin injection 5,000 Units, 5,000 Units, Subcutaneous, Q8HRS, Andi Winters M.D., Stopped at 06/28/19 0521  •  acetaminophen (TYLENOL) tablet 650 mg, 650 mg, Oral, Q6HRS PRN, Andi Winters M.D.  •  albuterol (PROVENTIL) 2.5mg/0.5ml nebulizer solution 2.5 mg, 2.5 mg, Nebulization, Q4HRS PRN, Andi Winters M.D.  •  aspirin (ASA) chewable tab 81 mg, 81 mg, Oral, DAILY, Andi Winters M.D., 81 mg at 06/28/19 0516  •  atorvastatin (LIPITOR) tablet 20 mg, 20 mg, Oral, DAILY, Andi Winters M.D., 20 mg at 06/28/19 0515  •  carvedilol (COREG) tablet 3.125 mg, 3.125 mg, Oral, BID WITH MEALS, Andi Winters M.D., 3.125 mg at 06/28/19 0830  •  famotidine (PEPCID) tablet 20 mg, 20 mg, Oral, DAILY, Andi Winters M.D., 20 mg at 06/28/19 0516  •  potassium chloride SA (Kdur) tablet 10 mEq, 10 mEq, Oral, DAILY, Andi Winters M.D., 10 mEq at 06/28/19 0517  •  furosemide (LASIX) injection 20 mg, 20 mg, Intravenous, BID DIURETIC, Andi Winters M.D., 20 mg at 06/28/19 0521

## 2019-06-28 NOTE — CONSULTS
6/28/2019    David Figueroa is a 79 y.o. male who presents with BLE nonhealing ulcers refractory to conservative management. Has been admitted for ID and LPS consults.  Indicated for surgical management.    Past Medical History:   Diagnosis Date   • Asthma    • Breath shortness     O2  3 l/m at night   • Cold 1/2015   • Dental disorder     upper lower   • Diabetes     oral meds   • Emphysema     O2 3 liters prn   • Hiatus hernia syndrome    • High cholesterol    • Hyperlipidemia    • Hypertension    • Other emphysema (HCC)    • Paroxysmal atrial fibrillation (HCC)    • Pneumonia 2013   • Snoring    • Stroke (HCC) 1999   • Weakness     since pneumonia       Past Surgical History:   Procedure Laterality Date   • RECOVERY  3/4/2015    Performed by Cath-Recovery Surgery at SURGERY SAME DAY [facility] ORS   • CAROTID ENDARTERECTOMY  9/17/2014    Performed by Willy Solares M.D. at SURGERY [facility] ORS   • ZZZ CARDIAC CATH      inoperable disease.       Medications  No current facility-administered medications on file prior to encounter.      Current Outpatient Prescriptions on File Prior to Encounter   Medication Sig Dispense Refill   • furosemide (LASIX) 40 MG Tab Take 1 Tab by mouth every day. 90 Tab 3   • carvedilol (COREG) 3.125 MG Tab Take 1 Tab by mouth 2 times a day, with meals. 180 Tab 3   • atorvastatin (LIPITOR) 20 MG Tab Take 1 Tab by mouth every day. 90 Tab 3   • metformin ER (GLUCOPHAGE XR) 750 MG TABLET SR 24 HR Take 750 mg by mouth 2 times a day.     • aspirin 81 MG tablet Take 81 mg by mouth every day.     • famotidine (PEPCID) 20 MG TABS Take 20 mg by mouth every day.         Allergies  Other misc    ROS  All other systems were reviewed and found to be negative    History reviewed. No pertinent family history.    Social History     Social History   • Marital status:      Spouse name: N/A   • Number of children: N/A   • Years of education: N/A     Social History Main Topics   • Smoking  status: Former Smoker     Packs/day: 2.00     Years: 55.00     Types: Cigarettes     Quit date: 7/1/2013   • Smokeless tobacco: Never Used   • Alcohol use No   • Drug use: No   • Sexual activity: Not on file     Other Topics Concern   • Not on file     Social History Narrative   • No narrative on file       Physical Exam  Vitals  /72   Pulse 80   Temp 36.4 °C (97.6 °F)   Resp 16   Ht 1.829 m (6')   Wt 110 kg (242 lb 8.1 oz)   SpO2 96%   General: Well Developed, Well Nourished, no acute distress  HEENT: Normocephalic, atraumatic  Eyes: Anicteric, PERRLA, EOMI  RLE: Cavus, plantar fifth MTH ulcer, rigidly hammered lesser toes, hallux valgus  LLE: Cavus, plantar second or third MTH ulcer, hammered lesser toes  BLE: moderate swelling dorsally, warm and well perfused, dec sens stocking dist to above ankles.  Discoloration mid leg without ulcers.    Radiographs:  US-RACHEL SINGLE LEVEL BILAT   Final Result      EC-ECHOCARDIOGRAM COMPLETE W/ CONT   Final Result      US-EXTREMITY VENOUS LOWER BILAT   Final Result      DX-SHOULDER 2+ RIGHT   Final Result      1.  No acute findings.      2.  Severe degenerative change in the right acromioclavicular joint.      DX-FOOT-COMPLETE 3+ RIGHT   Final Result      1.  Lucency in the right fifth toe, possibly representing osteomyelitis. If clinically indicated, MRI could be performed for confirmation.      2.  Soft tissue edema within the forefoot.      DX-FOOT-COMPLETE 3+ LEFT   Final Result      1.  No definitive evidence for osteomyelitis.      2.  Forefoot deformities as described.      DX-CHEST-PORTABLE (1 VIEW)   Final Result      Bilateral interstitial opacities may represent interstitial edema or pneumonitis.      Mild cardiomegaly.      Atherosclerotic plaque.             Laboratory Values  Recent Labs      06/27/19   1447  06/28/19   0553   WBC  6.1  5.5   RBC  3.70*  3.71*   HEMOGLOBIN  11.2*  11.0*   HEMATOCRIT  37.3*  36.6*   MCV  100.8*  98.7*   MCH  30.3  29.6    MCHC  30.0*  30.1*   RDW  52.3*  52.1*   PLATELETCT  211  216   MPV  10.2  10.3     Recent Labs      06/27/19   1447  06/28/19   0553   SODIUM  141  143   POTASSIUM  4.6  4.3   CHLORIDE  102  105   CO2  29  27   GLUCOSE  110*  144*   BUN  27*  30*             Impression: Bilateral cavus foot, plantar right fifth ulcer, plantar left second/third ulcer    Plan:  NPO for bilateral Achilles lengthening, bilateral foot I&D, right fifth MTH excision, left second MTH excision  WBAT BLE postop in boots  Abx per ID, will send cultures    Operative intervention. Risks and benefits of surgery were discussed which include but are not limited to bleeding, infection, neurovascular damage, malunion, nonunion, instability, limb length discrepancy, DVT, PE, MI, Stroke and death. They understand these risks and wish to proceed.

## 2019-06-28 NOTE — PROGRESS NOTES
Telemetry Summary:    Measurements: .16/.12/.36       Rhythm: NSR/ST    Ectopy: PVC' & PAC's    Rate:     Landon Melchor

## 2019-06-28 NOTE — THERAPY
PT orders received and acknowledged. Pt to OR this afternoon for I&D. Plan to see pt for PT eval post-op as able.

## 2019-06-28 NOTE — ANESTHESIA PROCEDURE NOTES
Airway  Date/Time: 6/28/2019 4:04 PM  Performed by: JASON CLEMENT  Authorized by: JASON CLEMENT     Location:  OR  Urgency:  Elective  Indications for Airway Management:  Anesthesia  Spontaneous Ventilation: absent    Sedation Level:  Deep  Preoxygenated: Yes    Final Airway Type:  Supraglottic airway  Final Supraglottic Airway:  Standard LMA  SGA Size:  4  Number of Attempts at Approach:  1

## 2019-06-28 NOTE — PROGRESS NOTES
Infectious Disease Progress Note    Author: SALUD Petty Date & Time of service: 2019  2:32 PM    Chief Complaint:  Bilateral diabetic foot wounds    Interval History:  79-year-old male admitted on 2019 due to worsening bilateral foot wounds and worsening CHF.    -AF, WBC 5.5, no issue with antibiotics, denies pain, denies being short of breath.    Labs Reviewed, Medications Reviewed and Wound Reviewed.    Review of Systems:  Review of Systems   Constitutional: Negative for chills, fever and malaise/fatigue.   HENT: Negative for sore throat.    Eyes: Negative for blurred vision.   Respiratory: Negative for shortness of breath.    Cardiovascular: Positive for leg swelling. Negative for chest pain.   Gastrointestinal: Negative for abdominal pain, constipation, diarrhea, nausea and vomiting.   Genitourinary: Negative for dysuria.   Musculoskeletal: Negative for back pain, joint pain and myalgias.   Skin: Negative for rash.   Neurological: Positive for sensory change. Negative for headaches.   Psychiatric/Behavioral: The patient is not nervous/anxious.        Hemodynamics:  Temp (24hrs), Av.2 °C (97.2 °F), Min:36 °C (96.8 °F), Max:36.4 °C (97.5 °F)  Temperature: 36.3 °C (97.4 °F)  Pulse  Av.4  Min: 69  Max: 94   Blood Pressure : 115/72       Physical Exam:  Physical Exam   Constitutional: He is oriented to person, place, and time. He appears well-developed and well-nourished. No distress.   Obese   HENT:   Head: Normocephalic and atraumatic.   Mouth/Throat: Oropharynx is clear and moist. No oropharyngeal exudate.   Eyes: Pupils are equal, round, and reactive to light. Conjunctivae and EOM are normal. No scleral icterus.   Neck: Normal range of motion. Neck supple. No tracheal deviation present.   Cardiovascular: Normal rate, regular rhythm, normal heart sounds and intact distal pulses.    No murmur heard.  Pulmonary/Chest: Effort normal and breath sounds normal. No respiratory  distress. He has no wheezes. He has no rales.   3 L nasal cannula   Abdominal: Soft. Bowel sounds are normal. He exhibits no distension. There is no tenderness. There is no rebound and no guarding.   Musculoskeletal: He exhibits edema (Trace to +1 BLE) and deformity. He exhibits no tenderness.   Right fifth MTH plantar-dressing in place, but picture from 6/26/2019 reviewed-4 x 5.2 cm down to bone, wound bed pink with minimal yellow slough, mild erythematous surrounding tissue.    Left second/third MTH plantar-wound bed about the size of a nickel, covered in yellow slough, mild serous drainage, no odor, trace erythematous surrounding tissue.   Neurological: He is alert and oriented to person, place, and time. No cranial nerve deficit.   Skin: Skin is warm and dry. No rash noted. He is not diaphoretic. There is erythema.   Psychiatric: He has a normal mood and affect. Thought content normal.   Nursing note and vitals reviewed.      Meds:    Current Facility-Administered Medications:   •  cefepime  •  Respiratory Care per Protocol  •  linezolid  •  insulin regular **AND** Accu-Chek ACHS **AND** NOTIFY MD and PharmD **AND** glucose **AND** dextrose 10% bolus  •  senna-docusate **AND** polyethylene glycol/lytes **AND** magnesium hydroxide **AND** bisacodyl  •  heparin  •  acetaminophen  •  albuterol  •  aspirin  •  atorvastatin  •  carvedilol  •  famotidine  •  potassium chloride SA  •  furosemide    Labs:  Recent Labs      06/27/19   1447  06/28/19   0553   WBC  6.1  5.5   RBC  3.70*  3.71*   HEMOGLOBIN  11.2*  11.0*   HEMATOCRIT  37.3*  36.6*   MCV  100.8*  98.7*   MCH  30.3  29.6   RDW  52.3*  52.1*   PLATELETCT  211  216   MPV  10.2  10.3   NEUTSPOLYS  68.30  63.80   LYMPHOCYTES  18.00*  20.30*   MONOCYTES  9.30  11.60   EOSINOPHILS  3.60  3.40   BASOPHILS  0.50  0.50     Recent Labs      06/27/19   1447  06/28/19   0553   SODIUM  141  143   POTASSIUM  4.6  4.3   CHLORIDE  102  105   CO2  29  27   GLUCOSE  110*  144*  "  BUN  27*  30*     Recent Labs      06/27/19   1447  06/28/19   0553   CREATININE  1.46*  1.48*       Imaging:  No new imaging last 24 hours.      Micro:  Results     Procedure Component Value Units Date/Time    Blood Culture [[id number]] Collected:  06/25/19 1342    Order Status:  Completed Specimen:  Blood from Peripheral Updated:  06/26/19 0853     Significant Indicator NEG     Source BLD     Site PERIPHERAL     Culture Result No Growth  Note: Blood cultures are incubated for 5 days and  are monitored continuously.Positive blood cultures  are called to the RN and reported as soon as  they are identified.      Narrative:       Per Hospital Policy: Only change Specimen Src: to \"Line\" if  specified by physician order.  Right Hand    BLOOD CULTURE [[id number]] Collected:  06/25/19 1237    Order Status:  Completed Specimen:  Blood from Peripheral Updated:  06/26/19 0853     Significant Indicator NEG     Source BLD     Site PERIPHERAL     Culture Result No Growth  Note: Blood cultures are incubated for 5 days and  are monitored continuously.Positive blood cultures  are called to the RN and reported as soon as  they are identified.      Narrative:       Per Hospital Policy: Only change Specimen Src: to \"Line\" if  specified by physician order.  No site indicated    CULTURE WOUND W/ GRAM STAIN [[id number]]     Order Status:  Sent Specimen:  Wound from Left Foot           Assessment:  Active Hospital Problems    Diagnosis   • Acute systolic CHF (congestive heart failure) ([facility]) [I50.21]   • Diabetic foot infection ([facility]) [E11.628, L08.9]   • Venous stasis dermatitis of both lower extremities [I87.2]   • CKD (chronic kidney disease), stage III ([facility]) [N18.3]   • Edema [R60.9]   • COPD (chronic obstructive pulmonary disease) ([facility]) [J44.9]   • Type 2 diabetes mellitus with kidney complication, without long-term current use of insulin ([facility]) [E11.29]       Plan:  Bilateral diabetic foot ulcers  Afebrile  No leukocytosis  Blood " cultures on 6/25 NGTD  Plan for bilateral GSR, right fifth MTH excision and left second MTH excision-scheduled for 6/28; however waiting on cardiology evaluation.  Being followed by LPS  Transition from IV Unasyn to cefepime due to salt loading  Changed IV cefepime dosing to 2 g every 12 hours, continue for now  Overall duration of antibiotics will be based upon surgical intervention and cultures    Uncontrolled type 2 diabetes  Hemoglobin A1c 10.3% on 6/26/2019  Keep blood sugars under 150 to control infection and promote wound healing   this a.m.    CKD  CrCl~ 50-55  Renally adjust antibiotics as needed  Continue to monitor closely    CHF  BNP 1345 today

## 2019-06-28 NOTE — ANESTHESIA PREPROCEDURE EVALUATION
Relevant Problems   (+) Acute systolic CHF (congestive heart failure) ([facility])   (+) CAD (coronary artery disease)   (+) COPD (chronic obstructive pulmonary disease) ([facility])   (+) CVA, old, facial weakness   (+) Diabetic foot infection ([facility])   (+) PAF (paroxysmal atrial fibrillation) ([facility])   (+) Type 2 diabetes mellitus with kidney complication, without long-term current use of insulin ([facility])       Physical Exam    Anesthesia Plan    ASA 4   ASA physical status 4 criteria: severe reduction of ejection fractions    Plan - general       Airway plan will be LMA        Induction: intravenous    Postoperative Plan: Postoperative administration of opioids is intended.    Pertinent diagnostic labs and testing reviewed    Informed Consent:    Anesthetic plan and risks discussed with patient.    Use of blood products discussed with: patient whom consented to blood products.

## 2019-06-28 NOTE — PROGRESS NOTES
2 RN skin check complete.     Devices in place Waffle Mattress, Q2hr Turns, BL Heel Boots, Foam Pads to BL Elbows, Adhesive Foam to Right Knee, Barrier Cream, Interdry to ABD Folds.    Wound consult placed ALREADY ORDERED.    2-RN skin assessment completed this shift with RY May.  Skin was assessed for abnormalities head to toe and noted in detail skin note.      FACE/NECK: Scab on Forehead, otherwise Intact    BACK of HEAD: Itnact    EARS: BL-Ears Blanching-Intact     CHEST: Intact    ABDOMEN: Intact    BACK: Intact    BUTTOCKS/SACRUM: Red & Blanching    GROIN:  Excoriated Reddened-Skin Intact     ARMS: Left Elbow Red Blanching Scab, Right Elbow Fragile, Swollen, Reddened, Foam Pad Placed on BL Elbows.      LEGS: Scattered Scabs on BL LE,  BLLE reddened, flaky, dry, cellulitis in appearance     Right Knee:  Wound/Abraisions-cleansed and new foam dressing applied.      FEET: 2nd Digit Left Foot, dry flaky in appearance not open, skin intact   Right Plantar Foot: Unstageable Diabetic Foot Ulcer  Left Plantar Foot:  Stage II Diabetic Foot Ulcer    This completes the 2-RN skin assessment.      Landon Melchor

## 2019-06-28 NOTE — PROGRESS NOTES
2 RN skin check complete w/ RY Soto.   Devices in place foam right knee, mepilex elbows, oxygen tubing, heel float boots.  Skin assessed under devices pink, blanching.  Confirmed pressure ulcers found on NA.  New potential pressure ulcers noted on NA. Wound consult placed YES.  Diabetic foot ulcers on plantar surface of right and left feet, open, draining, covered with foam dressing.  Darkened region on distal 2nd and third left digits.  Ears red, blanching. Generalized bruising. Groin and buttocks red w/ IAD.  The following interventions in place waffle mattress, heel float boots, foam dressings, barrier wipes and barrier cream, Q2 turns.

## 2019-06-29 PROBLEM — Z66 DNR (DO NOT RESUSCITATE): Status: ACTIVE | Noted: 2019-06-29

## 2019-06-29 LAB
ANION GAP SERPL CALC-SCNC: 12 MMOL/L (ref 0–11.9)
BASOPHILS # BLD AUTO: 0.3 % (ref 0–1.8)
BASOPHILS # BLD: 0.02 K/UL (ref 0–0.12)
BNP SERPL-MCNC: 994 PG/ML (ref 0–100)
BUN SERPL-MCNC: 32 MG/DL (ref 8–22)
CALCIUM SERPL-MCNC: 8.9 MG/DL (ref 8.5–10.5)
CHLORIDE SERPL-SCNC: 105 MMOL/L (ref 96–112)
CO2 SERPL-SCNC: 27 MMOL/L (ref 20–33)
CREAT SERPL-MCNC: 1.49 MG/DL (ref 0.5–1.4)
EOSINOPHIL # BLD AUTO: 0.15 K/UL (ref 0–0.51)
EOSINOPHIL NFR BLD: 2.2 % (ref 0–6.9)
ERYTHROCYTE [DISTWIDTH] IN BLOOD BY AUTOMATED COUNT: 51.9 FL (ref 35.9–50)
GLUCOSE BLD-MCNC: 142 MG/DL (ref 65–99)
GLUCOSE BLD-MCNC: 178 MG/DL (ref 65–99)
GLUCOSE BLD-MCNC: 237 MG/DL (ref 65–99)
GLUCOSE BLD-MCNC: 239 MG/DL (ref 65–99)
GLUCOSE SERPL-MCNC: 198 MG/DL (ref 65–99)
GRAM STN SPEC: NORMAL
GRAM STN SPEC: NORMAL
HCT VFR BLD AUTO: 34.8 % (ref 42–52)
HGB BLD-MCNC: 10.6 G/DL (ref 14–18)
IMM GRANULOCYTES # BLD AUTO: 0.02 K/UL (ref 0–0.11)
IMM GRANULOCYTES NFR BLD AUTO: 0.3 % (ref 0–0.9)
LYMPHOCYTES # BLD AUTO: 0.85 K/UL (ref 1–4.8)
LYMPHOCYTES NFR BLD: 12.2 % (ref 22–41)
MCH RBC QN AUTO: 30.2 PG (ref 27–33)
MCHC RBC AUTO-ENTMCNC: 30.5 G/DL (ref 33.7–35.3)
MCV RBC AUTO: 99.1 FL (ref 81.4–97.8)
MONOCYTES # BLD AUTO: 0.85 K/UL (ref 0–0.85)
MONOCYTES NFR BLD AUTO: 12.2 % (ref 0–13.4)
NEUTROPHILS # BLD AUTO: 5.06 K/UL (ref 1.82–7.42)
NEUTROPHILS NFR BLD: 72.8 % (ref 44–72)
NRBC # BLD AUTO: 0 K/UL
NRBC BLD-RTO: 0 /100 WBC
PLATELET # BLD AUTO: 204 K/UL (ref 164–446)
PMV BLD AUTO: 10.4 FL (ref 9–12.9)
POTASSIUM SERPL-SCNC: 4.5 MMOL/L (ref 3.6–5.5)
RBC # BLD AUTO: 3.51 M/UL (ref 4.7–6.1)
RHODAMINE-AURAMINE STN SPEC: NORMAL
RHODAMINE-AURAMINE STN SPEC: NORMAL
SIGNIFICANT IND 70042: NORMAL
SITE SITE: NORMAL
SODIUM SERPL-SCNC: 144 MMOL/L (ref 135–145)
SOURCE SOURCE: NORMAL
WBC # BLD AUTO: 7 K/UL (ref 4.8–10.8)

## 2019-06-29 PROCEDURE — 700111 HCHG RX REV CODE 636 W/ 250 OVERRIDE (IP): Performed by: INTERNAL MEDICINE

## 2019-06-29 PROCEDURE — L4398 FOOT DROP SPLINT PRE OTS: HCPCS

## 2019-06-29 PROCEDURE — 99233 SBSQ HOSP IP/OBS HIGH 50: CPT | Mod: 25 | Performed by: INTERNAL MEDICINE

## 2019-06-29 PROCEDURE — 93005 ELECTROCARDIOGRAM TRACING: CPT | Performed by: INTERNAL MEDICINE

## 2019-06-29 PROCEDURE — 80048 BASIC METABOLIC PNL TOTAL CA: CPT

## 2019-06-29 PROCEDURE — 82962 GLUCOSE BLOOD TEST: CPT | Mod: 91

## 2019-06-29 PROCEDURE — 770020 HCHG ROOM/CARE - TELE (206)

## 2019-06-29 PROCEDURE — 99222 1ST HOSP IP/OBS MODERATE 55: CPT | Performed by: INTERNAL MEDICINE

## 2019-06-29 PROCEDURE — 700105 HCHG RX REV CODE 258: Performed by: NURSE PRACTITIONER

## 2019-06-29 PROCEDURE — 99233 SBSQ HOSP IP/OBS HIGH 50: CPT | Performed by: INTERNAL MEDICINE

## 2019-06-29 PROCEDURE — 93010 ELECTROCARDIOGRAM REPORT: CPT | Performed by: INTERNAL MEDICINE

## 2019-06-29 PROCEDURE — 99497 ADVNCD CARE PLAN 30 MIN: CPT | Performed by: INTERNAL MEDICINE

## 2019-06-29 PROCEDURE — 700102 HCHG RX REV CODE 250 W/ 637 OVERRIDE(OP): Performed by: INTERNAL MEDICINE

## 2019-06-29 PROCEDURE — 83880 ASSAY OF NATRIURETIC PEPTIDE: CPT

## 2019-06-29 PROCEDURE — 700111 HCHG RX REV CODE 636 W/ 250 OVERRIDE (IP): Performed by: NURSE PRACTITIONER

## 2019-06-29 PROCEDURE — A9270 NON-COVERED ITEM OR SERVICE: HCPCS | Performed by: NURSE PRACTITIONER

## 2019-06-29 PROCEDURE — 97165 OT EVAL LOW COMPLEX 30 MIN: CPT

## 2019-06-29 PROCEDURE — 97161 PT EVAL LOW COMPLEX 20 MIN: CPT

## 2019-06-29 PROCEDURE — A9270 NON-COVERED ITEM OR SERVICE: HCPCS | Performed by: INTERNAL MEDICINE

## 2019-06-29 PROCEDURE — 92610 EVALUATE SWALLOWING FUNCTION: CPT

## 2019-06-29 PROCEDURE — 700105 HCHG RX REV CODE 258

## 2019-06-29 PROCEDURE — 36415 COLL VENOUS BLD VENIPUNCTURE: CPT

## 2019-06-29 PROCEDURE — 85025 COMPLETE CBC W/AUTO DIFF WBC: CPT

## 2019-06-29 PROCEDURE — 700102 HCHG RX REV CODE 250 W/ 637 OVERRIDE(OP): Performed by: NURSE PRACTITIONER

## 2019-06-29 RX ORDER — FUROSEMIDE 10 MG/ML
20 INJECTION INTRAMUSCULAR; INTRAVENOUS
Status: DISCONTINUED | OUTPATIENT
Start: 2019-06-29 | End: 2019-06-30

## 2019-06-29 RX ORDER — INSULIN GLARGINE 100 [IU]/ML
6 INJECTION, SOLUTION SUBCUTANEOUS EVERY EVENING
Status: DISCONTINUED | OUTPATIENT
Start: 2019-06-29 | End: 2019-06-30

## 2019-06-29 RX ORDER — SODIUM CHLORIDE 9 MG/ML
INJECTION, SOLUTION INTRAVENOUS
Status: COMPLETED
Start: 2019-06-29 | End: 2019-06-29

## 2019-06-29 RX ADMIN — INSULIN HUMAN 3 UNITS: 100 INJECTION, SOLUTION PARENTERAL at 17:06

## 2019-06-29 RX ADMIN — POTASSIUM CHLORIDE 10 MEQ: 1500 TABLET, EXTENDED RELEASE ORAL at 05:14

## 2019-06-29 RX ADMIN — SENNOSIDES, DOCUSATE SODIUM 2 TABLET: 50; 8.6 TABLET, FILM COATED ORAL at 16:56

## 2019-06-29 RX ADMIN — FAMOTIDINE 20 MG: 20 TABLET ORAL at 05:13

## 2019-06-29 RX ADMIN — INSULIN GLARGINE 6 UNITS: 100 INJECTION, SOLUTION SUBCUTANEOUS at 18:01

## 2019-06-29 RX ADMIN — CEFEPIME 2 G: 2 INJECTION, POWDER, FOR SOLUTION INTRAVENOUS at 16:57

## 2019-06-29 RX ADMIN — CEFEPIME 2 G: 2 INJECTION, POWDER, FOR SOLUTION INTRAVENOUS at 05:18

## 2019-06-29 RX ADMIN — CARVEDILOL 3.12 MG: 3.12 TABLET, FILM COATED ORAL at 16:56

## 2019-06-29 RX ADMIN — HEPARIN SODIUM 5000 UNITS: 5000 INJECTION, SOLUTION INTRAVENOUS; SUBCUTANEOUS at 20:50

## 2019-06-29 RX ADMIN — ATORVASTATIN CALCIUM 20 MG: 20 TABLET, FILM COATED ORAL at 05:14

## 2019-06-29 RX ADMIN — LINEZOLID 600 MG: 600 TABLET, FILM COATED ORAL at 16:57

## 2019-06-29 RX ADMIN — SODIUM CHLORIDE: 9 INJECTION, SOLUTION INTRAVENOUS at 17:15

## 2019-06-29 RX ADMIN — ASPIRIN 81 MG 81 MG: 81 TABLET ORAL at 05:14

## 2019-06-29 RX ADMIN — HEPARIN SODIUM 5000 UNITS: 5000 INJECTION, SOLUTION INTRAVENOUS; SUBCUTANEOUS at 05:14

## 2019-06-29 RX ADMIN — INSULIN HUMAN 2 UNITS: 100 INJECTION, SOLUTION PARENTERAL at 05:09

## 2019-06-29 RX ADMIN — HEPARIN SODIUM 5000 UNITS: 5000 INJECTION, SOLUTION INTRAVENOUS; SUBCUTANEOUS at 12:21

## 2019-06-29 RX ADMIN — FUROSEMIDE 20 MG: 10 INJECTION, SOLUTION INTRAMUSCULAR; INTRAVENOUS at 16:56

## 2019-06-29 RX ADMIN — LINEZOLID 600 MG: 600 TABLET, FILM COATED ORAL at 05:14

## 2019-06-29 ASSESSMENT — COGNITIVE AND FUNCTIONAL STATUS - GENERAL
SUGGESTED CMS G CODE MODIFIER DAILY ACTIVITY: CK
CLIMB 3 TO 5 STEPS WITH RAILING: TOTAL
MOBILITY SCORE: 8
STANDING UP FROM CHAIR USING ARMS: TOTAL
EATING MEALS: A LITTLE
MOVING FROM LYING ON BACK TO SITTING ON SIDE OF FLAT BED: UNABLE
DAILY ACTIVITIY SCORE: 15
PERSONAL GROOMING: A LITTLE
SUGGESTED CMS G CODE MODIFIER MOBILITY: CM
HELP NEEDED FOR BATHING: A LOT
TURNING FROM BACK TO SIDE WHILE IN FLAT BAD: A LOT
TOILETING: A LOT
DRESSING REGULAR LOWER BODY CLOTHING: A LOT
DRESSING REGULAR UPPER BODY CLOTHING: A LITTLE
WALKING IN HOSPITAL ROOM: TOTAL
MOVING TO AND FROM BED TO CHAIR: A LOT

## 2019-06-29 ASSESSMENT — ENCOUNTER SYMPTOMS
MYALGIAS: 0
DEPRESSION: 0
VOMITING: 0
FEVER: 0
FALLS: 0
HEARTBURN: 0
SORE THROAT: 0
COUGH: 1
NAUSEA: 0
EYE PAIN: 0
NECK PAIN: 0
SHORTNESS OF BREATH: 0
PALPITATIONS: 0
CONSTIPATION: 0
ABDOMINAL PAIN: 0
BACK PAIN: 0
PND: 0
HEADACHES: 0
WEAKNESS: 0
TINGLING: 0
SENSORY CHANGE: 1
DIARRHEA: 0
DIZZINESS: 0
WHEEZING: 1
CHILLS: 0
NERVOUS/ANXIOUS: 0
BLURRED VISION: 0

## 2019-06-29 ASSESSMENT — LIFESTYLE VARIABLES: SUBSTANCE_ABUSE: 0

## 2019-06-29 ASSESSMENT — ACTIVITIES OF DAILY LIVING (ADL): TOILETING: INDEPENDENT

## 2019-06-29 ASSESSMENT — GAIT ASSESSMENTS: GAIT LEVEL OF ASSIST: UNABLE TO PARTICIPATE

## 2019-06-29 NOTE — OR NURSING
Pt A&OX4. Pt sleepy but wakes to name and answers questions appropriately. VSS. Pt in sinus rhythm. On 2 L of oxygen via nasal cannula. Bilateral feet wrapped in surgical dressings post op, CDI. BLE elevated on pillow with heel float boots on. Ice packs in place. Pt can wiggle bilateral toes but does state baseline numbness present. Cool lower extremities with less than 3 second capillary refill and 3+ pitting edema. Pt denies pain and declined medication for pain. Declined sips of water. Traction notified of pt needing  boot for offloading, will fit pt once arrives back to room. Report called to RY Roberts.

## 2019-06-29 NOTE — PROGRESS NOTES
Spoke with Hospitalist Dr. Hahn pt bp 92/60 & 88/58 HR 80-90 will hold 20mg IVP Lasix.    Landon Melchor

## 2019-06-29 NOTE — PROGRESS NOTES
2 RN skin check complete.      Devices in place Waffle Mattress, Q2hr Turns, BL Heel Boots, Foam Pads to BL Elbows, Adhesive Foam to Right Knee, Barrier Cream, Interdry to ABD Folds.     Wound consult placed ALREADY ORDERED, Patient just returned from  Feet Operation.  See Orders for Dressing Changes.       2-RN skin assessment completed this shift with RY May.  Skin was assessed for abnormalities head to toe and noted in detail skin note.       FACE/NECK: Scab on Forehead, otherwise Intact     BACK of HEAD: Itnact     EARS: BL-Ears Blanching-Intact      CHEST: Intact     ABDOMEN: Intact-Abdominal Hernia      BACK: Intact     BUTTOCKS/SACRUM: Red & Blanching     GROIN:  Excoriated Reddened-Skin Intact      ARMS: Left Elbow Red Blanching Scab, Right Elbow Fragile-Skin Tear, Swollen, Reddened, Foam Pad Placed on BL Elbows.       LEGS: Scattered Scabs on BL LE,  BLLE reddened, flaky, dry, cellulitis in appearance       Right Knee:  Wound/Abraisions-cleansed and new foam dressing applied.       FEET: 2nd Digit Left Foot, dry flaky in appearance not open, skin intact   Right Plantar Foot: Unstageable Diabetic Foot Ulcer  Left Plantar Foot:  Stage II Diabetic Foot Ulcer    New Post Op Dressings.  Did not remove.       This completes the 2-RN skin assessment.       Landon Melchor

## 2019-06-29 NOTE — ANESTHESIA TIME REPORT
Anesthesia Start and Stop Event Times     Date Time Event    6/28/2019 1548 Anesthesia Start     1727 Anesthesia Stop        Responsible Staff  06/28/19    Name Role Begin End    Ren Howell M.D. Anesth 1548 1727        Preop Diagnosis (Free Text):  Pre-op Diagnosis     DIABETIC FOOT ULCERS        Preop Diagnosis (Codes):  Diagnosis Information     Diagnosis Code(s):         Post op Diagnosis  Diabetic foot (HCC)      Premium Reason  A. 3PM - 7AM    Comments:

## 2019-06-29 NOTE — PROGRESS NOTES
Assumed care at 0700. Bedside report received from Landon. Patient's chart and MAR reviewed. Pt denies pain at this time. Pt is A & O 4. Patient was updated on plan of care for the day. Questions answered and concerns addressed.  Pt denies any additional needs at this time. White board updated. Call light, phone and personal belongings within reach.

## 2019-06-29 NOTE — ANESTHESIA QCDR
2019 [facility] Clinical Data Registry (for Quality Improvement)     Postoperative nausea/vomiting risk protocol (Adult = 18 yrs and Pediatric 3-17 yrs)- (430 and 463)  General inhalation anesthetic (NOT TIVA) with PONV risk factors: No  Provision of anti-emetic therapy with at least 2 different classes of agents: N/A  Patient DID NOT receive anti-emetic therapy and reason is documented in Medical Record: N/A    Multimodal Pain Management- (AQI59)  Patient undergoing Elective Surgery (i.e. Outpatient, or ASC, or Prescheduled Surgery prior to Hospital Admission): No  Use of Multimodal Pain Management, two or more drugs and/or interventions, NOT including systemic opioids: N/A  Exception: Documented allergy to multiple classes of analgesics: N/A    PACU assessment of acute postoperative pain prior to Anesthesia Care End- Applies to Patients Age = 18- (ABG7)  Initial PACU pain score is which of the following: < 7/10  Patient unable to report pain score: N/A    Post-anesthetic transfer of care checklist/protocol to PACU/ICU- (426 and 427)  Upon conclusion of case, patient transferred to which of the following locations: PACU/Non-ICU  Use of transfer checklist/protocol: Yes  Exclusion: Service Performed in Patient Hospital Room (and thus did not require transfer): N/A    PACU Reintubation- (AQI31)  General anesthesia requiring endotracheal intubation (ETT) along with subsequent extubation in OR or PACU: Yes  Required reintubation in the PACU: No   Extubation was a planned trial documented in the medical record prior to removal of the original airway device:  N/A    Unplanned admission to ICU related to anesthesia service up through end of PACU care- (MD51)  Unplanned admission to ICU (not initially anticipated at anesthesia start time): No

## 2019-06-29 NOTE — THERAPY
"Occupational Therapy Evaluation completed.   Functional Status:  Pt presents to skilled OT services following bilateral gastrous soleus recession, metatarsal head resection of Rt 5th and Lt second and third, tenotomy, WBAT BLE's with CAM boot. Pt received sitting up eob, pt provided with extensive education in regards to need for CAM boot, verbalizing \"they're heavy and clumsy, has doctor worn them ever\" with education and encouragement, pt was agreeable to don CAM boot and attempt standing. Pt required total assist to s/u and don bilateral cam boots, STS eob with min to mod a using FWW from a raised height of the bed, min a for sidestepping eob with FWW with vc's and mod a to return back to bed. Pt demonstrates decreased safety awareness, insight into current deficits, impaired balance, limited knowledge of post op precautions and ADL modifications, strength and endurance deficits. Pt will benefit from acute skilled OT services while in house and post acute skilled therapy recommended.   Plan of Care: Will benefit from Occupational Therapy 3 times per week  Discharge Recommendations:  Equipment: Will Continue to Assess for Equipment Needs. Post-acute therapy Recommend post-acute placement for additional occupational therapy services prior to discharge home. Patient can tolerate post-acute therapies at a 5x/week frequency.       See \"Rehab Therapy-Acute\" Patient Summary Report for complete documentation.    "

## 2019-06-29 NOTE — THERAPY
"78 y/o mlae adm for BLE edema x 3 weeks. s/p  right 5th and left 2nd and 3rd met head resection. Pt to be WBAT with CAM boot . Pt  with bilateral feet pain restricting placement of cam boot today. MHe was at a rehab facility as per his report not too long ago and then had hh services which ceased 3 weeks PTA. Pt requires max assist for bed mobility at this time and unable to stand. Acute PT to address functional mobility, stength , balance and functional [id number] for pt to achieve a higher level of function    Physical Therapy Evaluation completed.   Bed Mobility:  Supine to Sit: Maximal Assist  Transfers: Sit to Stand: Unable to Participate  Gait: Level Of Assist: Unable to Participate with Front-Wheel Walker       Plan of Care: Will benefit from Physical Therapy 3 times per week  Discharge Recommendations: Equipment: Will Continue to Assess for Equipment Needs. Post-acute therapy Discharge to a transitional care facility for continued skilled therapy services.    See \"Rehab Therapy-Acute\" Patient Summary Report for complete documentation.     "

## 2019-06-29 NOTE — PROGRESS NOTES
[facility] Medicine Daily Progress Note    Date of Service  6/29/2019    Chief Complaint  79 y.o. male admitted 6/25/2019 with BLE swelling and diabetic foot infection.    Hospital Course    H/O COPD, CAD, HTN, DM, HLD.  Patient follows with outpatient wound care for chronic, nonhealing bilateral diabetic foot wounds.  Presented to ED after developing worsening erythema and swelling of BLE as well as purulent drainage from foot wounds.  Started on antibiotics.  ID and LPS consulted.  Also found to have mildly elevated troponin with stable EKG.  Suspect volume overload with elevated BNP and edema noted on chest x-ray.  Continue diuresis.  Echo revealing new CHF with EF 35%.  Cardiology consulted.  Pending surgical I&D.  Will need cardiac clearance prior to surgery.          Interval Problem Update  6/26:  Bilateral foot wounds noted with purulent drainage.  Dressing intact.  Denies pain.  Improvement of BLE edema with diuresis, although noted to have stasis dermatitis.  Wheezing noted on ausculation.  Denies shortness of breath.  Pro curt negative.  Bilateral opacities noted on CXR.  Echo pending.   6/27:  Echo reveals EF 35%.  New HF diagnosis.  Cardiology consulted.  Edema overall improved with diuresis.  Troponin trending down.  No chest pain.  Pending surgical I&D.  Will need cardiac clearance prior.  Hgb A1c 10.3.  Started on SSI.     6/28.  Patient has been n.p.o. to prepare for surgery today.  Patient agreed with surgery.  Patient's breathing condition improved after diuretics.  Patient complains of local pain. Patient's pain is local, 4-6/10, intermittent and does not radiate to other location, sharp and with some tingling. Can be controlled by pain meds. Dressing in place.  6/29.  Patient tolerated procedure very well.  Patient was noticed to have been choking on food in the morning.  Speech evaluation recommended.  Recommend cautiously with oral intake until speech evaluation.  Recommend current status as  n.p.o. and sips with medication.  Patient's lower extremity pain still present. Patient's pain is local, 6-8/10, intermittent and does not radiate to other location, sharp and with some tingling. Can be controlled by pain meds. Dressing in place.      Consultants/Specialty  ID  LPS  Cardiology    Code Status  DNR    Disposition  SNF    Review of Systems  Review of Systems   Constitutional: Negative for chills and fever.   HENT: Negative for congestion, ear pain, hearing loss and nosebleeds.    Eyes: Negative for blurred vision and pain.   Respiratory: Positive for cough and wheezing. Negative for shortness of breath.    Cardiovascular: Positive for leg swelling. Negative for chest pain, palpitations and PND.   Gastrointestinal: Negative for constipation, diarrhea, heartburn, nausea and vomiting.   Genitourinary: Negative for dysuria, frequency and hematuria.   Musculoskeletal: Negative for falls, myalgias and neck pain.   Skin: Negative for itching and rash.        BLE swelling and erythema.  Bilateral foot wounds.    Neurological: Negative for dizziness, tingling, weakness and headaches.   Psychiatric/Behavioral: Negative for depression and substance abuse.        Physical Exam  Temp:  [36 °C (96.8 °F)-37.1 °C (98.8 °F)] 36.8 °C (98.2 °F)  Pulse:  [77-92] 88  Resp:  [11-20] 18  BP: ()/(55-70) 126/70  SpO2:  [89 %-100 %] 94 %    Physical Exam   Constitutional: He is oriented to person, place, and time. He appears well-developed and well-nourished.   Obese male.    HENT:   Head: Normocephalic and atraumatic.   Right Ear: External ear normal.   Left Ear: External ear normal.   Eyes: Pupils are equal, round, and reactive to light. Conjunctivae and EOM are normal. Right eye exhibits no discharge. Left eye exhibits no discharge.   Neck: Normal range of motion. Neck supple. No thyromegaly present.   Cardiovascular: Normal rate, regular rhythm and normal heart sounds.  Exam reveals no gallop and no friction rub.     BLE edema   Pulmonary/Chest: Effort normal. No stridor. No respiratory distress. He has decreased breath sounds in the right lower field and the left lower field. He has wheezes in the right lower field, the left upper field, the left middle field and the left lower field. He exhibits no tenderness.   Patient's cough is weak and patient showing signs of aspiration   Abdominal: Soft. Bowel sounds are normal. He exhibits no mass. There is no tenderness. There is no rebound.   Musculoskeletal: Normal range of motion. He exhibits edema. He exhibits no tenderness or deformity.   Lymphadenopathy:     He has no cervical adenopathy.   Neurological: He is alert and oriented to person, place, and time. He displays normal reflexes.   Skin: Skin is warm and dry. No rash noted. He is not diaphoretic. There is erythema.   Stasis dermatitis noted to BLE   Bilateral diabetic foot wounds with purulent drainage.  Dressing intact.    Psychiatric: He has a normal mood and affect.   Nursing note and vitals reviewed.      Fluids    Intake/Output Summary (Last 24 hours) at 06/29/19 1414  Last data filed at 06/29/19 0250   Gross per 24 hour   Intake              540 ml   Output              265 ml   Net              275 ml       Laboratory  Recent Labs      06/27/19   1447  06/28/19   0553  06/29/19   0355   WBC  6.1  5.5  7.0   RBC  3.70*  3.71*  3.51*   HEMOGLOBIN  11.2*  11.0*  10.6*   HEMATOCRIT  37.3*  36.6*  34.8*   MCV  100.8*  98.7*  99.1*   MCH  30.3  29.6  30.2   MCHC  30.0*  30.1*  30.5*   RDW  52.3*  52.1*  51.9*   PLATELETCT  211  216  204   MPV  10.2  10.3  10.4     Recent Labs      06/27/19   1447  06/28/19   0553  06/29/19   0355   SODIUM  141  143  144   POTASSIUM  4.6  4.3  4.5   CHLORIDE  102  105  105   CO2  29  27  27   GLUCOSE  110*  144*  198*   BUN  27*  30*  32*   CREATININE  1.46*  1.48*  1.49*   CALCIUM  9.2  8.9  8.9         Recent Labs      06/27/19   0303  06/28/19   0553  06/29/19   0355   BNPBTYPENAT  811*   1345*  994*           Imaging  US-RACHEL SINGLE LEVEL BILAT   Final Result      EC-ECHOCARDIOGRAM COMPLETE W/ CONT   Final Result      US-EXTREMITY VENOUS LOWER BILAT   Final Result      DX-SHOULDER 2+ RIGHT   Final Result      1.  No acute findings.      2.  Severe degenerative change in the right acromioclavicular joint.      DX-FOOT-COMPLETE 3+ RIGHT   Final Result      1.  Lucency in the right fifth toe, possibly representing osteomyelitis. If clinically indicated, MRI could be performed for confirmation.      2.  Soft tissue edema within the forefoot.      DX-FOOT-COMPLETE 3+ LEFT   Final Result      1.  No definitive evidence for osteomyelitis.      2.  Forefoot deformities as described.      DX-CHEST-PORTABLE (1 VIEW)   Final Result      Bilateral interstitial opacities may represent interstitial edema or pneumonitis.      Mild cardiomegaly.      Atherosclerotic plaque.              Assessment/Plan  Dysphagia:  -Patient has very weak cough during the day however patient said this is his baseline.  -Noticed significant risk of aspiration evaluated by bedside nurse, me, and speech therapist.  -Per speech therapist recommend patient to have fees test however patient refused.  -Further discussion about the risk of aspiration patient would like to take the risk and eat for pleasure.  -Patient also wants to change CODE STATUS to DNR.  -Patient currently is competent to make medical decision.  We will respect patient wishes and start patient on safest diet currently as dysphagia II with nectar thick.      DNR  -Total time spent on advanced care planning, excluding time spent on daily care: 16 minutes.    1st 30 minutes [id number]        I discussed extensively with patient and patient's family is regarding code status and plan of care. We also discussed advanced care planning including diagnosis, prognosis, plan of care, risks and benefits of any therapies that could be offered, as well as alternatives including palliation  and hospice, as appropriate. My discussion is summarized above in detail. Confirmed with DNR    Acute systolic CHF (congestive heart failure) (HCC)   Assessment & Plan    Echo reveals EF of 35%  New diagnosis  Known CAD with multivessel disease  Mild volume overload undergoing diuresis  Continue coreg and lasix.  Cardiology consulted.  Pending cardiology further recommendation.  Patient currently kidney function not optimal, we will hold of ACE inhibitor for now and monitor patient's kidney function.     Diabetic foot infection (HCC)   Assessment & Plan    History of chronic diabetic wounds to bilateral feet.  Has been following with outpatient wound clinic  Recent worsening of wounds with purulent drainage.   Cultures pending.   ID following.  Continue cefepime and linezolid  He found to have possible right foot osteomyelitis on x-ray.  LPS following  Pending surgical I&D.   Awaiting cardiac clearance due to new CHF diagnosis.  Patient currently have no chest pain     Venous stasis dermatitis of both lower extremities   Assessment & Plan    Secondary to chronic edema  Continue lasix.  Skin care.      Macrocytosis   Assessment & Plan    TSH, vitamin B12 and folic acid wnl.     Elevated troponin   Assessment & Plan    He found to have mildly elevated troponin.  He denies any symptoms of chest pain.  EKG stable  ? Stress induced secondary to volume overload  Echo shows EF 35%.  Continue lasix.   Cardiology consulted.      CKD (chronic kidney disease), stage III ([facility])- (present on admission)   Assessment & Plan    History of chronic kidney disease.  Currently renal functions are at the baseline.  Avoid nephrotoxins.  Continue to monitor  Hold off ACE inhibitor for now until clinically patient is more euvolemic and then challenge with small dose     CAD (coronary artery disease)- (present on admission)   Assessment & Plan    Hx of  Continue lipitor and ASA and beta-blocker currently no chest pain.      Mixed  hyperlipidemia- (present on admission)   Assessment & Plan    Continue lipitor.      Edema- (present on admission)   Assessment & Plan    Hx of chronic BLE edema.  Worse prior to admit.  Secondary to CHF.  BNP elevated.   LE US negative for DVT  RACHEL negative for arterial disease.   Pending further recommendation by cardiologist.  Patient still showing lower extremity edema.  Cont with lasix     COPD (chronic obstructive pulmonary disease) (HCC)- (present on admission)   Assessment & Plan    Wheezing noted on exam  Denies shortness of breath  Pro curt negative  Continue RT protocol, BT, and supplemental o2.      Type 2 diabetes mellitus with kidney complication, without long-term current use of insulin (HCC)- (present on admission)   Assessment & Plan    Uncontrolled  A1c 10.3  Hold metformin  Continue SSI while in the hospital   Diabetic diet and accuchecks.      Essential hypertension, benign- (present on admission)   Assessment & Plan    Controlled.  Continue coreg.             VTE prophylaxis: Heparin      Current Facility-Administered Medications:   •  cefepime (MAXIPIME) 2 g in  mL IVPB, 2 g, Intravenous, Q12HRS, Ryanne Martinez A.P.R.N., Stopped at 06/29/19 0548  •  Respiratory Care per Protocol, , Nebulization, Continuous RT, Jose Monterroso A.P.R.N.  •  linezolid (ZYVOX) tablet 600 mg, 600 mg, Oral, Q12HRS, Jose Monterroso A.P.R.N., 600 mg at 06/29/19 0514  •  senna-docusate (PERICOLACE or SENOKOT S) 8.6-50 MG per tablet 2 Tab, 2 Tab, Oral, BID, 2 Tab at 06/27/19 6368 **AND** polyethylene glycol/lytes (MIRALAX) PACKET 1 Packet, 1 Packet, Oral, QDAY PRN **AND** magnesium hydroxide (MILK OF MAGNESIA) suspension 30 mL, 30 mL, Oral, QDAY PRN **AND** bisacodyl (DULCOLAX) suppository 10 mg, 10 mg, Rectal, QDAY PRN, Andi Winters M.D.  •  heparin injection 5,000 Units, 5,000 Units, Subcutaneous, Q8HRS, Andi Winters M.D., 5,000 Units at 06/29/19 1221  •  acetaminophen (TYLENOL) tablet 650 mg,  650 mg, Oral, Q6HRS PRN, Andi Winters M.D.  •  albuterol (PROVENTIL) 2.5mg/0.5ml nebulizer solution 2.5 mg, 2.5 mg, Nebulization, Q4HRS PRN, Andi Winters M.D.  •  aspirin (ASA) chewable tab 81 mg, 81 mg, Oral, DAILY, Anid Winters M.D., 81 mg at 06/29/19 0514  •  atorvastatin (LIPITOR) tablet 20 mg, 20 mg, Oral, DAILY, Andi Winters M.D., 20 mg at 06/29/19 0514  •  carvedilol (COREG) tablet 3.125 mg, 3.125 mg, Oral, BID WITH MEALS, Andi Winters M.D., Stopped at 06/29/19 0730  •  famotidine (PEPCID) tablet 20 mg, 20 mg, Oral, DAILY, Andi Winters M.D., 20 mg at 06/29/19 0513  •  potassium chloride SA (Kdur) tablet 10 mEq, 10 mEq, Oral, DAILY, Andi Winters M.D., 10 mEq at 06/29/19 0514

## 2019-06-29 NOTE — OR SURGEON
Immediate Post OP Note    PreOp Diagnosis: Bilateral lesser hammertoes, right plantar fifth ulcer, left plantar second ulcer    PostOp Diagnosis: Same, left plantar third MTH contributing to ulcer, bilateral lesser hammertoes contributing to plantar pressure and ulceration    Procedure(s):  LENGTHENING, TENDON- GASTROUS SOLEUS RECESSION - Wound Class: Contaminated  METATARSAL HEAD RESECTION- RIGHT 5TH AND LEFT SECOND AND THIRD - Wound Class: Contaminated  TENOTOMY - Wound Class: Contaminated    Surgeon(s):  Yash Cooper M.D.    Anesthesiologist/Type of Anesthesia:  Anesthesiologist: Ren Howell M.D./General    Surgical Staff:  Circulator: Padmini Silva R.N.  Scrub Person: Joann GreenCarley    Specimens removed if any:  ID Type Source Tests Collected by Time Destination   1 : SECOND AND THIRD METATARSALS, LEFT Bone Toe AFB CULTURE, FUNGAL CULTURE, AEROBIC/ANAEROBIC CULTURE (SURGERY) Yash Cooper M.D. 6/28/2019  4:47 PM    2 : FIFTH METATARSAL, RIGHT Bone Toe AFB CULTURE, FUNGAL CULTURE, AEROBIC/ANAEROBIC CULTURE (SURGERY) Yash Cooper M.D. 6/28/2019  4:58 PM        Estimated Blood Loss: 25cc    Findings: Left plantar second and third MTH ulcer partial thickness, right plantar fifth MTH full thickness ulcer with necrotic head    Complications: None    Plan: WBAT BLE in cam boot, boot or prafo at all times      6/28/2019 5:16 PM Yash Cooper M.D.

## 2019-06-29 NOTE — ANESTHESIA POSTPROCEDURE EVALUATION
Patient: David Figueroa    Procedure Summary     Date:  06/28/19 Room / Location:  [place] [id number] / SURGERY [facility]    Anesthesia Start:  1548 Anesthesia Stop:  1727    Procedures:       LENGTHENING, TENDON- GASTROUS SOLEUS RECESSION (Bilateral Foot)      METATARSAL HEAD RESECTION- RIGHT 5TH AND LEFT SECOND AND THIRD (Bilateral Foot)      TENOTOMY (Bilateral Toe) Diagnosis:  (DIABETIC FOOT ULCERS)    Surgeon:  Yash Cooper M.D. Responsible Provider:  Ren Howell M.D.    Anesthesia Type:  general ASA Status:  4          Final Anesthesia Type: general  Last vitals  BP   Blood Pressure : (!) 91/66, NIBP: 105/67    Temp   37.1 °C (98.8 °F)    Pulse   Pulse: 90, Heart Rate (Monitored): 70   Resp   20    SpO2   95 %      Anesthesia Post Evaluation    Patient location during evaluation: PACU  Patient participation: complete - patient participated  Level of consciousness: awake and alert    Airway patency: patent  Anesthetic complications: no  Cardiovascular status: hemodynamically stable  Respiratory status: acceptable  Hydration status: stable    PONV: none           Nurse Pain Score: 0 (NPRS)

## 2019-06-29 NOTE — PROGRESS NOTES
Received pt report from day RN Judith, patient still in PACU.    Patient arrived from PACU at 1945.    Pt awake, alert, oriented X 4.  Pt resting in bed.  Bed in low locked position, call bell at the bedside, tray table & personal belongings within reach. Non-skid footwear intact. White board updated to reflect plan of care for current shift. Pt door tags reviewed. Bed Alarm ON.    Assessed patient’s Neuro Status, Comfort Level, Post Op Vitals, BL Feet CSM Intact.  .    Vitals WNL See Post Op Vitals.    Tele NSR.    Landon Melchor

## 2019-06-29 NOTE — CONSULTS
Cardiology Consult Note    Date note created:    6/29/2019          Patient ID:   Name:             David Figueroa   YOB: 1940  Age:                 79 y.o.  male   MRN:               [id number]               Referring Physician: Dr. Zamora                                                  Reason for Consult:      New diagnosis of cardiomyopathy    History of Present Illness:    79-year-old male with known coronary artery disease who presented to the hospital with lower extremity edema.  Patient reports being in his usual state of health until 3 weeks ago when he started having bilateral lower externally edema that steadily worsened.  He denies having any dyspnea, orthopnea or PND.  Of note, on presentation he had reported dyspnea to the admitting team.  He has not been checking his weight and denies any abdominal distention.  He denies any chest discomfort.    Patient is being currently treated for bilateral diabetic foot ulcers with underlying osteomyelitis.  Yesterday, he underwent foot surgery with Dr. Cooper.    He has known coronary artery disease.  In 2015 patient underwent an abnormal cardiac PET following which he underwent a coronary angiogram which showed diffuse multivessel disease and he was thought to not be a candidate for surgical intervention or PCI.  Aggressive medical management was recommended.  Patient is seen by Dr. Stanley in our cardiology clinic.    He also has a history of carotid endarterectomy.  He has poorly controlled diabetes.  Previously documented history of noncompliance.  Paroxysmal atrial fibrillation as well.  He is not on any anticoagulation as an outpatient for unclear reasons.  He denies any prior history of GI/ bleeding  Based on chart review, patient has a long-standing history of lower extremity edema and has also had chronic kidney disease.    Review of Systems:      A full review of systems was completed and all pertinent positives and negatives are  included in the HPI above. All others reviewed and negative.     Past Medical History:   Past Medical History:   Diagnosis Date   • Asthma    • Breath shortness     O2  3 l/m at night   • Cold 1/2015   • Dental disorder     upper lower   • Diabetes     oral meds   • Emphysema     O2 3 liters prn   • Hiatus hernia syndrome    • High cholesterol    • Hyperlipidemia    • Hypertension    • Other emphysema (HCC)    • Paroxysmal atrial fibrillation (HCC)    • Pneumonia 2013   • Snoring    • Stroke (HCC) 1999   • Weakness     since pneumonia   Coronary artery disease as above    Past Surgical History:  Past Surgical History:   Procedure Laterality Date   • TENDON LENGHTENING Bilateral 6/28/2019    Procedure: LENGTHENING, TENDON- GASTROUS SOLEUS RECESSION;  Surgeon: Yash Cooper M.D.;  Location: SURGERY [facility];  Service: Orthopedics   • METATARSAL HEAD RESECTION Bilateral 6/28/2019    Procedure: METATARSAL HEAD RESECTION- RIGHT 5TH AND LEFT SECOND AND THIRD;  Surgeon: Yash Cooper M.D.;  Location: SURGERY [facility];  Service: Orthopedics   • TENOTOMY Bilateral 6/28/2019    Procedure: TENOTOMY;  Surgeon: Yash Cooper M.D.;  Location: SURGERY [facility];  Service: Orthopedics   • RECOVERY  3/4/2015    Performed by Cath-Recovery Surgery at SURGERY SAME DAY [facility]   • CAROTID ENDARTERECTOMY  9/17/2014    Performed by Willy Solares M.D. at SURGERY [facility]   • ZZZ CARDIAC CATH      inoperable disease.       Family History:  History reviewed. No pertinent family history.  No premature coronary artery disease.    Social History:  Social History     Social History   • Marital status:      Spouse name: N/A   • Number of children: N/A   • Years of education: N/A     Occupational History   • Not on file.     Social History Main Topics   • Smoking status: Former Smoker     Packs/day: 2.00     Years: 55.00     Types: Cigarettes     Quit date: 7/1/2013   • Smokeless tobacco: Never  Used   • Alcohol use No   • Drug use: No   • Sexual activity: Not on file     Other Topics Concern   • Not on file     Social History Narrative   • No narrative on file       Hospital Medications:    Current Facility-Administered Medications:   •  insulin regular (HUMULIN R) injection 2-9 Units, 2-9 Units, Subcutaneous, TID AC **AND** Accu-Chek ACHS, , , Q AC AND BEDTIME(S) **AND** NOTIFY MD and PharmD, , , Once **AND** glucose 4 g chewable tablet 16 g, 16 g, Oral, Q15 MIN PRN **AND** DEXTROSE 10% BOLUS 250 mL, 250 mL, Intravenous, Q15 MIN PRN, Elly Zamora M.D.  •  cefepime (MAXIPIME) 2 g in  mL IVPB, 2 g, Intravenous, Q12HRS, RINA Petty.P.R.N., Stopped at 06/29/19 0548  •  Respiratory Care per Protocol, , Nebulization, Continuous RT, Jose Monterroso A.P.R.N.  •  linezolid (ZYVOX) tablet 600 mg, 600 mg, Oral, Q12HRS, Jose Monterroso A.P.R.N., 600 mg at 06/29/19 0514  •  senna-docusate (PERICOLACE or SENOKOT S) 8.6-50 MG per tablet 2 Tab, 2 Tab, Oral, BID, 2 Tab at 06/27/19 1738 **AND** polyethylene glycol/lytes (MIRALAX) PACKET 1 Packet, 1 Packet, Oral, QDAY PRN **AND** magnesium hydroxide (MILK OF MAGNESIA) suspension 30 mL, 30 mL, Oral, QDAY PRN **AND** bisacodyl (DULCOLAX) suppository 10 mg, 10 mg, Rectal, QDAY PRN, Andi Winters M.D.  •  heparin injection 5,000 Units, 5,000 Units, Subcutaneous, Q8HRS, Andi Winters M.D., 5,000 Units at 06/29/19 1221  •  acetaminophen (TYLENOL) tablet 650 mg, 650 mg, Oral, Q6HRS PRN, Andi Winters M.D.  •  albuterol (PROVENTIL) 2.5mg/0.5ml nebulizer solution 2.5 mg, 2.5 mg, Nebulization, Q4HRS PRN, Andi Winters M.D.  •  aspirin (ASA) chewable tab 81 mg, 81 mg, Oral, DAILY, Andi Winters M.D., 81 mg at 06/29/19 0514  •  atorvastatin (LIPITOR) tablet 20 mg, 20 mg, Oral, DAILY, Andi Winters M.D., 20 mg at 06/29/19 0514  •  carvedilol (COREG) tablet 3.125 mg, 3.125 mg, Oral, BID WITH MEALS, Andi Winters M.D.,  Stopped at 06/29/19 0730  •  famotidine (PEPCID) tablet 20 mg, 20 mg, Oral, DAILY, Andi Winters M.D., 20 mg at 06/29/19 0513  •  potassium chloride SA (Kdur) tablet 10 mEq, 10 mEq, Oral, DAILY, Andi Winters M.D., 10 mEq at 06/29/19 0514    Medication Allergy:  Allergies   Allergen Reactions   • Other Misc Rash     Cat Hair       Physical Exam:  Vitals/ General Appearance:   Weight/BMI: Body mass index is 32.89 kg/m².  /70   Pulse 88   Temp 36.8 °C (98.2 °F) (Temporal)   Resp 18   Ht 1.829 m (6')   Wt 110 kg (242 lb 8.1 oz)   SpO2 94%   Vitals:    06/29/19 0640 06/29/19 0718 06/29/19 0900 06/29/19 1223   BP: (!) 91/66 100/66 100/69 126/70   Pulse: 90 92  88   Resp: 20 18  18   Temp: 37.1 °C (98.8 °F) 36.7 °C (98.1 °F)  36.8 °C (98.2 °F)   TempSrc: Temporal Temporal  Temporal   SpO2: 95% 96%  94%   Weight:       Height:         Constitutional:   Well developed, Well nourished, No acute distress   HEENT:  Normocephalic, Atraumatic. Throat is supple.   Eyes: Conjunctiva normal  Neck:  Normal range of motion, no JVD.   Cardiovascular:  Normal rate, irregular rhythm, No murmurs, No rubs, No gallops. Extremities with intact distal pulses, no cyanosis.  He has 2-3+ pitting edema bilaterally.  His bilateral feet are wrapped and in boots.  His toes appear severely swollen.  Lungs:  Normal breath sounds, breath sounds clear to auscultation bilaterally,  no crackles, no wheezing.   Abdomen:  Soft, No tenderness, no distension  Skin: No rash  Neurologic: Alert & oriented x 3   Psychiatric: Affect normal     MDM (Data Review):     Records reviewed and summarized in current documentation    Lab Data Review:  Recent Labs      06/27/19   1447  06/28/19   0553  06/29/19   0355   WBC  6.1  5.5  7.0   RBC  3.70*  3.71*  3.51*   HEMOGLOBIN  11.2*  11.0*  10.6*   HEMATOCRIT  37.3*  36.6*  34.8*   MCV  100.8*  98.7*  99.1*   MCH  30.3  29.6  30.2   MCHC  30.0*  30.1*  30.5*   RDW  52.3*  52.1*  51.9*    PLATELETCT  211  216  204   MPV  10.2  10.3  10.4     Recent Labs      06/27/19   1447  06/28/19   0553  06/29/19   0355   SODIUM  141  143  144   POTASSIUM  4.6  4.3  4.5   CHLORIDE  102  105  105   CO2  29 27 27   GLUCOSE  110*  144*  198*   BUN  27*  30*  32*   CREATININE  1.46*  1.48*  1.49*   CALCIUM  9.2  8.9  8.9     Recent Labs      06/27/19   0303  06/28/19   0553  06/29/19   0355   TROPONINI  0.12*   --    --    BNPBTYPENAT  811*  1345*  994*       Labs reviewed as noted above.  Mild renal insufficiency with a creatinine of 1.4.  Mild anemia, stable.  BNP elevation.  Mild troponin elevation.    Imaging/Procedures Review:      EKG performed today at 2155 hrs. was personally reviewed and per my interpretation shows atrial fibrillation, ventricular rate in the 80s beats per minute.  Low voltage QRS in the limb leads.  Nonspecific ST-T wave changes.    Echocardiogram performed yesterday was personally reviewed and per my interpretation shows moderately reduced LV systolic function with an ejection fraction of 35%.  In some views LV function appears closer to normal.    MDM (Assessment and Plan):     Active Hospital Problems    Diagnosis   • Acute systolic CHF (congestive heart failure) ([facility]) [I50.21]     Priority: High   • Diabetic foot infection ([facility]) [E11.628, L08.9]     Priority: High   • DNR (do not resuscitate) [Z66]   • Cardiomyopathy ([facility]) [I42.9]   • Elevated troponin [R74.8]   • Macrocytosis [D75.89]   • Venous stasis dermatitis of both lower extremities [I87.2]   • CKD (chronic kidney disease), stage III ([facility]) [N18.3]   • CAD (coronary artery disease) [I25.10]   • Edema [R60.9]   • COPD (chronic obstructive pulmonary disease) ([facility]) [J44.9]   • Mixed hyperlipidemia [E78.2]   • Essential hypertension, benign [I10]   • Type 2 diabetes mellitus with kidney complication, without long-term current use of insulin ([facility]) [E11.29]     Coronary artery disease:  New diagnosis of cardiomyopathy:  Diabetic  foot ulcers, osteomyelitis:    Patient continues to have significant lower extremity edema.  He denies any other symptoms to suggest heart failure.  Patient is fluid overloaded.  Will increase Lasix to 40 mg IV twice daily.  I suspect he likely needs a higher dose of Lasix from 1 maybe 2 days.  Continue aspirin, statin and carvedilol at current dose.  Titrate carvedilol as needed.  Since patient does not have adequate blood pressure room and asked renal insufficiency, will hold off on ACE inhibitors.    In terms of etiology for his cardiomyopathy, he has known coronary artery disease that cannot be intervened upon.  This is likely ischemic cardiomyopathy.  I do not see any clear benefit with undergoing a repeat coronary angiography given his 2015 results.  However this can be reevaluated based on his overall clinical progress with his symptoms and his osteomyelitis treatment.    Will follow.     Thank you for allowing me to participate in the care of this patient. Please do not hesitate to contact me with any questions.    Kait Olivier MD  Cardiologist  [facility]

## 2019-06-29 NOTE — ANESTHESIA POSTPROCEDURE EVALUATION
Patient: David Figueroa    Procedure Summary     Date:  06/28/19 Room / Location:  [place] [address] / SURGERY [facility]    Anesthesia Start:  1548 Anesthesia Stop:  1727    Procedures:       LENGTHENING, TENDON- GASTROUS SOLEUS RECESSION (Bilateral Foot)      METATARSAL HEAD RESECTION- RIGHT 5TH AND LEFT SECOND AND THIRD (Bilateral Foot)      TENOTOMY (Bilateral Toe) Diagnosis:  (DIABETIC FOOT ULCERS)    Surgeon:  Yash Cooper M.D. Responsible Provider:  Ren Howell M.D.    Anesthesia Type:  general ASA Status:  4          Final Anesthesia Type: general  Last vitals  BP   Blood Pressure : (!) 91/66, NIBP: 105/67    Temp   37.1 °C (98.8 °F)    Pulse   Pulse: 90, Heart Rate (Monitored): 70   Resp   20    SpO2   95 %      Anesthesia Post Evaluation     Nurse Pain Score: 0 (NPRS)

## 2019-06-29 NOTE — OP REPORT
DATE OF SERVICE:  06/28/2019    PREOPERATIVE DIAGNOSES:  1.  Bilateral lesser hammertoes.  2.  Right plantar fifth metatarsal head ulceration.  3.  Left plantar second metatarsal head ulceration.    POSTOPERATIVE DIAGNOSES:  1.  Bilateral lesser hammertoes.  2.  Right plantar fifth metatarsal head ulceration.  3.  Left plantar second metatarsal head ulceration.  4.  Plantar left third metatarsal head contributing to ulcer.  5.  Bilaterally hammering of all lesser toes contributing to forefoot pressure   and ulceration.    PROCEDURES:  1.  Bilateral tendo-Achilles lengthening.  2.  Bilateral second, third, fourth and fifth percutaneous flexor tenotomies.  3.  Right fifth metatarsal head excision.  4.  Left second metatarsal head excision.  5.  Left third metatarsal head excision.  6.  Irrigation and debridement of skin, subcutaneous tissue to fascia, left   lower extremity.  7.  Irrigation and debridement of skin, subcutaneous tissue to bone, right   lower extremity.    SURGEON:  Yash Cooper MD    ANESTHESIA:  General.    ESTIMATED BLOOD LOSS:  25 mL.    TOURNIQUET TIME:  None.    FINDINGS:  1.  Plantar ulceration, left under the second and third metatarsal head,   partial thickness.  2.  Right plantar fifth metatarsal head ulceration, full thickness with   necrosis of the head.  3.  Hammering of each of his lesser toes contributing to forefoot pressure.    SPECIMENS:  Fifth metatarsal head for culture, right; left second and third   metatarsal heads for culture.    COMPLICATIONS:  None.    OUTCOME:  PACU in stable condition.    HISTORY OF PRESENT ILLNESS:  A 79-year-old diabetic gentleman admitted with   nonhealing wounds on his bilateral feet as previously described.  He is   indicated for the above stated procedure.  He was greeted in the preoperative   holding area and identified by name and medical record number.  Bilateral   lower extremities were marked.  Risks of the procedure including bleeding,    infection, pain, continuation of symptoms, need for more surgeries and   neurovascular damage were discussed and he provided written consent.    DESCRIPTION OF PROCEDURE:  He was taken to the operating room and placed on   the table in supine position.  Preoperative antibiotics were not administered   given the broad-spectrum antibiotics on the floor.  General anesthesia was   induced.  Nonsterile tourniquets were placed on his bilateral thighs and his   bilateral lower extremities were prepped and draped in the usual sterile   fashion using Betadine prep and paint.  An operative pause was taken where all   present were in agreement with patient identification, laterality and   procedure to be performed.    Left tendo-Achilles lengthening:  We performed 3 half cuts in the tendon, 2   medial, 1 lateral, approximately 1.5 cm apart.  This gained us 20 additional   degrees of dorsiflexion.  Each of these was closed with a simple 3-0 nylon.    Left second, third and fourth percutaneous tenotomies:  We evaluated the   hammering of his toes and felt that the tenotomies of each would significantly   assist in relieving pressure on his forefoot.  We inserted the 15 blade on   the plantar proximal crease and transected the long flexor tendon.  This was   done on the second, third and fourth toe in an identical fashion.  We had a   nice release of hammering from each.  Each of these was closed with a 3-0   nylon in simple fashion.    Left second and third metatarsal head excision and medial utility incision in   the second webspace:  Blunt dissection carried down to the second metatarsal   head.  It was removed using an oscillating saw.  It was then passed for   culture.  In palpation of the plantar ulcer, this did not completely alleviate   the pressure in the plantar aspect.  The third metatarsal head was also quite   prominent within the partial-thickness wound.  We performed a third   metatarsal head excision using the  oscillating saw in the same fashion.    Irrigation and debridement of skin, subcutaneous tissue to fascia in multiple   compartments of the right foot:  The dorsal wound was irrigated.  We   additionally used forceps and scalpel to excise nonviable tissue on the   partial-thickness plantar ulceration.  Thick surrounding callus was also   removed.  We again copiously irrigated the dorsal wound and the incision was   closed with 3-0 nylon in horizontal mattress fashion.  Adaptic gauze, Sof-Rol   and an Ace were placed.    Right tendo-Achilles lengthening:  I performed a tendo-Achilles lengthening   and percutaneous tenotomies on the right exactly as described on the left.    These were closed in the same way.    Fifth metatarsal head excision:  A utility incision was made in the fourth   webspace.  Blunt dissection carried down to the fifth metatarsophalangeal   joint, which was sharply opened.  The metatarsal head was removed with a   plantar and lateral bevel using an oscillating saw.  The head was somewhat   difficult to remove as it had been significantly disintegrated, as had the   proximal aspect of the fifth proximal phalanx.  This was removed and it was   sent for culture.  The ulcer on the plantar aspect was found to be full   thickness.  There were nonviable tendons that were sharply excised and   removed.    Irrigation and debridement of multiple compartments to bone, right foot:  We   copiously irrigated the plantar ulceration.  We used forceps and scalpel to   remove fibrinous and nonviable tissue.  The irrigation was performed through   the dorsal incision as well.  It was then closed with 3-0 nylon in horizontal   mattress fashion.  Adaptic gauze, Sof-Rol and an Ace wrap were placed as with   the right.  We did not encounter any significant bleeding in any of the   incisions even though we did not use a tourniquet.  The right foot was found   to be significantly edematous and we did encounter this  within the dorsal   incision.  The patient was awakened and extubated in stable condition.    POSTOPERATIVE COURSE:  He will remain under the care of the hospitalist   service with orthopedics, infectious disease and limb preservation following.    Weightbearing as tolerated in Cam walker boots.  Otherwise, I would like his   peripheral boots on at all times to hold the dorsiflexion that we have gained.    We will continue to follow along.       ____________________________________     MD MONI VALDEZ / ADRIANA    DD:  06/28/2019 17:27:19  DT:  06/28/2019 19:08:07    D#:  [id number]  Job#:  [id number]

## 2019-06-29 NOTE — PROGRESS NOTES
Telemetry Summary:    Measurements: .18/.12/.38    Rhythm: NSR    Ectopy: PVC & PAC    Rate: 83-92    Landon Melchor

## 2019-06-29 NOTE — THERAPY
"  Speech Language Therapy Clinical Swallow Evaluation completed.  Functional Status: Pt seen on this date for a clinical swallow evaluation. Pt A&Ox3 with disorientation to year (stated it was 2020). Per RN, pt on diabetic (regular) diet until last night/this morning as RN stating pt coughing/choking with coffee and made NPO at 10:29 AM. Pt is edentulous and had teeth at bedside. Pt with intermittent weak, unproductive cough prior to PO. PO trials of NTL via cup sip, purees, soft solids, and thin liquids were presented to pt and increase in wet cough and wet voice following PO were noted which may be concerning for aspiration. Upon palpation, laryngeal elevation was weak and swallow trigger timely. Mastication slightly prolonged. Pt impulsive when self-feeding and required cues to reduce bite size and slow down feeding rate. At this time, would recommend that pt be placed NPO with a FEES evaluation to assess swallow function and rule out aspiration, however, pt does not agree with recommendations and stated that he would like to eat despite risk. Dr. Zamora present at bedside to discuss concerns for risk of aspiration and current code status and MD to change pt's diet to dysphagia II/NTL despite risk. SLP to follow.    Recommendations - Diet:  Recommend NPO with FEES evaluation to assess swallow function, however, pt choosing to eat despite risk. MD to begin pt on dysphagia II/NTL diet                          Strategies: to be determined                          Medication Administration: non-orally  Plan of Care: Will benefit from Speech Therapy 3 times per week  Post-Acute Therapy: Recommend inpatient transitional care services for continued speech therapy services.        See \"Rehab Therapy-Acute\" Patient Summary Report for complete documentation.   "

## 2019-06-29 NOTE — PROGRESS NOTES
Infectious Disease Progress Note    Author: Mikhail Murry M.D. Date & Time of service: 2019  9:15 AM    Chief Complaint:  Bilateral diabetic foot wounds    Interval History:  79-year-old male admitted on 2019 due to worsening bilateral foot wounds and worsening CHF.     -AF, WBC 5.5, no issue with antibiotics, denies pain, denies being short of breath.   Afebrile, white count 7000.  Patient tolerating antibiotics, no new issues.    Labs Reviewed, Medications Reviewed and Wound Reviewed.    Review of Systems:  Review of Systems   Constitutional: Negative for chills, fever and malaise/fatigue.   HENT: Negative for sore throat.    Eyes: Negative for blurred vision.   Respiratory: Negative for shortness of breath.    Cardiovascular: Positive for leg swelling. Negative for chest pain.   Gastrointestinal: Negative for abdominal pain, constipation, diarrhea, nausea and vomiting.   Genitourinary: Negative for dysuria.   Musculoskeletal: Negative for back pain, joint pain and myalgias.   Skin: Negative for rash.   Neurological: Positive for sensory change. Negative for headaches.   Psychiatric/Behavioral: The patient is not nervous/anxious.        Hemodynamics:  Temp (24hrs), Av.4 °C (97.6 °F), Min:36 °C (96.8 °F), Max:37.1 °C (98.8 °F)  Temperature: 36.7 °C (98.1 °F)  Pulse  Av  Min: 69  Max: 94Heart Rate (Monitored): 70  Blood Pressure : 100/66, NIBP: 105/67       Physical Exam:  Physical Exam   Constitutional: He is oriented to person, place, and time. He appears well-developed and well-nourished. No distress.   HENT:   Head: Normocephalic and atraumatic.   Mouth/Throat: Oropharynx is clear and moist. No oropharyngeal exudate.   Eyes: Pupils are equal, round, and reactive to light. Conjunctivae and EOM are normal. No scleral icterus.   Neck: Normal range of motion. Neck supple. No tracheal deviation present.   Cardiovascular: Normal rate, regular rhythm, normal heart sounds and intact distal  pulses.    No murmur heard.  Pulmonary/Chest: Effort normal and breath sounds normal. No respiratory distress. He has no wheezes. He has no rales.   On nasal cannula oxygen   Abdominal: Soft. Bowel sounds are normal. He exhibits no distension. There is no tenderness. There is no rebound and no guarding.   Musculoskeletal: He exhibits edema (Trace to +1 BLE) and deformity. He exhibits no tenderness.   Bilateral feet with surgical dressing in place.  Edema distal to dressing noted   Neurological: He is alert and oriented to person, place, and time. No cranial nerve deficit.   Skin: Skin is warm and dry. No rash noted. He is not diaphoretic. There is erythema.   Psychiatric: He has a normal mood and affect. Thought content normal.   Nursing note and vitals reviewed.      Meds:    Current Facility-Administered Medications:   •  cefepime  •  Respiratory Care per Protocol  •  linezolid  •  insulin regular **AND** Accu-Chek ACHS **AND** NOTIFY MD and PharmD **AND** glucose **AND** dextrose 10% bolus  •  senna-docusate **AND** polyethylene glycol/lytes **AND** magnesium hydroxide **AND** bisacodyl  •  heparin  •  acetaminophen  •  albuterol  •  aspirin  •  atorvastatin  •  carvedilol  •  famotidine  •  potassium chloride SA  •  furosemide    Labs:  Recent Labs      06/27/19   1447  06/28/19   0553  06/29/19   0355   WBC  6.1  5.5  7.0   RBC  3.70*  3.71*  3.51*   HEMOGLOBIN  11.2*  11.0*  10.6*   HEMATOCRIT  37.3*  36.6*  34.8*   MCV  100.8*  98.7*  99.1*   MCH  30.3  29.6  30.2   RDW  52.3*  52.1*  51.9*   PLATELETCT  211  216  204   MPV  10.2  10.3  10.4   NEUTSPOLYS  68.30  63.80  72.80*   LYMPHOCYTES  18.00*  20.30*  12.20*   MONOCYTES  9.30  11.60  12.20   EOSINOPHILS  3.60  3.40  2.20   BASOPHILS  0.50  0.50  0.30     Recent Labs      06/27/19   1447  06/28/19   0553  06/29/19   0355   SODIUM  141  143  144   POTASSIUM  4.6  4.3  4.5   CHLORIDE  102  105  105   CO2  29  27  27   GLUCOSE  110*  144*  198*   BUN  27*   "30*  32*     Recent Labs      06/27/19   1447  06/28/19   0553  06/29/19   0355   CREATININE  1.46*  1.48*  1.49*       Imaging:  No new imaging last 24 hours.      Micro:  Results     Procedure Component Value Units Date/Time    Fungal Culture [[id number]] Collected:  06/28/19 1658    Order Status:  Completed Specimen:  Other Updated:  06/28/19 1813    Anaerobic Culture [[id number]] Collected:  06/28/19 1658    Order Status:  Completed Specimen:  Other Updated:  06/28/19 1813    CULTURE TISSUE W/ GRM STAIN [[id number]] Collected:  06/28/19 1658    Order Status:  Completed Specimen:  Other Updated:  06/28/19 1813    AFB Culture [[id number]] Collected:  06/28/19 1658    Order Status:  Completed Specimen:  Other Updated:  06/28/19 1813    CULTURE TISSUE W/ GRM STAIN [[id number]] Collected:  06/28/19 1647    Order Status:  Completed Specimen:  Other Updated:  06/28/19 1811    AFB Culture [[id number]] Collected:  06/28/19 1647    Order Status:  Completed Specimen:  Other Updated:  06/28/19 1811    Anaerobic Culture [[id number]] Collected:  06/28/19 1647    Order Status:  Completed Specimen:  Other Updated:  06/28/19 1811    Fungal Culture [[id number]] Collected:  06/28/19 1647    Order Status:  Completed Specimen:  Other Updated:  06/28/19 1811    Blood Culture [[id number]] Collected:  06/25/19 1342    Order Status:  Completed Specimen:  Blood from Peripheral Updated:  06/26/19 0853     Significant Indicator NEG     Source BLD     Site PERIPHERAL     Culture Result No Growth  Note: Blood cultures are incubated for 5 days and  are monitored continuously.Positive blood cultures  are called to the RN and reported as soon as  they are identified.      Narrative:       Per Hospital Policy: Only change Specimen Src: to \"Line\" if  specified by physician order.  Right Hand    BLOOD CULTURE [[id number]] Collected:  06/25/19 1237    Order Status:  Completed Specimen:  Blood from Peripheral Updated:  06/26/19 0853     Significant Indicator " "NEG     Source BLD     Site PERIPHERAL     Culture Result No Growth  Note: Blood cultures are incubated for 5 days and  are monitored continuously.Positive blood cultures  are called to the RN and reported as soon as  they are identified.      Narrative:       Per Hospital Policy: Only change Specimen Src: to \"Line\" if  specified by physician order.  No site indicated    CULTURE WOUND W/ GRAM STAIN [[id number]] Collected:  06/25/19 0000    Order Status:  Canceled Specimen:  Other from Left Foot           Assessment:  Active Hospital Problems    Diagnosis       • Diabetic foot infection ([facility]) [E11.628, L08.9]   • Venous stasis dermatitis of both lower extremities [I87.2]   • CKD (chronic kidney disease), stage III ([facility]) [N18.3]    Bilateral feet osteomyelitis   • COPD (chronic obstructive pulmonary disease) ([facility]) [J44.9]   • Type 2 diabetes mellitus with kidney complication, without long-term current use of insulin ([facility]) [E11.29]       Plan:  Bilateral diabetic foot ulcers with underlying osteomyelitis  Afebrile  No leukocytosis  Blood cultures on 6/25 NGTD  On 6/28, performed bilateral GSR, right fifth MTH excision and left second MTH excision, I&D  Being followed by LPS  Continue p.o. Zyvox 600 mill grams every 12 hours and IV cefepime dosing to 2 g every 12 hours for now  Monitor white counts while on Zyvox  We will follow OR cultures for antibiotic choice  Anticipate 6 weeks of IV antibiotics    Uncontrolled type 2 diabetes  Hemoglobin A1c 10.3% on 6/26/2019  Will adversely affect wound healing and resolution of infection    CKD-III  CrCl~ 50-55  Renally adjust antibiotics as needed  Continue to monitor closely    Patient was discussed with Dr. Zamora    ID will follow.  Please call with questions.  "

## 2019-06-29 NOTE — OR NURSING
Pt's daughter, Sarbjit, called to recieve update.   Pt out of PACU with transport and ACLS RN, on monitor.

## 2019-06-29 NOTE — CARE PLAN
Problem: Communication  Goal: The ability to communicate needs accurately and effectively will improve  Outcome: PROGRESSING AS EXPECTED  Discussed POC with patient, patient agrees to participate in POC.  Patient encouraged to use call bell to ring for assistance.  Patient oriented to room, call bell, and bed controls.  Open line of communication established with the patient.        Problem: Safety  Goal: Will remain free from injury  Outcome: PROGRESSING AS EXPECTED  Instructed patient to use call bell and ring for assistance prior to ambulation.  Non-Skid foot ware in place, bed in low locked position, call bell and personal belongings within reach.        Problem: Infection  Goal: Will remain free from infection  Outcome: PROGRESSING AS EXPECTED  IV Cefapime and PO Zyvox     Problem: Venous Thromboembolism (VTW)/Deep Vein Thrombosis (DVT) Prevention:  Goal: Patient will participate in Venous Thrombosis (VTE)/Deep Vein Thrombosis (DVT)Prevention Measures  Outcome: PROGRESSING AS EXPECTED  SQ Heparin     Problem: Knowledge Deficit  Goal: Knowledge of disease process/condition, treatment plan, diagnostic tests, and medications will improve  Outcome: PROGRESSING AS EXPECTED  Discussed plan of care and medications with patient.  Patient verbalizes understandings of treatment regimen.        Problem: Respiratory:  Goal: Respiratory status will improve  Outcome: PROGRESSING AS EXPECTED  3L Oxygen     Problem: Mobility  Goal: Risk for activity intolerance will decrease  Outcome: PROGRESSING AS EXPECTED  BL Foot boots delivered at the patient's bedside.  Patient is Post-Op Day 1.  PT/OT ordered.  Patient turned and positioned Q2hrs.      Problem: Skin Integrity  Goal: Risk for impaired skin integrity will decrease  Outcome: PROGRESSING AS EXPECTED  See Skin Note

## 2019-06-30 LAB
ANION GAP SERPL CALC-SCNC: 5 MMOL/L (ref 0–11.9)
BACTERIA BLD CULT: NORMAL
BACTERIA BLD CULT: NORMAL
BASOPHILS # BLD AUTO: 0.2 % (ref 0–1.8)
BASOPHILS # BLD: 0.01 K/UL (ref 0–0.12)
BUN SERPL-MCNC: 29 MG/DL (ref 8–22)
CALCIUM SERPL-MCNC: 8.6 MG/DL (ref 8.5–10.5)
CHLORIDE SERPL-SCNC: 105 MMOL/L (ref 96–112)
CO2 SERPL-SCNC: 28 MMOL/L (ref 20–33)
COMMENT 1642: NORMAL
CREAT SERPL-MCNC: 1.45 MG/DL (ref 0.5–1.4)
EKG IMPRESSION: NORMAL
EOSINOPHIL # BLD AUTO: 0.07 K/UL (ref 0–0.51)
EOSINOPHIL NFR BLD: 1.3 % (ref 0–6.9)
ERYTHROCYTE [DISTWIDTH] IN BLOOD BY AUTOMATED COUNT: 52.5 FL (ref 35.9–50)
GLUCOSE BLD-MCNC: 203 MG/DL (ref 65–99)
GLUCOSE BLD-MCNC: 204 MG/DL (ref 65–99)
GLUCOSE BLD-MCNC: 206 MG/DL (ref 65–99)
GLUCOSE BLD-MCNC: 274 MG/DL (ref 65–99)
GLUCOSE SERPL-MCNC: 244 MG/DL (ref 65–99)
HCT VFR BLD AUTO: 32.3 % (ref 42–52)
HGB BLD-MCNC: 9.5 G/DL (ref 14–18)
IMM GRANULOCYTES # BLD AUTO: 0.02 K/UL (ref 0–0.11)
IMM GRANULOCYTES NFR BLD AUTO: 0.4 % (ref 0–0.9)
LYMPHOCYTES # BLD AUTO: 0.99 K/UL (ref 1–4.8)
LYMPHOCYTES NFR BLD: 19 % (ref 22–41)
MCH RBC QN AUTO: 29.1 PG (ref 27–33)
MCHC RBC AUTO-ENTMCNC: 29.4 G/DL (ref 33.7–35.3)
MCV RBC AUTO: 99.1 FL (ref 81.4–97.8)
MONOCYTES # BLD AUTO: 0.81 K/UL (ref 0–0.85)
MONOCYTES NFR BLD AUTO: 15.6 % (ref 0–13.4)
MORPHOLOGY BLD-IMP: NORMAL
NEUTROPHILS # BLD AUTO: 3.3 K/UL (ref 1.82–7.42)
NEUTROPHILS NFR BLD: 63.5 % (ref 44–72)
NRBC # BLD AUTO: 0 K/UL
NRBC BLD-RTO: 0 /100 WBC
PLATELET # BLD AUTO: 189 K/UL (ref 164–446)
PMV BLD AUTO: 10.8 FL (ref 9–12.9)
POTASSIUM SERPL-SCNC: 4.1 MMOL/L (ref 3.6–5.5)
RBC # BLD AUTO: 3.26 M/UL (ref 4.7–6.1)
RBC BLD AUTO: NORMAL
SIGNIFICANT IND 70042: NORMAL
SIGNIFICANT IND 70042: NORMAL
SITE SITE: NORMAL
SITE SITE: NORMAL
SODIUM SERPL-SCNC: 138 MMOL/L (ref 135–145)
SOURCE SOURCE: NORMAL
SOURCE SOURCE: NORMAL
WBC # BLD AUTO: 5.2 K/UL (ref 4.8–10.8)

## 2019-06-30 PROCEDURE — 99232 SBSQ HOSP IP/OBS MODERATE 35: CPT | Performed by: INTERNAL MEDICINE

## 2019-06-30 PROCEDURE — A9270 NON-COVERED ITEM OR SERVICE: HCPCS | Performed by: NURSE PRACTITIONER

## 2019-06-30 PROCEDURE — 700102 HCHG RX REV CODE 250 W/ 637 OVERRIDE(OP): Performed by: INTERNAL MEDICINE

## 2019-06-30 PROCEDURE — 700111 HCHG RX REV CODE 636 W/ 250 OVERRIDE (IP): Performed by: INTERNAL MEDICINE

## 2019-06-30 PROCEDURE — 700111 HCHG RX REV CODE 636 W/ 250 OVERRIDE (IP): Performed by: NURSE PRACTITIONER

## 2019-06-30 PROCEDURE — 307059 PAD,EAR PROTECTOR: Performed by: INTERNAL MEDICINE

## 2019-06-30 PROCEDURE — 80048 BASIC METABOLIC PNL TOTAL CA: CPT

## 2019-06-30 PROCEDURE — 700105 HCHG RX REV CODE 258: Performed by: NURSE PRACTITIONER

## 2019-06-30 PROCEDURE — 700102 HCHG RX REV CODE 250 W/ 637 OVERRIDE(OP): Performed by: NURSE PRACTITIONER

## 2019-06-30 PROCEDURE — A6213 FOAM DRG >16<=48 SQ IN W/BDR: HCPCS | Performed by: INTERNAL MEDICINE

## 2019-06-30 PROCEDURE — A9270 NON-COVERED ITEM OR SERVICE: HCPCS | Performed by: INTERNAL MEDICINE

## 2019-06-30 PROCEDURE — 82962 GLUCOSE BLOOD TEST: CPT | Mod: 91

## 2019-06-30 PROCEDURE — 36415 COLL VENOUS BLD VENIPUNCTURE: CPT

## 2019-06-30 PROCEDURE — 85025 COMPLETE CBC W/AUTO DIFF WBC: CPT

## 2019-06-30 PROCEDURE — 99233 SBSQ HOSP IP/OBS HIGH 50: CPT | Performed by: INTERNAL MEDICINE

## 2019-06-30 PROCEDURE — 770020 HCHG ROOM/CARE - TELE (206)

## 2019-06-30 RX ORDER — TORSEMIDE 100 MG/1
100 TABLET ORAL
Status: DISCONTINUED | OUTPATIENT
Start: 2019-07-01 | End: 2019-07-06

## 2019-06-30 RX ORDER — CARVEDILOL 6.25 MG/1
6.25 TABLET ORAL 2 TIMES DAILY WITH MEALS
Status: DISCONTINUED | OUTPATIENT
Start: 2019-07-01 | End: 2019-07-09 | Stop reason: HOSPADM

## 2019-06-30 RX ORDER — FUROSEMIDE 10 MG/ML
40 INJECTION INTRAMUSCULAR; INTRAVENOUS
Status: DISCONTINUED | OUTPATIENT
Start: 2019-06-30 | End: 2019-06-30

## 2019-06-30 RX ORDER — INSULIN GLARGINE 100 [IU]/ML
10 INJECTION, SOLUTION SUBCUTANEOUS EVERY EVENING
Status: DISCONTINUED | OUTPATIENT
Start: 2019-06-30 | End: 2019-07-09 | Stop reason: HOSPADM

## 2019-06-30 RX ORDER — LOSARTAN POTASSIUM 25 MG/1
25 TABLET ORAL
Status: DISCONTINUED | OUTPATIENT
Start: 2019-07-01 | End: 2019-07-09 | Stop reason: HOSPADM

## 2019-06-30 RX ADMIN — INSULIN HUMAN 5 UNITS: 100 INJECTION, SOLUTION PARENTERAL at 17:48

## 2019-06-30 RX ADMIN — FUROSEMIDE 40 MG: 10 INJECTION, SOLUTION INTRAVENOUS at 05:19

## 2019-06-30 RX ADMIN — INSULIN HUMAN 3 UNITS: 100 INJECTION, SOLUTION PARENTERAL at 05:14

## 2019-06-30 RX ADMIN — ASPIRIN 81 MG 81 MG: 81 TABLET ORAL at 05:19

## 2019-06-30 RX ADMIN — CEFEPIME 2 G: 2 INJECTION, POWDER, FOR SOLUTION INTRAVENOUS at 17:41

## 2019-06-30 RX ADMIN — FAMOTIDINE 20 MG: 20 TABLET ORAL at 05:19

## 2019-06-30 RX ADMIN — LINEZOLID 600 MG: 600 TABLET, FILM COATED ORAL at 05:19

## 2019-06-30 RX ADMIN — LINEZOLID 600 MG: 600 TABLET, FILM COATED ORAL at 17:41

## 2019-06-30 RX ADMIN — INSULIN HUMAN 3 UNITS: 100 INJECTION, SOLUTION PARENTERAL at 12:32

## 2019-06-30 RX ADMIN — HEPARIN SODIUM 5000 UNITS: 5000 INJECTION, SOLUTION INTRAVENOUS; SUBCUTANEOUS at 21:32

## 2019-06-30 RX ADMIN — HEPARIN SODIUM 5000 UNITS: 5000 INJECTION, SOLUTION INTRAVENOUS; SUBCUTANEOUS at 05:19

## 2019-06-30 RX ADMIN — HEPARIN SODIUM 5000 UNITS: 5000 INJECTION, SOLUTION INTRAVENOUS; SUBCUTANEOUS at 14:52

## 2019-06-30 RX ADMIN — CEFEPIME 2 G: 2 INJECTION, POWDER, FOR SOLUTION INTRAVENOUS at 05:19

## 2019-06-30 RX ADMIN — SENNOSIDES, DOCUSATE SODIUM 2 TABLET: 50; 8.6 TABLET, FILM COATED ORAL at 17:40

## 2019-06-30 RX ADMIN — CARVEDILOL 3.12 MG: 3.12 TABLET, FILM COATED ORAL at 08:52

## 2019-06-30 RX ADMIN — FUROSEMIDE 40 MG: 10 INJECTION, SOLUTION INTRAVENOUS at 14:52

## 2019-06-30 RX ADMIN — ATORVASTATIN CALCIUM 20 MG: 20 TABLET, FILM COATED ORAL at 05:19

## 2019-06-30 RX ADMIN — INSULIN GLARGINE 10 UNITS: 100 INJECTION, SOLUTION SUBCUTANEOUS at 17:48

## 2019-06-30 ASSESSMENT — ENCOUNTER SYMPTOMS
BLURRED VISION: 0
FALLS: 0
PALPITATIONS: 0
NECK PAIN: 0
DIZZINESS: 0
HEARTBURN: 0
STRIDOR: 0
CHILLS: 0
APNEA: 0
FEVER: 0
MYALGIAS: 0
ABDOMINAL PAIN: 0
DEPRESSION: 0
FATIGUE: 1
CONSTIPATION: 0
SHORTNESS OF BREATH: 0
WHEEZING: 1
CHEST TIGHTNESS: 0
PND: 0
COUGH: 1
CHOKING: 0
WEAKNESS: 0
EYE PAIN: 0
TINGLING: 0

## 2019-06-30 ASSESSMENT — LIFESTYLE VARIABLES: SUBSTANCE_ABUSE: 0

## 2019-06-30 NOTE — PROGRESS NOTES
Kitchen notified to deliver early tray to patient ASAP as pt now has dysphagia diet ordered. Pt's son's girlfriend present in room and aware we are waiting for his meal.  Pt provided with applesauce, thickened water and thickened milk to snack on while waiting.

## 2019-06-30 NOTE — PROGRESS NOTES
[facility] Medicine Daily Progress Note    Date of Service  6/30/2019    Chief Complaint  79 y.o. male admitted 6/25/2019 with BLE swelling and diabetic foot infection.    Hospital Course    H/O COPD, CAD, HTN, DM, HLD.  Patient follows with outpatient wound care for chronic, nonhealing bilateral diabetic foot wounds.  Presented to ED after developing worsening erythema and swelling of BLE as well as purulent drainage from foot wounds.  Started on antibiotics.  ID and LPS consulted.  Also found to have mildly elevated troponin with stable EKG.  Suspect volume overload with elevated BNP and edema noted on chest x-ray.  Continue diuresis.  Echo revealing new CHF with EF 35%.  Cardiology consulted.  Pending surgical I&D.  Will need cardiac clearance prior to surgery.          Interval Problem Update  6/26:  Bilateral foot wounds noted with purulent drainage.  Dressing intact.  Denies pain.  Improvement of BLE edema with diuresis, although noted to have stasis dermatitis.  Wheezing noted on ausculation.  Denies shortness of breath.  Pro curt negative.  Bilateral opacities noted on CXR.  Echo pending.   6/27:  Echo reveals EF 35%.  New HF diagnosis.  Cardiology consulted.  Edema overall improved with diuresis.  Troponin trending down.  No chest pain.  Pending surgical I&D.  Will need cardiac clearance prior.  Hgb A1c 10.3.  Started on SSI.     6/28.  Patient has been n.p.o. to prepare for surgery today.  Patient agreed with surgery.  Patient's breathing condition improved after diuretics.  Patient complains of local pain. Patient's pain is local, 4-6/10, intermittent and does not radiate to other location, sharp and with some tingling. Can be controlled by pain meds. Dressing in place.  6/29.  Patient tolerated procedure very well.  Patient was noticed to have been choking on food in the morning.  Speech evaluation recommended.  Recommend cautiously with oral intake until speech evaluation.  Recommend current status as  n.p.o. and sips with medication.  Patient's lower extremity pain still present. Patient's pain is local, 6-8/10, intermittent and does not radiate to other location, sharp and with some tingling. Can be controlled by pain meds. Dressing in place.  6/30. Patient is pleasant and able to tolerate p.o. currently without significant shortness of breath.  Risk of benefit for using fees test or eventually needs tube feeding given to the patient and patient declined fees tube again test again.  Patient is alert and oriented and does have capacity to make medical decision.  Explained patient to the patient's family at bedside. Patient otherwise denies fever, chills, nausea, vomiting, adb pain, SOB, CP, headache, constipation, diarrhea, cough, or sputum.      Consultants/Specialty  ID  LPS  Cardiology    Code Status  DNR    Disposition  SNF    Review of Systems  Review of Systems   Constitutional: Negative for chills and fever.   HENT: Negative for congestion, ear pain, hearing loss and nosebleeds.    Eyes: Negative for blurred vision and pain.   Respiratory: Positive for cough and wheezing. Negative for shortness of breath.    Cardiovascular: Positive for leg swelling. Negative for chest pain, palpitations and PND.   Gastrointestinal: Negative for abdominal pain, constipation and heartburn.   Genitourinary: Negative for dysuria, frequency and hematuria.   Musculoskeletal: Negative for falls, myalgias and neck pain.   Skin: Negative for itching and rash.        BLE swelling and erythema.  Bilateral foot wounds.    Neurological: Negative for dizziness, tingling and weakness.   Psychiatric/Behavioral: Negative for depression and substance abuse.        Physical Exam  Temp:  [36.5 °C (97.7 °F)-36.7 °C (98.1 °F)] 36.6 °C (97.9 °F)  Pulse:  [] 73  Resp:  [16-20] 20  BP: (105-125)/(64-78) 118/68  SpO2:  [93 %-97 %] 95 %    Physical Exam   Constitutional: He is oriented to person, place, and time. He appears well-nourished. No  distress.   Obese male.    HENT:   Head: Normocephalic and atraumatic.   Eyes: Pupils are equal, round, and reactive to light. Conjunctivae and EOM are normal. Right eye exhibits no discharge. Left eye exhibits no discharge.   Neck: Normal range of motion. Neck supple. No thyromegaly present.   Cardiovascular: Normal rate, regular rhythm and normal heart sounds.  Exam reveals no friction rub.    No murmur heard.  BLE edema   Pulmonary/Chest: Effort normal. No stridor. No respiratory distress. He has decreased breath sounds in the right lower field and the left lower field. He has wheezes in the right lower field, the left upper field, the left middle field and the left lower field. He exhibits no tenderness.   Patient's cough is weak and patient showing signs of aspiration   Abdominal: Soft. Bowel sounds are normal. There is no tenderness. There is no rebound and no guarding.   Musculoskeletal: Normal range of motion. He exhibits edema. He exhibits no tenderness or deformity.   Lymphadenopathy:     He has no cervical adenopathy.   Neurological: He is alert and oriented to person, place, and time. He displays normal reflexes.   Skin: Skin is warm and dry. No rash noted. He is not diaphoretic. There is erythema.   Stasis dermatitis noted to BLE   Bilateral diabetic foot wounds with purulent drainage.  Dressing intact.    Psychiatric: He has a normal mood and affect.   Nursing note and vitals reviewed.      Fluids    Intake/Output Summary (Last 24 hours) at 06/30/19 1550  Last data filed at 06/30/19 1000   Gross per 24 hour   Intake              780 ml   Output              980 ml   Net             -200 ml       Laboratory  Recent Labs      06/28/19   0553  06/29/19   0355  06/30/19   0031   WBC  5.5  7.0  5.2   RBC  3.71*  3.51*  3.26*   HEMOGLOBIN  11.0*  10.6*  9.5*   HEMATOCRIT  36.6*  34.8*  32.3*   MCV  98.7*  99.1*  99.1*   MCH  29.6  30.2  29.1   MCHC  30.1*  30.5*  29.4*   RDW  52.1*  51.9*  52.5*   PLATELETCT   216  204  189   MPV  10.3  10.4  10.8     Recent Labs      06/28/19   0553  06/29/19   0355  06/30/19   0031   SODIUM  143  144  138   POTASSIUM  4.3  4.5  4.1   CHLORIDE  105  105  105   CO2  27  27  28   GLUCOSE  144*  198*  244*   BUN  30*  32*  29*   CREATININE  1.48*  1.49*  1.45*   CALCIUM  8.9  8.9  8.6         Recent Labs      06/28/19   0553  06/29/19   0355   BNPBTYPENAT  1345*  994*           Imaging  US-RACHEL SINGLE LEVEL BILAT   Final Result      EC-ECHOCARDIOGRAM COMPLETE W/ CONT   Final Result      US-EXTREMITY VENOUS LOWER BILAT   Final Result      DX-SHOULDER 2+ RIGHT   Final Result      1.  No acute findings.      2.  Severe degenerative change in the right acromioclavicular joint.      DX-FOOT-COMPLETE 3+ RIGHT   Final Result      1.  Lucency in the right fifth toe, possibly representing osteomyelitis. If clinically indicated, MRI could be performed for confirmation.      2.  Soft tissue edema within the forefoot.      DX-FOOT-COMPLETE 3+ LEFT   Final Result      1.  No definitive evidence for osteomyelitis.      2.  Forefoot deformities as described.      DX-CHEST-PORTABLE (1 VIEW)   Final Result      Bilateral interstitial opacities may represent interstitial edema or pneumonitis.      Mild cardiomegaly.      Atherosclerotic plaque.              Assessment/Plan  Dysphagia:  -Patient has very weak cough during the day however patient said this is his baseline.  -Noticed significant risk of aspiration evaluated by bedside nurse, me, and speech therapist.  -Per speech therapist recommend patient to have fees test however patient refused.  -Further discussion about the risk of aspiration patient would like to take the risk and eat for pleasure.  -Patient also wants to change CODE STATUS to DNR.  -Patient currently is competent to make medical decision.  We will respect patient wishes and start patient on safest diet currently as dysphagia II with nectar thick.      DNR  -Total time spent on advanced  care planning, excluding time spent on daily care: 16 minutes.    1st 30 minutes [id number]        I discussed extensively with patient and patient's family is regarding code status and plan of care. We also discussed advanced care planning including diagnosis, prognosis, plan of care, risks and benefits of any therapies that could be offered, as well as alternatives including palliation and hospice, as appropriate. My discussion is summarized above in detail. Confirmed with DNR    Acute systolic CHF (congestive heart failure) (HCC)   Assessment & Plan    Echo reveals EF of 35%  New diagnosis  Known CAD with multivessel disease  Mild volume overload undergoing diuresis  Continue coreg and lasix.  Cardiology consulted.  Pending cardiology further recommendation.  Patient currently kidney function not optimal, we will hold of ACE inhibitor for now and monitor patient's kidney function.  Appreciate cardiology recommendation, continue IV Lasix at this moment.  Continue pending ID evaluation for decision of antibiotics.     Diabetic foot infection (HCC)   Assessment & Plan    History of chronic diabetic wounds to bilateral feet.  Has been following with outpatient wound clinic  Recent worsening of wounds with purulent drainage.   Cultures pending.   ID following.  Continue cefepime and linezolid  He found to have possible right foot osteomyelitis on x-ray.  LPS following  Pending surgical I&D.   Awaiting cardiac clearance due to new CHF diagnosis.  Patient currently have no chest pain  Continue pending ID evaluation for decision of antibiotics.  Preliminary culture results showing group D enterococcus.     Venous stasis dermatitis of both lower extremities   Assessment & Plan    Secondary to chronic edema  Continue lasix.  Skin care.      Macrocytosis   Assessment & Plan    TSH, vitamin B12 and folic acid wnl.     Elevated troponin   Assessment & Plan    He found to have mildly elevated troponin.  He denies any symptoms of  chest pain.  EKG stable  ? Stress induced secondary to volume overload  Echo shows EF 35%.  Continue lasix.   Cardiology consulted.      CKD (chronic kidney disease), stage III ([facility])- (present on admission)   Assessment & Plan    History of chronic kidney disease.  Currently renal functions are at the baseline.  Avoid nephrotoxins.  Continue to monitor  Hold off ACE inhibitor for now until clinically patient is more euvolemic and then challenge with small dose     CAD (coronary artery disease)- (present on admission)   Assessment & Plan    Hx of  Continue lipitor and ASA and beta-blocker currently no chest pain.      Mixed hyperlipidemia- (present on admission)   Assessment & Plan    Continue lipitor.      Edema- (present on admission)   Assessment & Plan    Hx of chronic BLE edema.  Worse prior to admit.  Secondary to CHF.  BNP elevated.   LE US negative for DVT  RACHEL negative for arterial disease.   Pending further recommendation by cardiologist.  Patient still showing lower extremity edema.  Cont with lasix     COPD (chronic obstructive pulmonary disease) ([facility])- (present on admission)   Assessment & Plan    Wheezing noted on exam  Denies shortness of breath  Pro curt negative  Continue RT protocol, BT, and supplemental o2.      Type 2 diabetes mellitus with kidney complication, without long-term current use of insulin ([facility])- (present on admission)   Assessment & Plan    Uncontrolled  A1c 10.3  Hold metformin  Continue SSI while in the hospital   Diabetic diet and accuchecks.      Essential hypertension, benign- (present on admission)   Assessment & Plan    Controlled.  Continue coreg.             VTE prophylaxis: Heparin      Current Facility-Administered Medications:   •  furosemide (LASIX) injection 40 mg, 40 mg, Intravenous, BID DIURETIC, Kait Olivier M.D., 40 mg at 06/30/19 9572  •  insulin glargine (LANTUS) injection 10 Units, 10 Units, Subcutaneous, Q EVENING, Elly Zamora M.D.  •  insulin regular (HUMULIN R)  injection 2-9 Units, 2-9 Units, Subcutaneous, TID AC, 3 Units at 06/30/19 1232 **AND** Accu-Chek ACHS, , , Q AC AND BEDTIME(S) **AND** NOTIFY MD and PharmD, , , Once **AND** glucose 4 g chewable tablet 16 g, 16 g, Oral, Q15 MIN PRN **AND** DEXTROSE 10% BOLUS 250 mL, 250 mL, Intravenous, Q15 MIN PRN, Elly Zamora M.D.  •  cefepime (MAXIPIME) 2 g in  mL IVPB, 2 g, Intravenous, Q12HRS, Ryanne Martinez A.P.R.N., Stopped at 06/30/19 0549  •  Respiratory Care per Protocol, , Nebulization, Continuous RT, Jose Monterroso A.P.R.N.  •  linezolid (ZYVOX) tablet 600 mg, 600 mg, Oral, Q12HRS, Jose Monterroso A.P.R.N., 600 mg at 06/30/19 0519  •  senna-docusate (PERICOLACE or SENOKOT S) 8.6-50 MG per tablet 2 Tab, 2 Tab, Oral, BID, 2 Tab at 06/29/19 1656 **AND** polyethylene glycol/lytes (MIRALAX) PACKET 1 Packet, 1 Packet, Oral, QDAY PRN **AND** magnesium hydroxide (MILK OF MAGNESIA) suspension 30 mL, 30 mL, Oral, QDAY PRN **AND** bisacodyl (DULCOLAX) suppository 10 mg, 10 mg, Rectal, QDAY PRN, Andi Winters M.D.  •  heparin injection 5,000 Units, 5,000 Units, Subcutaneous, Q8HRS, Andi Winters M.D., 5,000 Units at 06/30/19 1452  •  acetaminophen (TYLENOL) tablet 650 mg, 650 mg, Oral, Q6HRS PRN, Andi Winters M.D.  •  albuterol (PROVENTIL) 2.5mg/0.5ml nebulizer solution 2.5 mg, 2.5 mg, Nebulization, Q4HRS PRN, Andi Winters M.D.  •  aspirin (ASA) chewable tab 81 mg, 81 mg, Oral, DAILY, Andi Winters M.D., 81 mg at 06/30/19 0519  •  atorvastatin (LIPITOR) tablet 20 mg, 20 mg, Oral, DAILY, Andi Winters M.D., 20 mg at 06/30/19 0519  •  carvedilol (COREG) tablet 3.125 mg, 3.125 mg, Oral, BID WITH MEALS, Andi Winters M.D., 3.125 mg at 06/30/19 0852  •  famotidine (PEPCID) tablet 20 mg, 20 mg, Oral, DAILY, Andi Winters M.D., 20 mg at 06/30/19 0519

## 2019-06-30 NOTE — PROGRESS NOTES
Received pt report from day RN Sami.    Pt awake, alert, oriented X 4.  Pt resting in bed.  Bed in low locked position, call bell at the bedside, tray table & personal belongings within reach. Non-skid footwear intact. White board updated to reflect plan of care for current shift. Pt door tags reviewed. Bed Alarm ON.    Assessed patient’s Neuro Status, CSM BL Feet, Comfort Level, Immediate Needs.    Vitals WNL.    Tele NSR.    Landon Melchor

## 2019-06-30 NOTE — PROGRESS NOTES
FSBG checked at 2100 = 239    The order for HS insulin coverage has been discontinued.  Spoke with hospitalist Dr. Winters, unsure why HS coverage was stopped.  Can recheck patient later and page if high, or recheck in am.      Will recheck in am.    Landon Melchor

## 2019-06-30 NOTE — PROGRESS NOTES
Telemetry Summary:    Measurements: .16/.12/.40       Rhythm: NSR-AFIB @ 2105 back to NSR @ 9642    Ectopy: PVC's & PAC's    Rate: 77-95    Landon Melchor

## 2019-06-30 NOTE — WOUND TEAM
"LPS follow up    [facility]ound [facility] Ostomy [facility]  Inpatient Services  Initial Wound and Skin Care Evaluation    Admission Date: 6/25/2019     [facility], [facility], SH: Reviewed    Unit where seen by Wound Team: [id number]     WOUND CONSULT RELATED TO:  POD dressing change      SUBJECTIVE:  \"That one on the left I've have 7 years.\"      Self Report / Pain Level:  Pain to incision sites under toes        OBJECTIVE:  In bed, surgical dressing in place     WOUND TYPE, LOCATION, CHARACTERISTICS (Pressure Injuries: location, stage, POA or date identified)       Wound 06/25/19 Diabetic Ulcer Foot right plantar (Active)   Site Assessment Pink    Lakeshia-wound Assessment Callused    Margins Attached edges    Wound Length (cm) 3.5 cm    Wound Width (cm) 3 cm    Wound Surface Area (cm^2) 10.5 cm^2    Tunneling 1.5 cm    Closure Secondary intention    Drainage Amount Small    Drainage Description Serosanguineous    Non-staged Wound Description Full thickness    Treatments Cleansed;Site care    Cleansing Normal Saline Irrigation    Periwound Protectant Skin Moisturizer    Dressing Options Hydrofera Blue;Nonadhesive Foam;Hypafix Tape    Dressing Cleansing/Solutions Not Applicable    Dressing Changed New    Dressing Status Clean;Dry;Intact    Dressing Change Frequency Every 72 hrs    NEXT Dressing Change  07/03/19    NEXT Weekly Photo (Inpatient Only) 07/07/19    WOUND NURSE ONLY - Odor None    WOUND NURSE ONLY - Pulses Right;Thready    WOUND NURSE ONLY - Exposed Structures None    WOUND NURSE ONLY - Tissue Type and Percentage 100% pink        Wound 06/26/19 Diabetic Ulcer Foot left plantar (Active)   Site Assessment Pink;Red    Lakeshia-wound Assessment Callused    Margins Attached edges    Wound Length (cm) 1 cm    Wound Width (cm) 2 cm    Wound Depth (cm) 1 cm    Wound Surface Area (cm^2) 2 cm^2    Closure Secondary intention    Drainage Amount Small    Drainage Description Serosanguineous    Non-staged Wound Description Full thickness    Treatments " Cleansed;Site care    Cleansing Approved Wound Cleanser    Periwound Protectant Skin Moisturizer    Dressing Options Hydrofera Blue;Nonadhesive Foam;Hypafix Tape    Dressing Cleansing/Solutions Not Applicable    Dressing Changed New    Dressing Status Clean;Dry;Intact    Dressing Change Frequency Every 72 hrs    NEXT Dressing Change  07/03/19    NEXT Weekly Photo (Inpatient Only) 07/07/19    WOUND NURSE ONLY - Odor None    WOUND NURSE ONLY - Pulses Left;Thready    WOUND NURSE ONLY - Exposed Structures None    WOUND NURSE ONLY - Tissue Type and Percentage 100% pink    WOUND NURSE ONLY - Time Spent with Patient (mins) 60          Vascular:    Arterial studies:   Right.    There is no evidence of arterial disease demonstrated on the right side.    Toe brachial index is normal; 0.83.   Doppler waveforms of the common femoral, popliteal, and dorsalis pedis    arteries are of high amplitude and biphasic.    Doppler waveform of the posterior tibial artery is triphasic.     Left.    Evidence of calcified arteries on the left side make assessment unreliable.    Toe brachial index shows evidence of calcified arteries; 1.18.   Doppler waveforms of the common femoral, popliteal, and posterior tibial    arteries are of high amplitude and biphasic.    Doppler waveform of the dorsalis pedis artery is triphasic.    Lab Values:    WBC:       WBC   Date/Time Value Ref Range Status   06/30/2019 12:31 AM 5.2 4.8 - 10.8 K/uL Final     A1C:      Lab Results   Component Value Date/Time    HBA1C 10.3 (H) 06/26/2019 01:13 AM     Culture:   Obtained 06/28, Results Group D Enterococcus species Rare growth     INTERVENTIONS BY WOUND TEAM:  Previous dressings removed, wounds/incisions cleansed with NS and gauze, new photo and measurements taken.  sween 24 daily moisturizer to reggie-wound, janet filled into wound beds, moistened with NS. Covered with hydrofera blue, non adhesive foam and secured with hypafix tape.  Dry gauze under toes, adhesive  foams to cover posterior leg incisions.  Daily moisturizer to legs and tubi  E applied.  Heels floated with float boots, RN going to give patient a bath and agreed to apply boots.  Patient had been refusing but after education and encouragement is agreeable to wearing them.      Dressing selection:  Janet, hydrofera blue, non adhesive foam and hypafix tape.          Interdisciplinary consultation: Patient, Bedside RN (Kathrin)    EVALUATION: patient had surgery with Dr. Cooper 06/28.  Wounds are clean but chronic.  Applied janet to encourage wounds to heal,  hydrofera blue for antimicrobial and absorption.      Factors affecting wound healing: CHF, DM, venous stasis, CKD, CAD  Goals: Steady decrease in wound area and depth weekly.    NURSING PLAN OF CARE ORDERS (X):    Dressing changes: See Dressing Care orders: X  Skin care: See Skin Care orders: X  Rectal tube care: See Rectal Tube Care orders:   Other orders:    RSKIN: CURRENT (X) ORDERED (O):   Q shift Michael:  X  Q shift pressure point assessments:  X  Pressure redistribution mattress            ABHIJEET          Bariatric ABHIJEET         Bariatric foam           Heel float boots        X  Float Heels off Bed with Pillows               Barrier wipes         Barrier Cream         Barrier paste          Sacral silicone dressing       PRN  Silicone O2 tubing         Anchorfast         Cannula fixation Device (Tender )          Gray Foam Ear protectors           Trach with Optifoam split foam                 Waffle cushion        Waffle Overlay       X  Rectal tube or BMS         Antifungal tx      Interdry          Reposition q 2 hours      X  Up to chair        Ambulate      PT/OT        Dietician        Diabetes Education      PO     TF     TPN     NPO   # days   Other        WOUND TEAM PLAN OF CARE (X):   NPWT change 3 x week:        Dressing changes by wound team:       Follow up as needed:     X LPS to follow   Other (explain):     Anticipated discharge  plans (X): will need ongoing wound care post DC   SNF:           Home Care:         X  Outpatient Wound Center:            Self Care:            Other:

## 2019-06-30 NOTE — PROGRESS NOTES
2 RN skin check complete w/ Montserrat.   Devices in place oxygen tubing, ACE wrapped feet and lower legs,  CAM boots, foam dressings on elbows and knees.  Skin assessed under devices ears pink/red, blanching. Healing scabs on elbows and knees.  Confirmed pressure ulcers found on B/L plantar surface of feet, and 2nd and third left toes.   New potential pressure ulcers noted on None. Wound consult placed YES.  Groin redness secondary to IAD, brusing on upper extremeties.  The following interventions in place silicone nasal cannula, waffle mattress, heel float boots when not in CAM boots or other positional boots, foam dressings, barrier wipes, barrier cream, Q2 turns.

## 2019-06-30 NOTE — CARE PLAN
Problem: Communication  Goal: The ability to communicate needs accurately and effectively will improve  Outcome: PROGRESSING AS EXPECTED  Discussed POC with patient, patient agrees to participate in POC.  Patient encouraged to use call bell to ring for assistance.  Patient oriented to room, call bell, and bed controls.  Open line of communication established with the patient.        Problem: Safety  Goal: Will remain free from injury  Outcome: PROGRESSING AS EXPECTED  Instructed patient to use call bell and ring for assistance prior to ambulation.  Non-Skid foot ware in place, bed in low locked position, call bell and personal belongings within reach.        Problem: Infection  Goal: Will remain free from infection  Outcome: PROGRESSING AS EXPECTED  BL Foot Ulcer Cultures are negative this far.      Problem: Venous Thromboembolism (VTW)/Deep Vein Thrombosis (DVT) Prevention:  Goal: Patient will participate in Venous Thrombosis (VTE)/Deep Vein Thrombosis (DVT)Prevention Measures  Outcome: PROGRESSING AS EXPECTED  SQ Heparin for VTE    Problem: Knowledge Deficit  Goal: Knowledge of disease process/condition, treatment plan, diagnostic tests, and medications will improve  Outcome: PROGRESSING AS EXPECTED  Discussed plan of care and medications with patient.  Patient verbalizes understandings of treatment regimen.        Problem: Respiratory:  Goal: Respiratory status will improve  Outcome: PROGRESSING AS EXPECTED  Weak Cough, Needs coaching, shallow breathing, patient on 2-3 liters of oxygen.      Problem: Mobility  Goal: Risk for activity intolerance will decrease  Outcome: PROGRESSING AS EXPECTED  BL LE WBAT with boots.      Problem: Skin Integrity  Goal: Risk for impaired skin integrity will decrease  Outcome: PROGRESSING AS EXPECTED  See Skin Note    Problem: Urinary Elimination:  Goal: Ability to reestablish a normal urinary elimination pattern will improve  Outcome: PROGRESSING AS EXPECTED  Patient is incontinent  at times     Problem: Pain Management  Goal: Pain level will decrease to patient's comfort goal  Outcome: PROGRESSING AS EXPECTED  Pt complains of slight discomfort in BL LE.  Also mild numbness, due to neuropathy.

## 2019-06-30 NOTE — CARE PLAN
Problem: Venous Thromboembolism (VTW)/Deep Vein Thrombosis (DVT) Prevention:  Goal: Patient will participate in Venous Thrombosis (VTE)/Deep Vein Thrombosis (DVT)Prevention Measures  Pt. Is on unfractionated heparin; pt.  Worked with PT to get feet to floor yesterday; will continue to work on early ambulation     Problem: Knowledge Deficit  Goal: Knowledge of disease process/condition, treatment plan, diagnostic tests, and medications will improve  Outcome: NOT MET    Intervention: Assess knowledge level of disease process/condition, treatment plan, diagnostic tests, and medications  Will continue to reinforce education and ensure continuity of care

## 2019-06-30 NOTE — PROGRESS NOTES
Patient attempted to wear BL [employer]fa Boots with kickstands, traction came down to place boots.  Patient wore boots for approx 1 hr and demanded them to be removed.      Landon Melchor

## 2019-06-30 NOTE — PROGRESS NOTES
2 RN skin check complete.      Devices in place Waffle Mattress, Q2hr Turns, BL Heel Boots, Foam Pads to BL Elbows, Adhesive Foam to Right Knee, Barrier Cream, Interdry to ABD Folds, Incontinence care provided.     Wound consult placed ALREADY ORDERED/Completed.    2-RN skin assessment completed this shift with RN April.  Skin was assessed for abnormalities head to toe and noted in detail skin note.       FACE/NECK: Scab on Forehead, otherwise Intact     BACK of HEAD: Itnact     EARS: BL-Ears Blanching-Intact      CHEST: Intact     ABDOMEN: Intact-Abdominal Hernia      BACK: Intact     BUTTOCKS/SACRUM: Red & Blanching     GROIN:  Excoriated Reddened-Skin Intact      ARMS: Left Elbow Red Blanching Scab-New Foam Dressing Applied, Right Elbow Fragile-Skin Tear-New Foam Dressing Applied, Swollen, Reddened, Foam Pad Placed on BL Elbows.       LEGS: Scattered Scabs on BL LE,  BLLE reddened, flaky, dry, cellulitis in appearance       Right Knee:  Wound/Abraisions-cleansed and new foam dressing applied.       FEET: 2nd Digit Left Foot, dry flaky in appearance not open, skin intact   Right Plantar Foot: Unstageable Diabetic Foot Ulcer  Left Plantar Foot:  Stage II Diabetic Foot Ulcer     Post Op Dressings.  Did not remove.       This completes the 2-RN skin assessment.

## 2019-06-30 NOTE — PROGRESS NOTES
Bedside report received. Patient A&O x4. Complains of no pain, declines medication. POC discussed with patient. Patient verbalized understanding. Call light and belongings within reach. Bed locked and in lowest position, alarm and fall precautions in place.

## 2019-06-30 NOTE — PROGRESS NOTES
Notified by tele converted to AFIB , pt has a history of AFIB.  EKG ordered to confirm.        EKG 2155 Confirmed AFIB HR 87    0005:  Patient converted back to NSR HR in 80's.        Landon Melchor

## 2019-06-30 NOTE — PROGRESS NOTES
Cardiology Follow Up Progress Note    Date of Service  6/30/2019    Attending Physician  Elly Zamora M.D.    Reason for consultation     New cardiomyopathy based on echocardiogram 6/27/19, LVEF 35%, echo in 2017 showed EF of 60% patient presented with volume overload, BNP 1300    History of     Diabetes type II, COPD, chronic kidney disease stage III, hypertension, known coronary artery disease underwent coronary angiography in 2015 showed multivessel CAD, not amenable to PCI or CABG    Admitted for     Bilateral lower extremity edema, worsening for the past 3 to 4 weeks, with increasing erythema, he follows with outpatient wound care for chronic nonhealing bilateral diabetic foot wound, left foot with purulence drainage on presentation  Interim Events    6/30/19 currently normal sinus rhythm, overnight in and out of A. fib, rate controlled, asymptomatic, denies chest discomfort, no dyspnea, encouraged out of bed if okay with Ortho surgeon    Review of Systems  Review of Systems   Constitutional: Positive for fatigue.   Respiratory: Positive for cough and wheezing. Negative for apnea, choking, chest tightness, shortness of breath and stridor.    Cardiovascular: Positive for leg swelling. Negative for chest pain.       Vital signs in last 24 hours  Temp:  [36.5 °C (97.7 °F)-36.8 °C (98.2 °F)] 36.5 °C (97.7 °F)  Pulse:  [] 79  Resp:  [16-18] 16  BP: (105-126)/(64-78) 125/69  SpO2:  [93 %-97 %] 97 %    Physical Exam  Physical Exam   Constitutional: He is oriented to person, place, and time.   HENT:   Head: Normocephalic.   Eyes: Conjunctivae are normal.   Neck: No thyromegaly present.   Cardiovascular: Normal rate and regular rhythm.    Pulses:       Carotid pulses are 2+ on the right side, and 2+ on the left side.       Radial pulses are 2+ on the right side, and 2+ on the left side.   Pulmonary/Chest: He has wheezes.   Abdominal: Soft.   Musculoskeletal: He exhibits edema.   Neurological: He is alert and oriented  to person, place, and time.   Skin: Skin is warm and dry.   Erythema to bilateral feet, dressing intact       Lab Review  Lab Results   Component Value Date/Time    WBC 5.2 06/30/2019 12:31 AM    RBC 3.26 (L) 06/30/2019 12:31 AM    HEMOGLOBIN 9.5 (L) 06/30/2019 12:31 AM    HEMATOCRIT 32.3 (L) 06/30/2019 12:31 AM    MCV 99.1 (H) 06/30/2019 12:31 AM    MCH 29.1 06/30/2019 12:31 AM    MCHC 29.4 (L) 06/30/2019 12:31 AM    MPV 10.8 06/30/2019 12:31 AM      Lab Results   Component Value Date/Time    SODIUM 138 06/30/2019 12:31 AM    POTASSIUM 4.1 06/30/2019 12:31 AM    CHLORIDE 105 06/30/2019 12:31 AM    CO2 28 06/30/2019 12:31 AM    GLUCOSE 244 (H) 06/30/2019 12:31 AM    BUN 29 (H) 06/30/2019 12:31 AM    CREATININE 1.45 (H) 06/30/2019 12:31 AM    BUNCREATRAT 26 (H) 04/18/2018 02:30 PM      Lab Results   Component Value Date/Time    ASTSGOT 14 06/25/2019 12:37 PM    ALTSGPT 20 06/25/2019 12:37 PM     Lab Results   Component Value Date/Time    CHOLSTRLTOT 196 12/26/2017 11:41 AM     (H) 12/26/2017 11:41 AM    HDL 48 12/26/2017 11:41 AM    TRIGLYCERIDE 88 12/26/2017 11:41 AM    TROPONINI 0.12 (H) 06/27/2019 03:03 AM       Recent Labs      06/28/19   0553  06/29/19   0355   BNPBTYPENAT  1345*  994*         Assessment/Plan    New diagnosis of cardiomyopathy, LVEF 35% based on echo 6/27/19, plan for medical therapy, in 2015 coronary angiography revealed multivessel CAD that was not amenable to PCI and or CABG.  Secondary pulmonary hypertension, RVSP 45 mmHg.  Diabetic foot ulcers, osteomyelitis, underwent I&D 6/28/19, on antibiotic therapy.  Presented with fluid overload, standing daily weight requested  Paroxysmal atrial fibrillation, in and out of A. fib overnight, not on oral anticoagulation, previously on warfarin, no OAC since 2015, discussed with family and patient about this, presumably there was a question about the accuracy of PAF diagnosis)    Plan is to continue with optimal guideline directed medical  therapy  Continue with furosemide 40 IV twice a day  Daily weight  Strict intake and output  Currently on aspirin 81, Lipitor 20, carvedilol 3.125 mg BID,  uptitrate as blood pressure permits  ACE I inhibitors and spironolactone when renal insufficiency improves  Assess EKG and tele Monitur for PAF overnight

## 2019-06-30 NOTE — PROGRESS NOTES
Pt refusing to wear surgeon recommended boots due to pain over the lateral and medial ankle bones B/L. Multiple attempts were made to adjust boots to comfort, but unable to accommodate patient's comfort.  Traction will be notified to hopefully help improve boot fit to comfort for patient or offer an alternative.  Pt is in heel float boots in the meantime and is NWB w/out boots in place.

## 2019-07-01 PROBLEM — R13.12 OROPHARYNGEAL DYSPHAGIA: Status: ACTIVE | Noted: 2019-07-01

## 2019-07-01 LAB
ANION GAP SERPL CALC-SCNC: 5 MMOL/L (ref 0–11.9)
BACTERIA SPEC ANAEROBE CULT: NORMAL
BACTERIA TISS AEROBE CULT: ABNORMAL
BACTERIA TISS AEROBE CULT: ABNORMAL
BACTERIA TISS AEROBE CULT: NORMAL
BASOPHILS # BLD AUTO: 0.3 % (ref 0–1.8)
BASOPHILS # BLD: 0.02 K/UL (ref 0–0.12)
BUN SERPL-MCNC: 32 MG/DL (ref 8–22)
CALCIUM SERPL-MCNC: 8.7 MG/DL (ref 8.5–10.5)
CHLORIDE SERPL-SCNC: 104 MMOL/L (ref 96–112)
CO2 SERPL-SCNC: 31 MMOL/L (ref 20–33)
COMMENT 1642: NORMAL
CREAT SERPL-MCNC: 1.44 MG/DL (ref 0.5–1.4)
EKG IMPRESSION: NORMAL
EOSINOPHIL # BLD AUTO: 0.16 K/UL (ref 0–0.51)
EOSINOPHIL NFR BLD: 2.7 % (ref 0–6.9)
ERYTHROCYTE [DISTWIDTH] IN BLOOD BY AUTOMATED COUNT: 53.1 FL (ref 35.9–50)
GLUCOSE BLD-MCNC: 141 MG/DL (ref 65–99)
GLUCOSE BLD-MCNC: 163 MG/DL (ref 65–99)
GLUCOSE BLD-MCNC: 182 MG/DL (ref 65–99)
GLUCOSE SERPL-MCNC: 233 MG/DL (ref 65–99)
GRAM STN SPEC: ABNORMAL
GRAM STN SPEC: NORMAL
HCT VFR BLD AUTO: 31.9 % (ref 42–52)
HGB BLD-MCNC: 9.3 G/DL (ref 14–18)
IMM GRANULOCYTES # BLD AUTO: 0.03 K/UL (ref 0–0.11)
IMM GRANULOCYTES NFR BLD AUTO: 0.5 % (ref 0–0.9)
LYMPHOCYTES # BLD AUTO: 0.9 K/UL (ref 1–4.8)
LYMPHOCYTES NFR BLD: 15.1 % (ref 22–41)
MCH RBC QN AUTO: 29.2 PG (ref 27–33)
MCHC RBC AUTO-ENTMCNC: 29.2 G/DL (ref 33.7–35.3)
MCV RBC AUTO: 100.3 FL (ref 81.4–97.8)
MONOCYTES # BLD AUTO: 0.76 K/UL (ref 0–0.85)
MONOCYTES NFR BLD AUTO: 12.7 % (ref 0–13.4)
MORPHOLOGY BLD-IMP: NORMAL
NEUTROPHILS # BLD AUTO: 4.11 K/UL (ref 1.82–7.42)
NEUTROPHILS NFR BLD: 68.7 % (ref 44–72)
NRBC # BLD AUTO: 0 K/UL
NRBC BLD-RTO: 0 /100 WBC
PLATELET # BLD AUTO: 176 K/UL (ref 164–446)
PLATELET BLD QL SMEAR: NORMAL
PMV BLD AUTO: 10.6 FL (ref 9–12.9)
POLYCHROMASIA BLD QL SMEAR: NORMAL
POTASSIUM SERPL-SCNC: 4 MMOL/L (ref 3.6–5.5)
RBC # BLD AUTO: 3.18 M/UL (ref 4.7–6.1)
RBC BLD AUTO: PRESENT
SIGNIFICANT IND 70042: ABNORMAL
SIGNIFICANT IND 70042: NORMAL
SIGNIFICANT IND 70042: NORMAL
SITE SITE: ABNORMAL
SITE SITE: NORMAL
SITE SITE: NORMAL
SODIUM SERPL-SCNC: 140 MMOL/L (ref 135–145)
SOURCE SOURCE: ABNORMAL
SOURCE SOURCE: NORMAL
SOURCE SOURCE: NORMAL
WBC # BLD AUTO: 6 K/UL (ref 4.8–10.8)

## 2019-07-01 PROCEDURE — 700102 HCHG RX REV CODE 250 W/ 637 OVERRIDE(OP): Performed by: INTERNAL MEDICINE

## 2019-07-01 PROCEDURE — 700111 HCHG RX REV CODE 636 W/ 250 OVERRIDE (IP): Performed by: INTERNAL MEDICINE

## 2019-07-01 PROCEDURE — 700111 HCHG RX REV CODE 636 W/ 250 OVERRIDE (IP): Performed by: NURSE PRACTITIONER

## 2019-07-01 PROCEDURE — A9270 NON-COVERED ITEM OR SERVICE: HCPCS | Performed by: INTERNAL MEDICINE

## 2019-07-01 PROCEDURE — 93005 ELECTROCARDIOGRAM TRACING: CPT | Performed by: INTERNAL MEDICINE

## 2019-07-01 PROCEDURE — 85025 COMPLETE CBC W/AUTO DIFF WBC: CPT

## 2019-07-01 PROCEDURE — 700102 HCHG RX REV CODE 250 W/ 637 OVERRIDE(OP): Performed by: NURSE PRACTITIONER

## 2019-07-01 PROCEDURE — 82962 GLUCOSE BLOOD TEST: CPT

## 2019-07-01 PROCEDURE — 700105 HCHG RX REV CODE 258: Performed by: NURSE PRACTITIONER

## 2019-07-01 PROCEDURE — 770006 HCHG ROOM/CARE - MED/SURG/GYN SEMI*

## 2019-07-01 PROCEDURE — 80048 BASIC METABOLIC PNL TOTAL CA: CPT

## 2019-07-01 PROCEDURE — 700105 HCHG RX REV CODE 258: Performed by: INTERNAL MEDICINE

## 2019-07-01 PROCEDURE — 36415 COLL VENOUS BLD VENIPUNCTURE: CPT

## 2019-07-01 PROCEDURE — A9270 NON-COVERED ITEM OR SERVICE: HCPCS | Performed by: NURSE PRACTITIONER

## 2019-07-01 PROCEDURE — 99233 SBSQ HOSP IP/OBS HIGH 50: CPT | Performed by: INTERNAL MEDICINE

## 2019-07-01 PROCEDURE — 302043 TAPE, HYPAFIX: Performed by: INTERNAL MEDICINE

## 2019-07-01 PROCEDURE — 93010 ELECTROCARDIOGRAM REPORT: CPT | Performed by: INTERNAL MEDICINE

## 2019-07-01 PROCEDURE — 99232 SBSQ HOSP IP/OBS MODERATE 35: CPT | Performed by: INTERNAL MEDICINE

## 2019-07-01 RX ADMIN — LINEZOLID 600 MG: 600 TABLET, FILM COATED ORAL at 06:05

## 2019-07-01 RX ADMIN — TORSEMIDE 100 MG: 100 TABLET ORAL at 06:06

## 2019-07-01 RX ADMIN — INSULIN HUMAN 2 UNITS: 100 INJECTION, SOLUTION PARENTERAL at 17:01

## 2019-07-01 RX ADMIN — ASPIRIN 81 MG 81 MG: 81 TABLET ORAL at 06:04

## 2019-07-01 RX ADMIN — AMPICILLIN SODIUM AND SULBACTAM SODIUM 3 G: 2; 1 INJECTION, POWDER, FOR SOLUTION INTRAMUSCULAR; INTRAVENOUS at 17:03

## 2019-07-01 RX ADMIN — CARVEDILOL 6.25 MG: 6.25 TABLET, FILM COATED ORAL at 17:04

## 2019-07-01 RX ADMIN — ATORVASTATIN CALCIUM 20 MG: 20 TABLET, FILM COATED ORAL at 06:05

## 2019-07-01 RX ADMIN — LOSARTAN POTASSIUM 25 MG: 25 TABLET ORAL at 06:26

## 2019-07-01 RX ADMIN — INSULIN HUMAN 2 UNITS: 100 INJECTION, SOLUTION PARENTERAL at 12:58

## 2019-07-01 RX ADMIN — HEPARIN SODIUM 5000 UNITS: 5000 INJECTION, SOLUTION INTRAVENOUS; SUBCUTANEOUS at 21:46

## 2019-07-01 RX ADMIN — CEFEPIME 2 G: 2 INJECTION, POWDER, FOR SOLUTION INTRAVENOUS at 06:10

## 2019-07-01 RX ADMIN — HEPARIN SODIUM 5000 UNITS: 5000 INJECTION, SOLUTION INTRAVENOUS; SUBCUTANEOUS at 13:36

## 2019-07-01 RX ADMIN — HEPARIN SODIUM 5000 UNITS: 5000 INJECTION, SOLUTION INTRAVENOUS; SUBCUTANEOUS at 06:07

## 2019-07-01 RX ADMIN — INSULIN GLARGINE 10 UNITS: 100 INJECTION, SOLUTION SUBCUTANEOUS at 17:01

## 2019-07-01 RX ADMIN — CARVEDILOL 6.25 MG: 6.25 TABLET, FILM COATED ORAL at 06:04

## 2019-07-01 RX ADMIN — FAMOTIDINE 20 MG: 20 TABLET ORAL at 06:05

## 2019-07-01 ASSESSMENT — ENCOUNTER SYMPTOMS
WEAKNESS: 0
FALLS: 0
HEARTBURN: 0
TINGLING: 0
ABDOMINAL PAIN: 0
PALPITATIONS: 0
SPUTUM PRODUCTION: 0
MYALGIAS: 0
DIZZINESS: 0
SHORTNESS OF BREATH: 0
WHEEZING: 0
NECK PAIN: 0
CONSTIPATION: 0
DEPRESSION: 0
CHILLS: 0
DIARRHEA: 0
COUGH: 0
CLAUDICATION: 0
NAUSEA: 0
FEVER: 0
EYE DISCHARGE: 0
COUGH: 1
PHOTOPHOBIA: 0
BLURRED VISION: 0
VOMITING: 0

## 2019-07-01 ASSESSMENT — LIFESTYLE VARIABLES: SUBSTANCE_ABUSE: 0

## 2019-07-01 NOTE — DISCHARGE PLANNING
Received Choice form at 0884  Agency/Facility Name: [facility]  Referral sent per Choice form at 0833

## 2019-07-01 NOTE — PROGRESS NOTES
[facility] Medicine Daily Progress Note    Date of Service  7/1/2019    Chief Complaint  79 y.o. male admitted 6/25/2019 with BLE swelling and diabetic foot infection.    Hospital Course    H/O COPD, CAD, HTN, DM, HLD.  Patient follows with outpatient wound care for chronic, nonhealing bilateral diabetic foot wounds.  Presented to ED after developing worsening erythema and swelling of BLE as well as purulent drainage from foot wounds.  Started on antibiotics.  ID and LPS consulted.  Also found to have mildly elevated troponin with stable EKG.  Suspect volume overload with elevated BNP and edema noted on chest x-ray.  Continue diuresis.  Echo revealing new CHF with EF 35%.  Cardiology consulted.  Pending surgical I&D.  Will need cardiac clearance prior to surgery.          Interval Problem Update  6/26:  Bilateral foot wounds noted with purulent drainage.  Dressing intact.  Denies pain.  Improvement of BLE edema with diuresis, although noted to have stasis dermatitis.  Wheezing noted on ausculation.  Denies shortness of breath.  Pro curt negative.  Bilateral opacities noted on CXR.  Echo pending.   6/27:  Echo reveals EF 35%.  New HF diagnosis.  Cardiology consulted.  Edema overall improved with diuresis.  Troponin trending down.  No chest pain.  Pending surgical I&D.  Will need cardiac clearance prior.  Hgb A1c 10.3.  Started on SSI.     6/28.  Patient has been n.p.o. to prepare for surgery today.  Patient agreed with surgery.  Patient's breathing condition improved after diuretics.  Patient complains of local pain. Patient's pain is local, 4-6/10, intermittent and does not radiate to other location, sharp and with some tingling. Can be controlled by pain meds. Dressing in place.  6/29.  Patient tolerated procedure very well.  Patient was noticed to have been choking on food in the morning.  Speech evaluation recommended.  Recommend cautiously with oral intake until speech evaluation.  Recommend current status as  n.p.o. and sips with medication.  Patient's lower extremity pain still present. Patient's pain is local, 6-8/10, intermittent and does not radiate to other location, sharp and with some tingling. Can be controlled by pain meds. Dressing in place.  6/30. Patient is pleasant and able to tolerate p.o. currently without significant shortness of breath.  Risk of benefit for using fees test or eventually needs tube feeding given to the patient and patient declined fees tube again test again.  Patient is alert and oriented and does have capacity to make medical decision.  Explained patient to the patient's family at bedside. Patient otherwise denies fever, chills, nausea, vomiting, adb pain, SOB, CP, headache, constipation, diarrhea, cough, or sputum.  7/1.  Patient tolerated current diet but still showing signs of frequent cough.  Unclear if this is chronic cough although aspiration however patient definitely carried high risk of aspiration.  Previously long discussion regarding choice of oral intake and patient choose to eat with pleasure knowing the risk.  Patient's CODE STATUS already changed to DNR.  Patient still complains of lower extremity edema and tenderness and pain. Patient's pain is local 3-4/10, intermittent and does not radiate to other location, sharp and with some tingling. Can be controlled by pain meds. Dressing in place.    Consultants/Specialty  ID  LPS  Cardiology    Code Status  DNR    Disposition  SNF    Review of Systems  Review of Systems   Constitutional: Negative for chills and fever.   HENT: Negative for congestion, ear pain, hearing loss and nosebleeds.    Eyes: Negative for blurred vision, photophobia and discharge.   Respiratory: Positive for cough. Negative for sputum production, shortness of breath and wheezing.    Cardiovascular: Positive for leg swelling. Negative for chest pain, palpitations and claudication.   Gastrointestinal: Negative for abdominal pain, constipation, heartburn and  vomiting.   Genitourinary: Negative for dysuria, frequency and hematuria.   Musculoskeletal: Negative for falls, myalgias and neck pain.   Skin: Negative for itching and rash.        BLE swelling and erythema.  Bilateral foot wounds.    Neurological: Negative for dizziness, tingling and weakness.   Psychiatric/Behavioral: Negative for depression and substance abuse.        Physical Exam  Temp:  [36 °C (96.8 °F)-36.8 °C (98.3 °F)] 36.2 °C (97.1 °F)  Pulse:  [77-88] 78  Resp:  [18-20] 20  BP: (101-128)/(59-72) 127/59  SpO2:  [93 %-100 %] 100 %    Physical Exam   Constitutional: He is oriented to person, place, and time. He appears well-developed.   Obese male.    HENT:   Head: Normocephalic and atraumatic.   Eyes: Pupils are equal, round, and reactive to light. Conjunctivae and EOM are normal. Right eye exhibits no discharge. Left eye exhibits no discharge.   Neck: Normal range of motion. Neck supple. No thyromegaly present.   Cardiovascular: Normal rate, regular rhythm and normal heart sounds.  Exam reveals no friction rub.    BLE edema   Pulmonary/Chest: Effort normal. No stridor. No respiratory distress. He has decreased breath sounds in the right lower field and the left lower field. He has wheezes in the right lower field, the left upper field, the left middle field and the left lower field. He exhibits no tenderness.   Patient's cough is weak and patient showing signs of aspiration   Abdominal: Soft. Bowel sounds are normal. He exhibits no distension. There is no tenderness. There is no guarding.   Musculoskeletal: Normal range of motion. He exhibits edema and tenderness. He exhibits no deformity.   Lymphadenopathy:     He has no cervical adenopathy.   Neurological: He is alert and oriented to person, place, and time. He displays normal reflexes.   Skin: Skin is warm and dry. No rash noted. There is erythema.   Stasis dermatitis noted to BLE   Bilateral diabetic foot wounds with purulent drainage.  Dressing  intact.    Psychiatric: He has a normal mood and affect.   Nursing note and vitals reviewed.      Fluids    Intake/Output Summary (Last 24 hours) at 07/01/19 1443  Last data filed at 07/01/19 1431   Gross per 24 hour   Intake              240 ml   Output             1150 ml   Net             -910 ml       Laboratory  Recent Labs      06/29/19   0355  06/30/19   0031  07/01/19   0019   WBC  7.0  5.2  6.0   RBC  3.51*  3.26*  3.18*   HEMOGLOBIN  10.6*  9.5*  9.3*   HEMATOCRIT  34.8*  32.3*  31.9*   MCV  99.1*  99.1*  100.3*   MCH  30.2  29.1  29.2   MCHC  30.5*  29.4*  29.2*   RDW  51.9*  52.5*  53.1*   PLATELETCT  204  189  176   MPV  10.4  10.8  10.6     Recent Labs      06/29/19   0355  06/30/19   0031  07/01/19   0019   SODIUM  144  138  140   POTASSIUM  4.5  4.1  4.0   CHLORIDE  105  105  104   CO2  27  28  31   GLUCOSE  198*  244*  233*   BUN  32*  29*  32*   CREATININE  1.49*  1.45*  1.44*   CALCIUM  8.9  8.6  8.7         Recent Labs      06/29/19   0355   BNPBTYPENAT  994*           Imaging  US-RACHEL SINGLE LEVEL BILAT   Final Result      EC-ECHOCARDIOGRAM COMPLETE W/ CONT   Final Result      US-EXTREMITY VENOUS LOWER BILAT   Final Result      DX-SHOULDER 2+ RIGHT   Final Result      1.  No acute findings.      2.  Severe degenerative change in the right acromioclavicular joint.      DX-FOOT-COMPLETE 3+ RIGHT   Final Result      1.  Lucency in the right fifth toe, possibly representing osteomyelitis. If clinically indicated, MRI could be performed for confirmation.      2.  Soft tissue edema within the forefoot.      DX-FOOT-COMPLETE 3+ LEFT   Final Result      1.  No definitive evidence for osteomyelitis.      2.  Forefoot deformities as described.      DX-CHEST-PORTABLE (1 VIEW)   Final Result      Bilateral interstitial opacities may represent interstitial edema or pneumonitis.      Mild cardiomegaly.      Atherosclerotic plaque.              Assessment/Plan    Acute systolic CHF (congestive heart failure)  ([facility])   Assessment & Plan    Echo reveals EF of 35%  New diagnosis  Known CAD with multivessel disease  Mild volume overload undergoing diuresis  Continue coreg   Continue diuretics, changed to torsemide by cardiologist.  Cardiology consulted.  Pending cardiology further recommendation.  Patient currently kidney function not optimal, we will hold of ACE inhibitor for now and monitor patient's kidney function.  Appreciate cardiology recommendation, continue IV Lasix at this moment.    Patient kidney remained stable and diuresing very well     Diabetic foot infection ([facility])   Assessment & Plan    History of chronic diabetic wounds to bilateral feet.  Has been following with outpatient wound clinic  Recent worsening of wounds with purulent drainage.   Cultures pending.   ID following.  Continue cefepime and linezolid  He found to have possible right foot osteomyelitis on x-ray.  LPS following  Pending surgical I&D.   Awaiting cardiac clearance due to new CHF diagnosis.  Patient currently have no chest pain  Preliminary culture results showing group D enterococcus.  Should be covered by linezolid.  Continue current medication.  May require prolonged course of IV antibiotics pending ID recommendation.       Oropharyngeal dysphagia- (present on admission)   Assessment & Plan    -Patient has very weak cough during the day however patient said this is his baseline.  -Noticed significant risk of aspiration evaluated by bedside nurse, me, and speech therapist.  -Per speech therapist recommend patient to have fees test however patient refused.  -Further discussion about the risk of aspiration patient would like to take the risk and eat for pleasure.  -Patient also wants to change CODE STATUS to DNR.  -Patient currently is competent to make medical decision.  We will respect patient wishes and start patient on safest diet currently as dysphagia II with nectar thick.    Patient currently tolerated oral intake.  Continue modified  diet to reduce relatively risk of aspiration     DNR (do not resuscitate)   Assessment & Plan    Patient is competent to make medical decision.  CODE STATUS remain on DNR     Venous stasis dermatitis of both lower extremities   Assessment & Plan    Secondary to chronic edema  Continue diuretics, changed to torsemide by cardiologist.  Skin care.      Macrocytosis   Assessment & Plan    TSH, vitamin B12 and folic acid wnl.     Elevated troponin   Assessment & Plan    He found to have mildly elevated troponin.  He denies any symptoms of chest pain.  EKG stable  ? Stress induced secondary to volume overload  Echo shows EF 35%.  Continue diuretics, changed to torsemide by cardiologist.  Cardiology consulted.        CKD (chronic kidney disease), stage III (Formerly [facility])- (present on admission)   Assessment & Plan    History of chronic kidney disease.  Currently renal functions are at the baseline.  Avoid nephrotoxins.  Continue to monitor  Kidney function remained stable.     CAD (coronary artery disease)- (present on admission)   Assessment & Plan    Hx of  Continue lipitor and ASA.      Mixed hyperlipidemia- (present on admission)   Assessment & Plan    Continue lipitor.      Edema- (present on admission)   Assessment & Plan    Hx of chronic BLE edema.  Worse prior to admit.  Secondary to CHF.  BNP elevated.   LE US negative for DVT  RACHEL negative for arterial disease.   Continue diuretics, changed to torsemide by cardiologist.       COPD (chronic obstructive pulmonary disease) ([facility])- (present on admission)   Assessment & Plan    Wheezing noted on exam  Denies shortness of breath  Pro curt negative  Continue RT protocol, BT, and supplemental o2.      Type 2 diabetes mellitus with kidney complication, without long-term current use of insulin ([facility])- (present on admission)   Assessment & Plan    Uncontrolled  A1c 10.3  Hold metformin  Continue SSI while in the hospital   Diabetic diet and accuchecks.      Essential hypertension,  benign- (present on admission)   Assessment & Plan    Blood pressure stable  Continue home medication               VTE prophylaxis: Heparin      Current Facility-Administered Medications:   •  insulin glargine (LANTUS) injection 10 Units, 10 Units, Subcutaneous, Q EVENING, Elly Zamora M.D., 10 Units at 06/30/19 1748  •  carvedilol (COREG) tablet 6.25 mg, 6.25 mg, Oral, BID WITH MEALS, Edd Almaraz M.DRito, 6.25 mg at 07/01/19 0604  •  torsemide (DEMADEX) tablet 100 mg, 100 mg, Oral, Q DAY, Edd To M.DRito, 100 mg at 07/01/19 0606  •  losartan (COZAAR) tablet 25 mg, 25 mg, Oral, Q DAY, Edd To M.DRito, 25 mg at 07/01/19 0626  •  insulin regular (HUMULIN R) injection 2-9 Units, 2-9 Units, Subcutaneous, TID AC, 2 Units at 07/01/19 1258 **AND** Accu-Chek ACHS, , , Q AC AND BEDTIME(S) **AND** NOTIFY MD and PharmD, , , Once **AND** glucose 4 g chewable tablet 16 g, 16 g, Oral, Q15 MIN PRN **AND** DEXTROSE 10% BOLUS 250 mL, 250 mL, Intravenous, Q15 MIN PRN, Elly Zamora M.D.  •  cefepime (MAXIPIME) 2 g in  mL IVPB, 2 g, Intravenous, Q12HRS, Ryanne Martinez, A.P.R.N., Stopped at 07/01/19 0640  •  Respiratory Care per Protocol, , Nebulization, Continuous RT, Jose Monterroso, A.P.R.N.  •  linezolid (ZYVOX) tablet 600 mg, 600 mg, Oral, Q12HRS, Jose Monterroso, A.P.R.N., 600 mg at 07/01/19 0605  •  senna-docusate (PERICOLACE or SENOKOT S) 8.6-50 MG per tablet 2 Tab, 2 Tab, Oral, BID, 2 Tab at 06/30/19 1740 **AND** polyethylene glycol/lytes (MIRALAX) PACKET 1 Packet, 1 Packet, Oral, QDAY PRN **AND** magnesium hydroxide (MILK OF MAGNESIA) suspension 30 mL, 30 mL, Oral, QDAY PRN **AND** bisacodyl (DULCOLAX) suppository 10 mg, 10 mg, Rectal, QDAY PRN, Andi Winters M.D.  •  heparin injection 5,000 Units, 5,000 Units, Subcutaneous, Q8HRS, Andi Winters M.D., 5,000 Units at 07/01/19 1336  •  acetaminophen (TYLENOL) tablet 650 mg, 650 mg, Oral, Q6HRS PRN, Andi Winters M.D.  •  albuterol  (PROVENTIL) 2.5mg/0.5ml nebulizer solution 2.5 mg, 2.5 mg, Nebulization, Q4HRS PRN, Andi Winters M.D.  •  aspirin (ASA) chewable tab 81 mg, 81 mg, Oral, DAILY, Andi Winters M.D., 81 mg at 07/01/19 0604  •  atorvastatin (LIPITOR) tablet 20 mg, 20 mg, Oral, DAILY, Andi Winters M.D., 20 mg at 07/01/19 0605  •  famotidine (PEPCID) tablet 20 mg, 20 mg, Oral, DAILY, Andi Winters M.D., 20 mg at 07/01/19 0605

## 2019-07-01 NOTE — ASSESSMENT & PLAN NOTE
-Patient has very weak cough during the day however patient said this is his baseline.  -Noticed significant risk of aspiration evaluated by bedside nurse, me, and speech therapist.  -Per speech therapist recommend patient to have fees test however patient refused.  -Further discussion about the risk of aspiration patient would like to take the risk and eat for pleasure.  -Patient also wants to change CODE STATUS to DNR.  -Patient currently is competent to make medical decision.  We will respect patient wishes and start patient on safest diet currently as dysphagia II with nectar thick.     Continue modified diet to reduce relatively risk of aspiration

## 2019-07-01 NOTE — PROGRESS NOTES
2 RN skin check complete with RY Christianson  Devices in place Oxygen tubing with gray foam, compression stockings, heel float boots, peripheral IV.  Skin assessed under devices: yes, intact with the exception of non-blanchable redness to L ear.  Confirmed pressure ulcers found on: Non-blanchable redness to L ear.  New potential pressure ulcers; N/A; Wound consult placed: yes.  The following interventions in place waffle mattress, Q2Turn, Silicone nasal cannula, gray foam for oxygen, heel lift boots.    Generalized bruising and scabbing.  Abrasion left knee  Scab to right knee  Bottom red, blanching  Scab to left elbow and right elbow. Open scab to R elbow/tricep  Bilateral shins jose carlos with weeping  Surgical incisions to bilateral posterior legs with sutures intact; Surrounding skin intact.  Sutures to R lateral, dorsal foot with surrounding skin intact.  Sutures to R metatarsals on plantar aspect.  Sutures to L dorsal foot below 2nd metatarsal with surrounding skin intact.   Sutures to L metatarsals on plantar aspect.  R plantar foot wound. Wound bed color: Red. Wound edges asymmetrical  L plantar foot wound with purulent discharge. Wound bed color pink.

## 2019-07-01 NOTE — PROGRESS NOTES
2 RN skin check complete w/ RY Ferrell.   Devices in place oxygen tubing with gray foam, compression stockings, heel float boots (pt declined CAM boots at this time), foam dressings on bilateral elbows and left knee.  Skin assessed under devices ears pink/red, left ear red/blanching; left ear red/nonblanching.   Healing scabs on elbows and knees.  Confirmed pressure ulcers found on B/L plantar surface of feet, and 2nd and third left toes- unwitnessed due to surgical dressing  New potential pressure ulcers noted on: Right ear  Wound consult placed: Following    Barrier wipes, barrier cream, Q2 turns, waffle mattress.

## 2019-07-01 NOTE — DISCHARGE PLANNING
Anticipated Discharge Disposition: SNF     Action: Order for SNF received. Spoke with the patient at the bedside regarding SNF choices. The patient chose [facility] as his #1 choice and [facility] as his #2 choice. The patient signed both choice forms.  Choice forms faxed to [facility].    Barriers to Discharge: pending SNF acceptance.    Plan: SNF

## 2019-07-01 NOTE — PROGRESS NOTES
Infectious Disease Progress Note    Author: Kelly Guadarrama M.D. Date & Time of service: 2019  12:13 PM       Chief Complaint:  Bilateral diabetic foot wounds     Interval History:  79-year-old male admitted on 2019 due to worsening bilateral foot wounds and worsening CHF.      -AF, WBC 5.5, no issue with antibiotics, denies pain, denies being short of breath.   Afebrile, white count 7000.  Patient tolerating antibiotics, no new issues.      Review of Systems:  Review of Systems   Constitutional: Negative for chills and fever.   Respiratory: Negative for cough and shortness of breath.    Gastrointestinal: Negative for abdominal pain, constipation, diarrhea, nausea and vomiting.       Hemodynamics:  Temp (24hrs), Av.5 °C (97.7 °F), Min:36 °C (96.8 °F), Max:36.8 °C (98.3 °F)  Temperature: 36 °C (96.8 °F)  Pulse  Av.2  Min: 69  Max: 105   Blood Pressure : 111/62       Physical Exam:  Physical Exam   Constitutional: He appears well-developed and well-nourished.   HENT:   Head: Normocephalic and atraumatic.   Eyes: Pupils are equal, round, and reactive to light. Conjunctivae and EOM are normal.   Cardiovascular: Normal rate, regular rhythm and normal heart sounds.    Pulmonary/Chest: Effort normal and breath sounds normal.   Abdominal: Soft. Bowel sounds are normal. He exhibits no distension. There is no tenderness. There is no rebound.   Musculoskeletal: He exhibits edema and deformity.   Bilateral plantar ulcers, clean base, no surrounding erythema or warmth, minimal drainage, edema   Neurological: He is alert.   Skin: Skin is warm and dry.       Meds:    Current Facility-Administered Medications:   •  insulin glargine  •  carvedilol  •  torsemide  •  losartan  •  insulin regular **AND** Accu-Chek ACHS **AND** NOTIFY MD and PharmD **AND** glucose **AND** dextrose 10% bolus  •  cefepime  •  Respiratory Care per Protocol  •  linezolid  •  senna-docusate **AND** polyethylene glycol/lytes  **AND** magnesium hydroxide **AND** bisacodyl  •  heparin  •  acetaminophen  •  albuterol  •  aspirin  •  atorvastatin  •  famotidine    Labs:  Recent Labs      06/29/19   0355  06/30/19   0031  07/01/19   0019   WBC  7.0  5.2  6.0   RBC  3.51*  3.26*  3.18*   HEMOGLOBIN  10.6*  9.5*  9.3*   HEMATOCRIT  34.8*  32.3*  31.9*   MCV  99.1*  99.1*  100.3*   MCH  30.2  29.1  29.2   RDW  51.9*  52.5*  53.1*   PLATELETCT  204  189  176   MPV  10.4  10.8  10.6   NEUTSPOLYS  72.80*  63.50  68.70   LYMPHOCYTES  12.20*  19.00*  15.10*   MONOCYTES  12.20  15.60*  12.70   EOSINOPHILS  2.20  1.30  2.70   BASOPHILS  0.30  0.20  0.30   RBCMORPHOLO   --   Normal  Present     Recent Labs      06/29/19   0355  06/30/19   0031  07/01/19   0019   SODIUM  144  138  140   POTASSIUM  4.5  4.1  4.0   CHLORIDE  105  105  104   CO2  27  28  31   GLUCOSE  198*  244*  233*   BUN  32*  29*  32*     Recent Labs      06/29/19   0355  06/30/19   0031  07/01/19   0019   CREATININE  1.49*  1.45*  1.44*       Imaging:  Dx-chest-portable (1 View)    Result Date: 6/25/2019 6/25/2019 1:45 PM HISTORY/REASON FOR EXAM:  Shortness of Breath. TECHNIQUE/EXAM DESCRIPTION AND NUMBER OF VIEWS: Single portable view of the chest. COMPARISON: 9/11/2014 FINDINGS: The heart is not enlarged. Atherosclerotic calcification is seen. There are interstitial opacities most prominent at the lung bases. No pleural effusion or pneumothorax is identified.     Bilateral interstitial opacities may represent interstitial edema or pneumonitis. Mild cardiomegaly. Atherosclerotic plaque.     Dx-foot-complete 3+ Right    Result Date: 6/25/2019 6/25/2019 1:45 PM HISTORY/REASON FOR EXAM:  Atraumatic Pain/Swelling/Deformity; Rule out osteomyelitis distal metatarsals 4 and 5 TECHNIQUE/EXAM DESCRIPTION AND NUMBER OF VIEWS: 3 nonweightbearing views of the RIGHT foot. COMPARISON:  None FINDINGS:  No acute fracture is identified. There are claw toe deformities in the toes. There is a soft  tissue wound in the lateral forefoot at the level of the fifth MTP joint. There is lucency in the fifth toe, possibly representing osteomyelitis. There is severe forefoot edema.     1.  Lucency in the right fifth toe, possibly representing osteomyelitis. If clinically indicated, MRI could be performed for confirmation. 2.  Soft tissue edema within the forefoot.    Dx-foot-complete 3+ Left    Result Date: 2019 1:45 PM HISTORY/REASON FOR EXAM:  Atraumatic Pain/Swelling/Deformity; Rule out osteomyelitis, distal metatarsals Foot pain. Evaluate for osteomyelitis TECHNIQUE/EXAM DESCRIPTION AND NUMBER OF VIEWS: 3 nonweightbearing views of the LEFT foot. COMPARISON:  None FINDINGS:  No definite bony destruction is identified. There are claw toe deformities. There is subluxation of the first proximal phalanx at the MTP joint. There is forefoot edema.     1.  No definitive evidence for osteomyelitis. 2.  Forefoot deformities as described.    Dx-shoulder 2+ Right    Result Date: 2019 2:33 PM HISTORY/REASON FOR EXAM:  Pain/Deformity Following Trauma TECHNIQUE/EXAM DESCRIPTION AND NUMBER OF VIEWS:  3 views of the RIGHT shoulder. COMPARISON: None FINDINGS: No acute fracture or dislocation. There is severe degenerative change in the right acromioclavicular joint. Soft tissues are unremarkable.     1.  No acute findings. 2.  Severe degenerative change in the right acromioclavicular joint.    Us-julieta Single Level Bilat    Result Date: 2019   Vascular Laboratory  Conclusions  There is no evidence of arterial disease demonstrated on the right side.  Evidence of calcified arteries on the left side make assessment unreliable.  TATYANA AMIN  Age:    79    Gender:     M  MRN:    [id number]  :    1940      BSA:  Exam Date:     2019 11:23  Room #:     Inpatient  Priority:     Routine  Ht (in):             Wt (lb):  Ordering Physician:        ODILON CAROLINA  Referring Physician:       CAITY  ODILON  Sonographer:               Jackie Wiggins RVT  Study Type:                Complete Bilateral  Technical Quality:         Adequate  Indications:     Type 2 diabetes mellitus with foot ulcer  CPT Codes:       76442  ICD Codes:       E11.621  History:         Ulcerations on the bottom of both feet for 7 years. No prior                    exam.  Limitations:                 RIGHT  Waveform            Systolic BPs (mmHg)                             132           Brachial  Biphasic                                 Common Femoral  Triphasic                  156           Posterior Tibial  Biphasic                   155           Dorsalis Pedis                                           Peroneal                             1.18          RACHEL                                           TBI                       LEFT  Waveform        Systolic BPs (mmHg)                             125           Brachial  Biphasic                                 Common Femoral  Biphasic                   135           Posterior Tibial  Biphasic                   170           Dorsalis Pedis                                           Peroneal                             1.29          RACHEL                                           TBI  Findings  Right.  There is no evidence of arterial disease demonstrated on the right side.  Toe brachial index is normal; 0.83.  Doppler waveforms of the common femoral, popliteal, and dorsalis pedis  arteries are of high amplitude and biphasic.  Doppler waveform of the posterior tibial artery is triphasic.  Left.  Evidence of calcified arteries on the left side make assessment unreliable.  Toe brachial index shows evidence of calcified arteries; 1.18.  Doppler waveforms of the common femoral, popliteal, and posterior tibial  arteries are of high amplitude and biphasic.  Doppler waveform of the dorsalis pedis artery is triphasic.  Ryann Chaudhry M.D.  (Electronically Signed)  Final Date:      27 June 2019                    13:01    Us-extremity Venous Lower Bilat    Result Date: 2019   Vascular Laboratory  CONCLUSIONS  No evidence of deep venous thrombosis of the legs.  No prior study is available for comparison.  CHERISELE JIMENESNY  Exam Date:     2019 07:49  Room #:     Inpatient  Priority:     Routine  Ht (in):             Wt (lb):  Ordering Physician:        JORI AGUERO  Referring Physician:       [id number] Valentine  Sonographer:               Jackie Wiggins RVT  Study Type:                Complete Bilateral  Technical Quality:         Adequate  Age:    79    Gender:     M  MRN:    [id number]  :    1940      BSA:  Indications:     Localized swelling, mass and lump, lower limb, bilateral  CPT Codes:       55353  ICD Codes:       R22.43  History:         Swelling and edema of both legs with wounds of the feet. No                   prior duplex.  Limitations:  PROCEDURES:  Bilateral lower extremity venous duplex imaging.  The following venous structures were evaluated: common femoral, profunda  femoral, proximal portion of the greater saphenous, femoral, popliteal,  peroneal and posterior tibial veins.  Serial compression, augmentation maneuvers, color and spectral Doppler flow  evaluations were performed.  FINDINGS:  Bilateral lower extremities -  No evidence of deep venous thrombosis.  Complete color filling and compressibility with normal venous flow dynamics  including spontaneous flow, response to augmentation maneuvers, and  respiratory phasicity.  Ryann Chaudhry M.D.  (Electronically Signed)  Final Date:      2019                   11:00    Ec-echocardiogram Complete W/ Cont    Result Date: 2019  Transthoracic Echo Report Echocardiography Laboratory CONCLUSIONS Prior echo on 17, the function has declined. Left ventricular ejection fraction is visually estimated to be 35%.   Hypokinesis of the apex, inferolateral wall, and anterolateral wall.  No  thrombus. Normal inferior vena cava size without inspiratory collapse. Estimated right ventricular systolic pressure is 45 mmHg.  Mild to moderate mitral regurgitation. TATYANA AMIN Exam Date:         2019                    09:02 Exam Location:     Inpatient Priority:          Routine Ordering Physician:        SEBASTIAN SULLIVAN Referring Physician: Sonographer:               Clarissa Mendenhall RVT, [facility] Age:    79     Gender:    M MRN:    [id number] :    1940 BSA:    2.34   Ht (in):    72     Wt (lb):    250 Exam Type:     Complete, Contrast Indications:     Edema ICD Codes:       782.3 CPT Codes:       80371 BP:   122    /   66     HR:   80 Technical Quality:       Fair MEASUREMENTS  (Male / Female) Normal Values 2D ECHO LV Diastolic Diameter PLAX        5.5 cm                4.2 - 5.9 / 3.9 - 5.3 cm LV Systolic Diameter PLAX         4.3 cm                2.1 - 4.0 cm IVS Diastolic Thickness           0.97 cm               LVPW Diastolic Thickness          0.94 cm               LVOT Diameter                     2.2 cm                Estimated LV Ejection Fraction    35 %                  LV Ejection Fraction MOD 4C       32.1 %                IVC Diameter                      1.8 cm                DOPPLER AV Peak Velocity                  1.6 m/s               AV Peak Gradient                  10.5 mmHg             AV Mean Gradient                  6.3 mmHg              LVOT Peak Velocity                0.67 m/s              AV Area Cont Eq vti               1.7 cm²               Mitral E Point Velocity           1 m/s                 Mitral E to A Ratio               1.1                   Mitral A Duration                 129 ms                MV Pressure Half Time             41.1 ms               MV Area PHT                       5.3 cm²               MV Deceleration Time              142 ms                MR ERO PISA                       0.16 cm²              MR  Regurgitant Volume PISA        27.6 cm³              TR Peak Velocity                  304 cm/s              PV Peak Velocity                  0.86 m/s              PV Peak Gradient                  3 mmHg                RVOT Peak Velocity                0.75 m/s              * Indicates values subject to auto-interpretation LV EF:  35    % FINDINGS Left Ventricle The left ventricle was normal in size and thickness. Moderately reduced left ventricular systolic function. Hypokinesis of the apex, inferolateral wall, and anterolateral wall. Left ventricular ejection fraction is visually estimated to be 35%. Grade II diastolic dysfunction. 3 mL of contrast was used to evaluate for thrombus in the left ventricular apex. No apical thrombus seen. Existing IV was used at the left wrist. Right Ventricle Right ventricle not well visualized. Right Atrium The right atrium is normal in size.  Normal inferior vena cava size without inspiratory collapse. Left Atrium Mildly dilated left atrium. Left atrial volume index is 38 mL/sq m. Mitral Valve Mitral annular calcification. No mitral stenosis. Mild to moderate mitral regurgitation. ERO by PISA method is 0.16 sq cm and vena contracta is 0.5 cm. Aortic Valve Tricuspid aortic valve. Aortic sclerosis without stenosis. No aortic insufficiency.  The aortic valve is not well visualized. Tricuspid Valve Structurally normal tricuspid valve without significant stenosis. Mild tricuspid regurgitation. Estimated right ventricular systolic pressure is 45 mmHg. Pulmonic Valve The pulmonic valve is not well visualized. Pericardium No effusion. Aorta Normal aortic root for body surface area. Ryann Chaudhry M.D. (Electronically Signed) Final Date:     27 June 2019                 12:40      Micro:  Results     Procedure Component Value Units Date/Time    CULTURE TISSUE W/ GRM STAIN [[id number]] Collected:  06/28/19 1647    Order Status:  Completed Specimen:  Tissue Updated:  07/01/19 0855      "Significant Indicator NEG     Source TISS     Site 2nd and 3rd Left Metatarsals     Culture Result No growth at 72 hours.     Gram Stain Result No organisms seen.    Narrative:       Surgery Specimen    AFB Culture [[id number]] Collected:  06/28/19 1647    Order Status:  Completed Specimen:  Tissue Updated:  07/01/19 0855     Significant Indicator NEG     Source TISS     Site 2nd and 3rd Left Metatarsals     Culture Result Culture in progress.     AFB Smear Results No acid fast bacilli seen.    Narrative:       Surgery Specimen    Anaerobic Culture [[id number]] Collected:  06/28/19 1647    Order Status:  Completed Specimen:  Tissue Updated:  07/01/19 0855     Significant Indicator NEG     Source TISS     Site 2nd and 3rd Left Metatarsals     Culture Result Culture in progress.    Narrative:       Surgery Specimen    Fungal Culture [[id number]] Collected:  06/28/19 1647    Order Status:  Completed Specimen:  Tissue Updated:  07/01/19 0855     Significant Indicator NEG     Source TISS     Site 2nd and 3rd Left Metatarsals     Culture Result Culture in progress.    Narrative:       Surgery Specimen    BLOOD CULTURE [[id number]] Collected:  06/25/19 1237    Order Status:  Completed Specimen:  Blood from Peripheral Updated:  06/30/19 1500     Significant Indicator NEG     Source BLD     Site PERIPHERAL     Culture Result No growth after 5 days of incubation.    Narrative:       Per Hospital Policy: Only change Specimen Src: to \"Line\" if  specified by physician order.  No site indicated    Blood Culture [[id number]] Collected:  06/25/19 1342    Order Status:  Completed Specimen:  Blood from Peripheral Updated:  06/30/19 1500     Significant Indicator NEG     Source BLD     Site PERIPHERAL     Culture Result No growth after 5 days of incubation.    Narrative:       Per Hospital Policy: Only change Specimen Src: to \"Line\" if  specified by physician order.  Right Hand    AFB Culture [[id number]] Collected:  06/28/19 1658    Order " Status:  Completed Specimen:  Tissue Updated:  06/30/19 1228     Significant Indicator NEG     Source TISS     Site 5th Right Metatarsal     Culture Result Culture in progress.     AFB Smear Results No acid fast bacilli seen.    Narrative:       Surgery Specimen    Fungal Culture [[id number]] Collected:  06/28/19 1658    Order Status:  Completed Specimen:  Tissue Updated:  06/30/19 1228     Significant Indicator NEG     Source TISS     Site 5th Right Metatarsal     Culture Result Culture in progress.    Narrative:       Surgery Specimen    Anaerobic Culture [[id number]] Collected:  06/28/19 1658    Order Status:  Completed Specimen:  Tissue Updated:  06/30/19 1228     Significant Indicator NEG     Source TISS     Site 5th Right Metatarsal     Culture Result Culture in progress.    Narrative:       Surgery Specimen    CULTURE TISSUE W/ GRM STAIN [[id number]]  (Abnormal) Collected:  06/28/19 1658    Order Status:  Completed Specimen:  Tissue Updated:  06/30/19 1228     Significant Indicator POS (POS)     Source TISS     Site 5th Right Metatarsal     Culture Result Growth noted after further incubation, see below for  organism identification.   (A)     Gram Stain Result No organisms seen.     Culture Result Group D Enterococcus species  Rare growth  Combination therapy with ampicillin, penicillin, or  vancomycin (for susceptible strains) plus an aminoglycoside  is usually indicated for serious enterococcal infections,  such as endocarditis unless high-level resistance to both  gentamicin and streptomycin is documented; such combinations  are predicted to result in synergistic killing of the  Enterococcus.   (A)    Narrative:       Surgery Specimen    GRAM STAIN [[id number]] Collected:  06/28/19 1658    Order Status:  Completed Specimen:  Tissue Updated:  06/29/19 0125     Significant Indicator .     Source TISS     Site 5th Right Metatarsal     Gram Stain Result No organisms seen.    Narrative:       Surgery Specimen     Acid Fast Stain [[id number]] Collected:  06/28/19 1658    Order Status:  Completed Specimen:  Tissue Updated:  06/29/19 2315     Significant Indicator NEG     Source TISS     Site 5th Right Metatarsal     AFB Smear Results No acid fast bacilli seen.    Narrative:       Surgery Specimen    GRAM STAIN [[id number]] Collected:  06/28/19 1647    Order Status:  Completed Specimen:  Tissue Updated:  06/29/19 2315     Significant Indicator .     Source TISS     Site 2nd and 3rd Left Metatarsals     Gram Stain Result No organisms seen.    Narrative:       Surgery Specimen    Acid Fast Stain [[id number]] Collected:  06/28/19 1647    Order Status:  Completed Specimen:  Tissue Updated:  06/29/19 2315     Significant Indicator NEG     Source TISS     Site 2nd and 3rd Left Metatarsals     AFB Smear Results No acid fast bacilli seen.    Narrative:       Surgery Specimen    CULTURE WOUND W/ GRAM STAIN [[id number]] Collected:  06/25/19 0000    Order Status:  Canceled Specimen:  Other from Left Foot           Assessment:  Active Hospital Problems    Diagnosis   • Acute systolic CHF (congestive heart failure) ([facility]) [I50.21]   • Diabetic foot infection ([facility]) [E11.628, L08.9]   • DNR (do not resuscitate) [Z66]   • Cardiomyopathy ([facility]) [I42.9]   • Elevated troponin [R74.8]   • Macrocytosis [D75.89]   • Venous stasis dermatitis of both lower extremities [I87.2]   • CKD (chronic kidney disease), stage III ([facility]) [N18.3]   • CAD (coronary artery disease) [I25.10]   • Edema [R60.9]   • COPD (chronic obstructive pulmonary disease) ([facility]) [J44.9]   • Mixed hyperlipidemia [E78.2]   • Essential hypertension, benign [I10]   • Type 2 diabetes mellitus with kidney complication, without long-term current use of insulin ([facility]) [E11.29]     Interval 24 hour assessment:    AF, O2 2.5 L NC,    Labs reviewed  Studies reviewed  Micro reviewed and discussed with micro lab.     Pt denies and pain in feet.     Plan:  Bilateral diabetic foot ulcers with underlying  osteomyelitis  Afebrile  No leukocytosis  Blood cultures on 6/25 NGTD  On 6/28, performed bilateral GSR, right fifth MTH excision and left second MTH excision, I&D- OR tissue cx with Group D enterococcus   Being followed by LPS  Stop Zyvox and cefepime and transition to unasyn   Anticipate 6 weeks of IV antibiotics, will likely need placement for IV antibiotics   Aerobic cultures final, anaerobic still pending      Uncontrolled type 2 diabetes  Hemoglobin A1c 10.3% on 6/26/2019  Will adversely affect wound healing and resolution of infection     CKD-III  CrCl~ 50-55  Renally adjust antibiotics as needed  Continue to monitor closely     Patient was discussed with Dr. Zamora     ID will follow.  Please call with questions.

## 2019-07-02 ENCOUNTER — APPOINTMENT (OUTPATIENT)
Dept: RADIOLOGY | Facility: MEDICAL CENTER | Age: 79
DRG: 622 | End: 2019-07-02
Attending: INTERNAL MEDICINE
Payer: MEDICARE

## 2019-07-02 ENCOUNTER — APPOINTMENT (OUTPATIENT)
Dept: RADIOLOGY | Facility: MEDICAL CENTER | Age: 79
DRG: 622 | End: 2019-07-02
Attending: NURSE PRACTITIONER
Payer: MEDICARE

## 2019-07-02 LAB
GLUCOSE BLD-MCNC: 151 MG/DL (ref 65–99)
GLUCOSE BLD-MCNC: 174 MG/DL (ref 65–99)
GLUCOSE BLD-MCNC: 183 MG/DL (ref 65–99)

## 2019-07-02 PROCEDURE — 82962 GLUCOSE BLOOD TEST: CPT

## 2019-07-02 PROCEDURE — 770006 HCHG ROOM/CARE - MED/SURG/GYN SEMI*

## 2019-07-02 PROCEDURE — 700105 HCHG RX REV CODE 258: Performed by: INTERNAL MEDICINE

## 2019-07-02 PROCEDURE — 700111 HCHG RX REV CODE 636 W/ 250 OVERRIDE (IP): Performed by: INTERNAL MEDICINE

## 2019-07-02 PROCEDURE — A9270 NON-COVERED ITEM OR SERVICE: HCPCS | Performed by: INTERNAL MEDICINE

## 2019-07-02 PROCEDURE — 05HY33Z INSERTION OF INFUSION DEVICE INTO UPPER VEIN, PERCUTANEOUS APPROACH: ICD-10-PCS | Performed by: NURSE PRACTITIONER

## 2019-07-02 PROCEDURE — 700102 HCHG RX REV CODE 250 W/ 637 OVERRIDE(OP): Performed by: INTERNAL MEDICINE

## 2019-07-02 PROCEDURE — 36573 INSJ PICC RS&I 5 YR+: CPT

## 2019-07-02 PROCEDURE — 99232 SBSQ HOSP IP/OBS MODERATE 35: CPT | Performed by: INTERNAL MEDICINE

## 2019-07-02 RX ADMIN — CARVEDILOL 6.25 MG: 6.25 TABLET, FILM COATED ORAL at 07:30

## 2019-07-02 RX ADMIN — AMPICILLIN SODIUM AND SULBACTAM SODIUM 3 G: 2; 1 INJECTION, POWDER, FOR SOLUTION INTRAMUSCULAR; INTRAVENOUS at 12:35

## 2019-07-02 RX ADMIN — HEPARIN SODIUM 5000 UNITS: 5000 INJECTION, SOLUTION INTRAVENOUS; SUBCUTANEOUS at 06:12

## 2019-07-02 RX ADMIN — FAMOTIDINE 20 MG: 20 TABLET ORAL at 06:13

## 2019-07-02 RX ADMIN — LOSARTAN POTASSIUM 25 MG: 25 TABLET ORAL at 06:12

## 2019-07-02 RX ADMIN — INSULIN HUMAN 2 UNITS: 100 INJECTION, SOLUTION PARENTERAL at 12:37

## 2019-07-02 RX ADMIN — AMPICILLIN SODIUM AND SULBACTAM SODIUM 3 G: 2; 1 INJECTION, POWDER, FOR SOLUTION INTRAMUSCULAR; INTRAVENOUS at 23:46

## 2019-07-02 RX ADMIN — ATORVASTATIN CALCIUM 20 MG: 20 TABLET, FILM COATED ORAL at 06:13

## 2019-07-02 RX ADMIN — TORSEMIDE 100 MG: 100 TABLET ORAL at 06:13

## 2019-07-02 RX ADMIN — SENNOSIDES, DOCUSATE SODIUM 2 TABLET: 50; 8.6 TABLET, FILM COATED ORAL at 17:46

## 2019-07-02 RX ADMIN — AMPICILLIN SODIUM AND SULBACTAM SODIUM 3 G: 2; 1 INJECTION, POWDER, FOR SOLUTION INTRAMUSCULAR; INTRAVENOUS at 17:46

## 2019-07-02 RX ADMIN — CARVEDILOL 6.25 MG: 6.25 TABLET, FILM COATED ORAL at 17:46

## 2019-07-02 RX ADMIN — HEPARIN SODIUM 5000 UNITS: 5000 INJECTION, SOLUTION INTRAVENOUS; SUBCUTANEOUS at 14:17

## 2019-07-02 RX ADMIN — AMPICILLIN SODIUM AND SULBACTAM SODIUM 3 G: 2; 1 INJECTION, POWDER, FOR SOLUTION INTRAMUSCULAR; INTRAVENOUS at 06:12

## 2019-07-02 RX ADMIN — INSULIN GLARGINE 10 UNITS: 100 INJECTION, SOLUTION SUBCUTANEOUS at 17:46

## 2019-07-02 RX ADMIN — HEPARIN SODIUM 5000 UNITS: 5000 INJECTION, SOLUTION INTRAVENOUS; SUBCUTANEOUS at 23:46

## 2019-07-02 RX ADMIN — INSULIN HUMAN 2 UNITS: 100 INJECTION, SOLUTION PARENTERAL at 17:46

## 2019-07-02 RX ADMIN — ASPIRIN 81 MG 81 MG: 81 TABLET ORAL at 06:13

## 2019-07-02 RX ADMIN — AMPICILLIN SODIUM AND SULBACTAM SODIUM 3 G: 2; 1 INJECTION, POWDER, FOR SOLUTION INTRAMUSCULAR; INTRAVENOUS at 01:26

## 2019-07-02 ASSESSMENT — LIFESTYLE VARIABLES: SUBSTANCE_ABUSE: 0

## 2019-07-02 ASSESSMENT — ENCOUNTER SYMPTOMS
PHOTOPHOBIA: 0
COUGH: 1
VOMITING: 0
NAUSEA: 0
NERVOUS/ANXIOUS: 0
DIZZINESS: 0
BLURRED VISION: 0
SHORTNESS OF BREATH: 0
WEAKNESS: 0
CHILLS: 0
EYE DISCHARGE: 0
HEADACHES: 0
FEVER: 0
SORE THROAT: 0
DEPRESSION: 0
HEARTBURN: 0
DIARRHEA: 0
MYALGIAS: 0
FALLS: 0
TINGLING: 0
NECK PAIN: 0
CONSTIPATION: 0
ABDOMINAL PAIN: 0
PALPITATIONS: 0
SENSORY CHANGE: 0

## 2019-07-02 ASSESSMENT — PATIENT HEALTH QUESTIONNAIRE - PHQ9
SUM OF ALL RESPONSES TO PHQ9 QUESTIONS 1 AND 2: 0
1. LITTLE INTEREST OR PLEASURE IN DOING THINGS: NOT AT ALL

## 2019-07-02 NOTE — PROGRESS NOTES
"Patient requesting boots to come off. Patient educated importance of the boots in his recovery. Patient continues to refuse boots stating, \"I don't care they hurt.\" Patient agreed to reassess putting boots back on in am after the patient wakes up. Boots removed. Will continue to educate patient on the importance of wearing the boots.   "

## 2019-07-02 NOTE — PROGRESS NOTES
Monitor summary: SR 65-81 with PVCs. .18/.12/.38. 1.4 second pause; Dr. Zamora notified.  medical at 1335.

## 2019-07-02 NOTE — PROGRESS NOTES
[facility] Medicine Daily Progress Note    Date of Service  7/2/2019    Chief Complaint  79 y.o. male admitted 6/25/2019 with BLE swelling and diabetic foot infection.    Hospital Course    H/O COPD, CAD, HTN, DM, HLD.  Patient follows with outpatient wound care for chronic, nonhealing bilateral diabetic foot wounds.  Presented to ED after developing worsening erythema and swelling of BLE as well as purulent drainage from foot wounds.  Started on antibiotics.  ID and LPS consulted.  Also found to have mildly elevated troponin with stable EKG.  Suspect volume overload with elevated BNP and edema noted on chest x-ray.  Continue diuresis.  Echo revealing new CHF with EF 35%.  Cardiology consulted.  Pending surgical I&D.  Will need cardiac clearance prior to surgery.          Interval Problem Update  7/1.  Patient tolerated current diet but still showing signs of frequent cough.  Unclear if this is chronic cough although aspiration however patient definitely carried high risk of aspiration.  Previously long discussion regarding choice of oral intake and patient choose to eat with pleasure knowing the risk.  Patient's CODE STATUS already changed to DNR.  Patient still complains of lower extremity edema and tenderness and pain. Patient's pain is local 3-4/10, intermittent and does not radiate to other location, sharp and with some tingling. Can be controlled by pain meds. Dressing in place.  7/2: Pt seen and examined, resting in bed, complains of lower extremity pain, afebrile, family at bedside questions answered. Cont on IV abx as per ID rec.  shaun also order a PICC line as pt will need IV abx for a long period.   Consultants/Specialty  ID  LPS  Cardiology    Code Status  DNR    Disposition  SNF    Review of Systems  Review of Systems   Constitutional: Negative for chills and fever.   HENT: Negative for ear pain and hearing loss.    Eyes: Negative for blurred vision, photophobia and discharge.   Respiratory: Positive for  cough. Negative for shortness of breath.    Cardiovascular: Positive for leg swelling. Negative for chest pain and palpitations.   Gastrointestinal: Negative for abdominal pain, diarrhea, heartburn and vomiting.   Genitourinary: Negative for dysuria, frequency and hematuria.   Musculoskeletal: Negative for falls, myalgias and neck pain.   Skin: Negative for itching and rash.        BLE swelling and erythema.  Bilateral foot wounds.    Neurological: Negative for dizziness, tingling, weakness and headaches.   Psychiatric/Behavioral: Negative for depression and substance abuse.        Physical Exam  Temp:  [36.2 °C (97.2 °F)-36.6 °C (97.8 °F)] 36.4 °C (97.5 °F)  Pulse:  [75-89] 75  Resp:  [16-17] 16  BP: ()/(53-76) 99/61  SpO2:  [95 %-100 %] 97 %    Physical Exam   Constitutional: He is oriented to person, place, and time. He appears well-developed.   Obese male.    HENT:   Head: Normocephalic and atraumatic.   Eyes: Pupils are equal, round, and reactive to light. Conjunctivae and EOM are normal. Right eye exhibits no discharge. Left eye exhibits no discharge.   Neck: Normal range of motion. Neck supple. No JVD present. No thyromegaly present.   Cardiovascular: Normal rate, regular rhythm and normal heart sounds.  Exam reveals no friction rub.    BLE edema   Pulmonary/Chest: Effort normal. No respiratory distress. He has decreased breath sounds in the right lower field and the left lower field. He has wheezes in the right lower field, the left upper field, the left middle field and the left lower field. He exhibits no tenderness.   Patient's cough is weak and patient showing signs of aspiration   Abdominal: Soft. Bowel sounds are normal. He exhibits no distension. There is no tenderness.   Musculoskeletal: Normal range of motion. He exhibits edema and tenderness. He exhibits no deformity.   Neurological: He is alert and oriented to person, place, and time. He displays normal reflexes.   Skin: Skin is warm and  dry. No rash noted. There is erythema.   Stasis dermatitis noted to BLE   Bilateral diabetic foot wounds with purulent drainage.  Dressing intact.    Psychiatric: He has a normal mood and affect.   Nursing note and vitals reviewed.      Fluids    Intake/Output Summary (Last 24 hours) at 07/02/19 1251  Last data filed at 07/02/19 0700   Gross per 24 hour   Intake              240 ml   Output             1650 ml   Net            -1410 ml       Laboratory  Recent Labs      06/30/19   0031  07/01/19   0019   WBC  5.2  6.0   RBC  3.26*  3.18*   HEMOGLOBIN  9.5*  9.3*   HEMATOCRIT  32.3*  31.9*   MCV  99.1*  100.3*   MCH  29.1  29.2   MCHC  29.4*  29.2*   RDW  52.5*  53.1*   PLATELETCT  189  176   MPV  10.8  10.6     Recent Labs      06/30/19   0031  07/01/19   0019   SODIUM  138  140   POTASSIUM  4.1  4.0   CHLORIDE  105  104   CO2  28  31   GLUCOSE  244*  233*   BUN  29*  32*   CREATININE  1.45*  1.44*   CALCIUM  8.6  8.7                   Imaging  US-RACHEL SINGLE LEVEL BILAT   Final Result      EC-ECHOCARDIOGRAM COMPLETE W/ CONT   Final Result      US-EXTREMITY VENOUS LOWER BILAT   Final Result      DX-SHOULDER 2+ RIGHT   Final Result      1.  No acute findings.      2.  Severe degenerative change in the right acromioclavicular joint.      DX-FOOT-COMPLETE 3+ RIGHT   Final Result      1.  Lucency in the right fifth toe, possibly representing osteomyelitis. If clinically indicated, MRI could be performed for confirmation.      2.  Soft tissue edema within the forefoot.      DX-FOOT-COMPLETE 3+ LEFT   Final Result      1.  No definitive evidence for osteomyelitis.      2.  Forefoot deformities as described.      DX-CHEST-PORTABLE (1 VIEW)   Final Result      Bilateral interstitial opacities may represent interstitial edema or pneumonitis.      Mild cardiomegaly.      Atherosclerotic plaque.         IR-PICC LINE PLACEMENT W/ GUIDANCE > AGE 5    (Results Pending)        Assessment/Plan    Acute systolic CHF (congestive heart  failure) ([facility])   Assessment & Plan    Echo reveals EF of 35%  New diagnosis  Known CAD with multivessel disease  Mild volume overload undergoing diuresis  Seen by cardiology  Continue coreg   Continue diuretics, changed to torsemide by cardiologist.       Diabetic foot infection (HCC)   Assessment & Plan    History of chronic diabetic wounds to bilateral feet.  Has been following with outpatient wound clinic  Recent worsening of wounds with purulent drainage.   He found to have possible right foot osteomyelitis on x-ray.  S/p I&D by ortho   ID following.  Now on Unasyn as per ID rec, cefepime and zyvox d/c   LPS following.       Oropharyngeal dysphagia- (present on admission)   Assessment & Plan    -Patient has very weak cough during the day however patient said this is his baseline.  -Noticed significant risk of aspiration evaluated by bedside nurse, me, and speech therapist.  -Per speech therapist recommend patient to have fees test however patient refused.  -Further discussion about the risk of aspiration patient would like to take the risk and eat for pleasure.  -Patient also wants to change CODE STATUS to DNR.  -Patient currently is competent to make medical decision.  We will respect patient wishes and start patient on safest diet currently as dysphagia II with nectar thick.     Continue modified diet to reduce relatively risk of aspiration     DNR (do not resuscitate)   Assessment & Plan    Patient is competent to make medical decision.  CODE STATUS remain on DNR     Venous stasis dermatitis of both lower extremities   Assessment & Plan    Secondary to chronic edema  Continue diuretics, changed to torsemide by cardiologist.  Skin care.      Macrocytosis   Assessment & Plan    TSH, vitamin B12 and folic acid wnl.     Elevated troponin   Assessment & Plan    He found to have mildly elevated troponin.  He denies any symptoms of chest pain.  EKG stable  ? Stress induced secondary to volume overload  Echo shows EF  35%.  Continue diuretics, changed to torsemide by cardiologist.  Appreciate rec.        CKD (chronic kidney disease), stage III ([facility])- (present on admission)   Assessment & Plan    History of chronic kidney disease.  Currently renal functions are at the baseline.  Avoid nephrotoxins.  Continue to monitor  Kidney function remained stable.     CAD (coronary artery disease)- (present on admission)   Assessment & Plan    Hx of  Continue lipitor and ASA.      Mixed hyperlipidemia- (present on admission)   Assessment & Plan    Continue lipitor.      Edema- (present on admission)   Assessment & Plan    Hx of chronic BLE edema.  Worse prior to admit.  Secondary to CHF.  BNP elevated.   LE US negative for DVT  RACHEL negative for arterial disease.   Continue diuretics, changed to torsemide by cardiologist.       COPD (chronic obstructive pulmonary disease) ([facility])- (present on admission)   Assessment & Plan    Wheezing noted on exam  Denies shortness of breath  Pro curt negative  Continue RT protocol, BT, and supplemental o2.      Type 2 diabetes mellitus with kidney complication, without long-term current use of insulin ([facility])- (present on admission)   Assessment & Plan    Uncontrolled  A1c 10.3  Hold metformin  Continue SSI while in the hospital   Diabetic diet and accuchecks.      Essential hypertension, benign- (present on admission)   Assessment & Plan    Blood pressure stable  Continue home medication               VTE prophylaxis: Heparin

## 2019-07-02 NOTE — DISCHARGE PLANNING
Agency/Facility Name: [facility]  Spoke To: Fax Transmission  Outcome: Patient declined due to being too medically complex. Facility stated when patient is more stable, they will be open to reviewing an updated referral.     Agency[facility]Facility Name: [facility]   Spoke To: Amy  Outcome: Referral not received, facility requested referral be faxed to [phone]. Referral manually faxed.

## 2019-07-02 NOTE — PROGRESS NOTES
Infectious Disease Progress Note    Author: SALUD Petty Date & Time of service: 2019  2:54 PM       Chief Complaint:  Bilateral diabetic foot wounds     Interval History:  79-year-old male admitted on 2019 due to worsening bilateral foot wounds and worsening CHF.      -AF, WBC 5.5, no issue with antibiotics, denies pain, denies being short of breath.   Afebrile, white count 7000.  Patient tolerating antibiotics, no new issues.  -AF, no WBC, no issue with antibiotics, denies pain stating he feels just fine, complaining that he has not been able to get up and walk around.    Labs, medications and wounds reviewed.      Review of Systems:  Review of Systems   Constitutional: Negative for chills, fever and malaise/fatigue.   HENT: Negative for sore throat.    Respiratory: Negative for shortness of breath.    Cardiovascular: Negative for chest pain.   Gastrointestinal: Negative for abdominal pain, constipation, diarrhea, nausea and vomiting.   Genitourinary: Negative for dysuria.   Musculoskeletal: Negative for joint pain and myalgias.   Skin: Negative for rash.   Neurological: Negative for sensory change and headaches.   Psychiatric/Behavioral: The patient is not nervous/anxious.        Hemodynamics:  Temp (24hrs), Av.4 °C (97.5 °F), Min:36.2 °C (97.2 °F), Max:36.6 °C (97.8 °F)  Temperature: 36.4 °C (97.5 °F)  Pulse  Av.6  Min: 69  Max: 105   Blood Pressure : (!) 99/61 (Nurse notified.)       Physical Exam:  Physical Exam   Constitutional: He is oriented to person, place, and time. He appears well-developed and well-nourished. No distress.   Elderly  Disheveled  Obese   HENT:   Mouth/Throat: Oropharynx is clear and moist. No oropharyngeal exudate.   Eyes: Pupils are equal, round, and reactive to light. Conjunctivae and EOM are normal. No scleral icterus.   Neck: Normal range of motion. Neck supple. No tracheal deviation present.   Cardiovascular: Normal rate, regular rhythm  and normal heart sounds.    No murmur heard.  Faint distal pulses to BLE   Pulmonary/Chest: Effort normal. No respiratory distress. He has no wheezes.   2.5 L nasal cannula  Decreased breath sounds to BLL   Abdominal: Soft. Bowel sounds are normal. He exhibits no distension. There is no tenderness. There is no rebound and no guarding.   Genitourinary:   Genitourinary Comments: Condom cath in place with clear yellow urine   Musculoskeletal: He exhibits edema (Trace BLE) and deformity. He exhibits no tenderness.   Bilateral Achilles-sutures in place, no active drainage, no erythema.  Left second/third and right fifth MTH incisions-sutures in place, no active drainage, trace erythema along incisions.  Tenotomy sites with sutures in place, no active drainage, no erythema.  Bilateral plantar ulcerations-Hydrofera Blue dressing in place, trace erythema to surrounding tissue, no minimal serosanguineous drainage without odor.   Neurological: He is alert and oriented to person, place, and time. No cranial nerve deficit.   Skin: Skin is warm and dry. No rash noted. He is not diaphoretic. There is erythema.   Psychiatric: He has a normal mood and affect. Thought content normal.   Nursing note and vitals reviewed.      Meds:    Current Facility-Administered Medications:   •  ampicillin-sulbactam (UNASYN) IV  •  insulin glargine  •  carvedilol  •  torsemide  •  losartan  •  insulin regular **AND** Accu-Chek ACHS **AND** NOTIFY MD and PharmD **AND** glucose **AND** dextrose 10% bolus  •  Respiratory Care per Protocol  •  senna-docusate **AND** polyethylene glycol/lytes **AND** magnesium hydroxide **AND** bisacodyl  •  heparin  •  acetaminophen  •  albuterol  •  aspirin  •  atorvastatin  •  famotidine    Labs:  Recent Labs      06/30/19   0031  07/01/19   0019   WBC  5.2  6.0   RBC  3.26*  3.18*   HEMOGLOBIN  9.5*  9.3*   HEMATOCRIT  32.3*  31.9*   MCV  99.1*  100.3*   MCH  29.1  29.2   RDW  52.5*  53.1*   PLATELETCT  189  176    MPV  10.8  10.6   NEUTSPOLYS  63.50  68.70   LYMPHOCYTES  19.00*  15.10*   MONOCYTES  15.60*  12.70   EOSINOPHILS  1.30  2.70   BASOPHILS  0.20  0.30   RBCMORPHOLO  Normal  Present     Recent Labs      06/30/19   0031  07/01/19   0019   SODIUM  138  140   POTASSIUM  4.1  4.0   CHLORIDE  105  104   CO2  28  31   GLUCOSE  244*  233*   BUN  29*  32*     Recent Labs      06/30/19   0031  07/01/19   0019   CREATININE  1.45*  1.44*       Imaging:  No new imaging within the last 24 hours.    Micro:  Results     Procedure Component Value Units Date/Time    AFB Culture [[id number]] Collected:  06/28/19 1658    Order Status:  Completed Specimen:  Tissue Updated:  07/02/19 1004     Significant Indicator NEG     Source TISS     Site 5th Right Metatarsal     Culture Result Culture in progress.     AFB Smear Results No acid fast bacilli seen.    Narrative:       Surgery Specimen    Fungal Culture [[id number]] Collected:  06/28/19 1658    Order Status:  Completed Specimen:  Tissue Updated:  07/02/19 1004     Significant Indicator NEG     Source TISS     Site 5th Right Metatarsal     Culture Result No fungal growth to date.    Narrative:       Surgery Specimen    Anaerobic Culture [[id number]] Collected:  06/28/19 1658    Order Status:  Completed Specimen:  Tissue Updated:  07/02/19 1004     Significant Indicator NEG     Source TISS     Site 5th Right Metatarsal     Culture Result No Anaerobes isolated.    Narrative:       Surgery Specimen    CULTURE TISSUE W/ GRM STAIN [[id number]] Collected:  06/28/19 1647    Order Status:  Completed Specimen:  Tissue Updated:  07/02/19 1004     Significant Indicator NEG     Source TISS     Site 2nd and 3rd Left Metatarsals     Culture Result No growth at 72 hours.     Gram Stain Result No organisms seen.    Narrative:       Surgery Specimen    AFB Culture [[id number]] Collected:  06/28/19 1647    Order Status:  Completed Specimen:  Tissue Updated:  07/02/19 1004     Significant Indicator NEG      Source TISS     Site 2nd and 3rd Left Metatarsals     Culture Result Culture in progress.     AFB Smear Results No acid fast bacilli seen.    Narrative:       Surgery Specimen    Anaerobic Culture [[id number]] Collected:  06/28/19 1647    Order Status:  Completed Specimen:  Tissue Updated:  07/02/19 1004     Significant Indicator NEG     Source TISS     Site 2nd and 3rd Left Metatarsals     Culture Result Culture in progress.    Narrative:       Surgery Specimen    Fungal Culture [[id number]] Collected:  06/28/19 1647    Order Status:  Completed Specimen:  Tissue Updated:  07/02/19 1004     Significant Indicator NEG     Source TISS     Site 2nd and 3rd Left Metatarsals     Culture Result No fungal growth to date.    Narrative:       Surgery Specimen    CULTURE TISSUE W/ GRM STAIN [[id number]]  (Abnormal)  (Susceptibility) Collected:  06/28/19 1658    Order Status:  Completed Specimen:  Tissue Updated:  07/02/19 1004     Significant Indicator POS (POS)     Source TISS     Site 5th Right Metatarsal     Culture Result Growth noted after further incubation, see below for  organism identification.   (A)     Gram Stain Result No organisms seen.     Culture Result Enterococcus faecalis  Rare growth  Combination therapy with ampicillin, penicillin, or  vancomycin (for susceptible strains) plus an aminoglycoside  is usually indicated for serious enterococcal infections,  such as endocarditis unless high-level resistance to both  gentamicin and streptomycin is documented; such combinations  are predicted to result in synergistic killing of the  Enterococcus.   (A)    Narrative:       Surgery Specimen    Culture & Susceptibility     ENTEROCOCCUS FAECALIS     Antibiotic Sensitivity Microscan Unit Status    Ampicillin Sensitive <=2 mcg/mL Final    Method: PETTY    Daptomycin Sensitive 1 mcg/mL Final    Method: PETTY    Gent Synergy Sensitive <=500 mcg/mL Final    Method: PETTY    Penicillin Sensitive 2 mcg/mL Final    Method: PTETY     "Vancomycin Sensitive 2 mcg/mL Final    Method: PETTY                       BLOOD CULTURE [[id number]] Collected:  06/25/19 1237    Order Status:  Completed Specimen:  Blood from Peripheral Updated:  06/30/19 1500     Significant Indicator NEG     Source BLD     Site PERIPHERAL     Culture Result No growth after 5 days of incubation.    Narrative:       Per Hospital Policy: Only change Specimen Src: to \"Line\" if  specified by physician order.  No site indicated    Blood Culture [[id number]] Collected:  06/25/19 1342    Order Status:  Completed Specimen:  Blood from Peripheral Updated:  06/30/19 1500     Significant Indicator NEG     Source BLD     Site PERIPHERAL     Culture Result No growth after 5 days of incubation.    Narrative:       Per Hospital Policy: Only change Specimen Src: to \"Line\" if  specified by physician order.  Right Hand    GRAM STAIN [[id number]] Collected:  06/28/19 1658    Order Status:  Completed Specimen:  Tissue Updated:  06/29/19 2315     Significant Indicator .     Source TISS     Site 5th Right Metatarsal     Gram Stain Result No organisms seen.    Narrative:       Surgery Specimen    Acid Fast Stain [[id number]] Collected:  06/28/19 1658    Order Status:  Completed Specimen:  Tissue Updated:  06/29/19 2315     Significant Indicator NEG     Source TISS     Site 5th Right Metatarsal     AFB Smear Results No acid fast bacilli seen.    Narrative:       Surgery Specimen    GRAM STAIN [[id number]] Collected:  06/28/19 1647    Order Status:  Completed Specimen:  Tissue Updated:  06/29/19 2315     Significant Indicator .     Source TISS     Site 2nd and 3rd Left Metatarsals     Gram Stain Result No organisms seen.    Narrative:       Surgery Specimen    Acid Fast Stain [[id number]] Collected:  06/28/19 1647    Order Status:  Completed Specimen:  Tissue Updated:  06/29/19 2315     Significant Indicator NEG     Source TISS     Site 2nd and 3rd Left Metatarsals     AFB Smear Results No acid fast bacilli " seen.    Narrative:       Surgery Specimen          Assessment:  Active Hospital Problems    Diagnosis   • Diabetic foot infection (HCC) [E11.628, L08.9]   • Venous stasis dermatitis of both lower extremities [I87.2]   • CKD (chronic kidney disease), stage III (HCC) [N18.3]   • Edema [R60.9]   • Type 2 diabetes mellitus with kidney complication, without long-term current use of insulin (HCC) [E11.29]     Interval 24 hour assessment:    AF, O2 2.5 L NC,      Plan:  Bilateral diabetic foot ulcers with underlying osteomyelitis  Afebrile  No leukocytosis on last check  Blood cultures on 6/25 negative  Underwent bilateral tendon Achilles lengthening, bilateral second third fourth and fifth tenotomies, right fifth metatarsal head excision, left second and third metatarsal head excision on 6/28 with Dr. Cooper.  OR culture of right fifth metatarsal +E faecalis  Being followed by LPS  Continue IV Unasyn 3 g every 6 hours  Plan for 6 weeks IV antibiotics due to positive OR bone culture  Estimated end date 8/9/2019  Recommend SNF placement-patient lives in [place]     Uncontrolled type 2 diabetes  Hemoglobin A1c 10.3% on 6/26/2019  Will adversely affect wound healing and resolution of infection   this a.m.     CKD-III  CrCl~ 50-55  Renally adjust antibiotics as needed  Continue to monitor closely

## 2019-07-02 NOTE — PROCEDURES
Vascular Access Team     Date of Insertion: 7/2/19  Arm Circumference: 30  Internal length: 44  External Length: hubbed  Vein Occupancy %: 33  Reason for PICC: IV abx  Labs: WBC 6.0, , BUN 32, Cr 1.44, GFR 48, INR n/a    Consents confirmed, vessel patency confirmed with ultrasound. Risks and benefits of procedure explained to patient and education regarding central line associated bloodstream infections provided. Questions answered.     PICC placed in RUE per MD order with ultrasound guidance, initial arm circumference 30cm. 4 Fr, 1 lumen PICC placed in basilic vein after 1 attempt(s). 2 cc's of 1% lidocaine injected intradermally, 21 gauge microintroducer needle and modified Seldinger technique used. 44 cm catheter inserted with good blood return. Secured at hub marker. Each lumen flushed without resistance with 10 mL 0.9% normal saline. PICC line secured with Biopatch and Tegaderm.     PICC placement is confirmed by nurse using 3CG technology. PICC line is appropriate for use at this time. Patient tolerated procedure well, without complications.  Patient condition relayed to unit RN or ordering physician via this post procedure note in the EMR.     Ultrasound images uploaded to PACS and viewable in the EMR - yes  Ultrasound imaged printed and placed in paper chart - no     [employer] Power PICC ref # [id number], Lot # [id number]

## 2019-07-02 NOTE — PROGRESS NOTES
LIMB PRESERVATION SERVICE      HPI:  79 y.o. male, with a past medical history that includes type 2 diabetes, falls, COPD, wears O2 at night, venous stasis admitted 6/25/2019 for Bilateral lower extremity edema.     LPS has been consulted for L 2nd MTH ulcer and R 5th MTH ulcer.    Pt was recently discharged from [facility] wound clinic about 2-3 wks ago once ulcers resolved. He was fit with diabetic shoes but has yet to wear them. He was also measured for compression stockings but has not received them yet.   He is unaware that the ulcers to feet have reopened    SURGERY DATE: 6/20/2019 by Dr. Cooper    PROCEDURE:    1.  Bilateral tendo-Achilles lengthening.  2.  Bilateral second, third, fourth and fifth percutaneous flexor tenotomies.  3.  Right fifth metatarsal head excision.  4.  Left second metatarsal head excision.  5.  Left third metatarsal head excision.  6.  Irrigation and debridement of skin, subcutaneous tissue to fascia, left   lower extremity.  7.  Irrigation and debridement of skin, subcutaneous tissue to bone, right   lower extremity.        7/1: Patient refusing to wear offloading boots or PRAFO boots.  States they are too heavy.  Wearing a float boots while in bed.  Feet and plantar flexion.  Patient denies fevers, chills, nausea, vomiting.  Pain well controlled.   7/2: wearing PRAFOs while in bed. Denies pain to BLE      BP (!) 99/61 Comment: Nurse notified.  Pulse 75   Temp 36.4 °C (97.5 °F) (Temporal)   Resp 16   Ht 1.829 m (6')   Wt 106.4 kg (234 lb 9.1 oz)   SpO2 97%   BMI 31.81 kg/m²     SURGICAL SITE ASSESSMENT:      Removed previous dressings to BLE    Bilateral KERON site:   Well approximated with single suture stab sites x3  No drainage, erythema, or edema    Bilateral perc tenotomy sites 2-5  Approximated with single suture. No drainage, erythema, or edema      R 5th MT ulcer:   Moderate yellow s/s drainage  Slough minimally to center.   Tract measures 1.5cm. Bloody drainage    R  4th webspace incision:   Approximated with sutures, no drainage, no erythema.   Edema controlled with tubi        L 2nd webspace incision:   Approximated with sutures  No drainage, no erythema  Edema controlled with tubi      L 2nd/3rd MT ulcer:   Yellow slough non selectively debrided with gauze to red tissue.  Tract measures 1cm bloody drainage       Wound care to be completed by RN:   aquacel ag to R 5th MTh and L 2nd/3rd MTH ulcers, non ad foam, hypafix tape  R 4th webspace incision and L 2nd webpsace incision:  non ad foam, hypafix tape  Tenotomy sites: dry gauze  KERON sites: ad foam  Replaced tubis and PRAFO bilaterally        Education provided to patient regarding the importance and the reason why he needs to stay in offloading boot/PRAFO boot continuously for a total of 6 weeks.    DIABETES MANAGEMENT:  Blood glucose: 174  A1c:   Lab Results   Component Value Date/Time    HBA1C 10.3 (H) 06/26/2019 01:13 AM        Diabetes education: seen patient on 7/1/2019    INFECTION MANAGEMENT:  WBC: 6  Wound culture results:   Results     Procedure Component Value Units Date/Time    AFB Culture [[id number]] Collected:  06/28/19 1658    Order Status:  Completed Specimen:  Tissue Updated:  07/02/19 1004     Significant Indicator NEG     Source TISS     Site 5th Right Metatarsal     Culture Result Culture in progress.     AFB Smear Results No acid fast bacilli seen.    Narrative:       Surgery Specimen    Fungal Culture [[id number]] Collected:  06/28/19 1658    Order Status:  Completed Specimen:  Tissue Updated:  07/02/19 1004     Significant Indicator NEG     Source TISS     Site 5th Right Metatarsal     Culture Result No fungal growth to date.    Narrative:       Surgery Specimen    Anaerobic Culture [[id number]] Collected:  06/28/19 1658    Order Status:  Completed Specimen:  Tissue Updated:  07/02/19 1004     Significant Indicator NEG     Source TISS     Site 5th Right Metatarsal     Culture Result No Anaerobes isolated.     Narrative:       Surgery Specimen    CULTURE TISSUE W/ GRM STAIN [[id number]] Collected:  06/28/19 1647    Order Status:  Completed Specimen:  Tissue Updated:  07/02/19 1004     Significant Indicator NEG     Source TISS     Site 2nd and 3rd Left Metatarsals     Culture Result No growth at 72 hours.     Gram Stain Result No organisms seen.    Narrative:       Surgery Specimen    AFB Culture [[id number]] Collected:  06/28/19 1647    Order Status:  Completed Specimen:  Tissue Updated:  07/02/19 1004     Significant Indicator NEG     Source TISS     Site 2nd and 3rd Left Metatarsals     Culture Result Culture in progress.     AFB Smear Results No acid fast bacilli seen.    Narrative:       Surgery Specimen    Anaerobic Culture [[id number]] Collected:  06/28/19 1647    Order Status:  Completed Specimen:  Tissue Updated:  07/02/19 1004     Significant Indicator NEG     Source TISS     Site 2nd and 3rd Left Metatarsals     Culture Result Culture in progress.    Narrative:       Surgery Specimen    Fungal Culture [[id number]] Collected:  06/28/19 1647    Order Status:  Completed Specimen:  Tissue Updated:  07/02/19 1004     Significant Indicator NEG     Source TISS     Site 2nd and 3rd Left Metatarsals     Culture Result No fungal growth to date.    Narrative:       Surgery Specimen    CULTURE TISSUE W/ GRM STAIN [[id number]]  (Abnormal)  (Susceptibility) Collected:  06/28/19 1658    Order Status:  Completed Specimen:  Tissue Updated:  07/02/19 1004     Significant Indicator POS (POS)     Source TISS     Site 5th Right Metatarsal     Culture Result Growth noted after further incubation, see below for  organism identification.   (A)     Gram Stain Result No organisms seen.     Culture Result Enterococcus faecalis  Rare growth  Combination therapy with ampicillin, penicillin, or  vancomycin (for susceptible strains) plus an aminoglycoside  is usually indicated for serious enterococcal infections,  such as endocarditis unless  "high-level resistance to both  gentamicin and streptomycin is documented; such combinations  are predicted to result in synergistic killing of the  Enterococcus.   (A)    Narrative:       Surgery Specimen    Culture & Susceptibility     ENTEROCOCCUS FAECALIS     Antibiotic Sensitivity Microscan Unit Status    Ampicillin Sensitive <=2 mcg/mL Final    Method: PETTY    Daptomycin Sensitive 1 mcg/mL Final    Method: PETTY    Gent Synergy Sensitive <=500 mcg/mL Final    Method: PETTY    Penicillin Sensitive 2 mcg/mL Final    Method: PETTY    Vancomycin Sensitive 2 mcg/mL Final    Method: PETTY                       BLOOD CULTURE [[id number]] Collected:  06/25/19 1237    Order Status:  Completed Specimen:  Blood from Peripheral Updated:  06/30/19 1500     Significant Indicator NEG     Source BLD     Site PERIPHERAL     Culture Result No growth after 5 days of incubation.    Narrative:       Per Hospital Policy: Only change Specimen Src: to \"Line\" if  specified by physician order.  No site indicated    Blood Culture [[id number]] Collected:  06/25/19 1342    Order Status:  Completed Specimen:  Blood from Peripheral Updated:  06/30/19 1500     Significant Indicator NEG     Source BLD     Site PERIPHERAL     Culture Result No growth after 5 days of incubation.    Narrative:       Per Hospital Policy: Only change Specimen Src: to \"Line\" if  specified by physician order.  Right Hand    GRAM STAIN [[id number]] Collected:  06/28/19 1658    Order Status:  Completed Specimen:  Tissue Updated:  06/29/19 2315     Significant Indicator .     Source TISS     Site 5th Right Metatarsal     Gram Stain Result No organisms seen.    Narrative:       Surgery Specimen    Acid Fast Stain [[id number]] Collected:  06/28/19 1658    Order Status:  Completed Specimen:  Tissue Updated:  06/29/19 2315     Significant Indicator NEG     Source TISS     Site 5th Right Metatarsal     AFB Smear Results No acid fast bacilli seen.    Narrative:       Surgery Specimen    " GRAM STAIN [[id number]] Collected:  06/28/19 1647    Order Status:  Completed Specimen:  Tissue Updated:  06/29/19 2315     Significant Indicator .     Source TISS     Site 2nd and 3rd Left Metatarsals     Gram Stain Result No organisms seen.    Narrative:       Surgery Specimen    Acid Fast Stain [[id number]] Collected:  06/28/19 1647    Order Status:  Completed Specimen:  Tissue Updated:  06/29/19 2315     Significant Indicator NEG     Source TISS     Site 2nd and 3rd Left Metatarsals     AFB Smear Results No acid fast bacilli seen.    Narrative:       Surgery Specimen                 PLAN:  POD # 4 bilateral KERON, right fifth MTH excision, left second and third MTH excision, bilateral 2 through 5 percutaneous tenotomies  Incisions approximated. Ulcers with sang drainage. Cellulitis controlled.       Wound care: Wound care orders in place.  Orders updated for nursing    Antibiotics: Per ID, 6 weeks of IV antibiotics    Weight Bearing Status: WBAT to BLE    Offloading: Patient to wear offloading/PRAFO boots bilaterally at all times for total of 6 weeks from day of surgery.  End date 8/9/2019.  Patient may wear black PRAFO boots in bed but will need to wear tall offloading boots when out of bed    PT Consult: Involved saw patient on 6/29/2019    Diabetes Education: Involved saw patient on 7/1/2019    Plan to return to [facility] No further plans at this time      D/W: pt, Eileen RN, Dr. Tinajero    DISCHARGE PLAN:    Disposition: Referrals to SNF for wound care and IV antibiotics.    Follow-up: LPS rounds scheduled for 7/19; sutures to be removed approximately 3 weeks post-op at this appointment.  Referral placed          ELISABETH LomasRRitoN.    If any questions or concerns, please call [phone]

## 2019-07-02 NOTE — PROGRESS NOTES
LIMB PRESERVATION SERVICE      HPI:  79 y.o. male, with a past medical history that includes type 2 diabetes, falls, COPD, wears O2 at night, venous stasis admitted 6/25/2019 for Bilateral lower extremity edema.     LPS has been consulted for L 2nd MTH ulcer and R 5th MTH ulcer.    Pt was recently discharged from [facility] wound clinic about 2-3 wks ago once ulcers resolved. He was fit with diabetic shoes but has yet to wear them. He was also measured for compression stockings but has not received them yet.   He is unaware that the ulcers to feet have reopened    SURGERY DATE: 6/20/2019 by Dr. Cooper    PROCEDURE:    1.  Bilateral tendo-Achilles lengthening.  2.  Bilateral second, third, fourth and fifth percutaneous flexor tenotomies.  3.  Right fifth metatarsal head excision.  4.  Left second metatarsal head excision.  5.  Left third metatarsal head excision.  6.  Irrigation and debridement of skin, subcutaneous tissue to fascia, left   lower extremity.  7.  Irrigation and debridement of skin, subcutaneous tissue to bone, right   lower extremity.        7/1: Patient refusing to wear offloading boots or PRAFO boots.  States they are too heavy.  Wearing a float boots while in bed.  Feet and plantar flexion.  Patient denies fevers, chills, nausea, vomiting.  Pain well controlled.     /65   Pulse 79   Temp 36.2 °C (97.2 °F) (Temporal)   Resp 16   Ht 1.829 m (6')   Wt 109.4 kg (241 lb 2.9 oz)   SpO2 100%   BMI 32.71 kg/m²     SURGICAL SITE ASSESSMENT:      Bilateral foot dressings clean dry and intact    Patient agreeable to wearing PRAFO boots while in bed.  Applied PRAFO boots to BLE    Education provided to patient regarding the importance and the reason why he needs to stay in offloading boot/PRAFO boot continuously for a total of 6 weeks.    DIABETES MANAGEMENT:  Blood glucose: 182  A1c:   Lab Results   Component Value Date/Time    HBA1C 10.3 (H) 06/26/2019 01:13 AM        Diabetes education:  seen patient on 7/1/2019    INFECTION MANAGEMENT:  WBC: 6  Wound culture results:   Results     Procedure Component Value Units Date/Time    CULTURE TISSUE W/ GRM STAIN [[id number]]  (Abnormal)  (Susceptibility) Collected:  06/28/19 1658    Order Status:  Completed Specimen:  Tissue Updated:  07/01/19 1533     Significant Indicator POS (POS)     Source TISS     Site 5th Right Metatarsal     Culture Result Growth noted after further incubation, see below for  organism identification.   (A)     Gram Stain Result No organisms seen.     Culture Result Enterococcus faecalis  Rare growth  Combination therapy with ampicillin, penicillin, or  vancomycin (for susceptible strains) plus an aminoglycoside  is usually indicated for serious enterococcal infections,  such as endocarditis unless high-level resistance to both  gentamicin and streptomycin is documented; such combinations  are predicted to result in synergistic killing of the  Enterococcus.   (A)    Narrative:       Surgery Specimen    Culture & Susceptibility     ENTEROCOCCUS FAECALIS     Antibiotic Sensitivity Microscan Unit Status    Ampicillin Sensitive <=2 mcg/mL Final    Method: PETTY    Daptomycin Sensitive 1 mcg/mL Final    Method: PETTY    Gent Synergy Sensitive <=500 mcg/mL Final    Method: PETTY    Penicillin Sensitive 2 mcg/mL Final    Method: PETTY    Vancomycin Sensitive 2 mcg/mL Final    Method: PETTY                       AFB Culture [[id number]] Collected:  06/28/19 1658    Order Status:  Completed Specimen:  Tissue Updated:  07/01/19 1533     Significant Indicator NEG     Source TISS     Site 5th Right Metatarsal     Culture Result Culture in progress.     AFB Smear Results No acid fast bacilli seen.    Narrative:       Surgery Specimen    Fungal Culture [[id number]] Collected:  06/28/19 1658    Order Status:  Completed Specimen:  Tissue Updated:  07/01/19 1533     Significant Indicator NEG     Source TISS     Site 5th Right Metatarsal     Culture Result Culture  "in progress.    Narrative:       Surgery Specimen    Anaerobic Culture [[id number]] Collected:  06/28/19 1658    Order Status:  Completed Specimen:  Tissue Updated:  07/01/19 1533     Significant Indicator NEG     Source TISS     Site 5th Right Metatarsal     Culture Result No Anaerobes isolated.    Narrative:       Surgery Specimen    CULTURE TISSUE W/ GRM STAIN [[id number]] Collected:  06/28/19 1647    Order Status:  Completed Specimen:  Tissue Updated:  07/01/19 0855     Significant Indicator NEG     Source TISS     Site 2nd and 3rd Left Metatarsals     Culture Result No growth at 72 hours.     Gram Stain Result No organisms seen.    Narrative:       Surgery Specimen    AFB Culture [[id number]] Collected:  06/28/19 1647    Order Status:  Completed Specimen:  Tissue Updated:  07/01/19 0855     Significant Indicator NEG     Source TISS     Site 2nd and 3rd Left Metatarsals     Culture Result Culture in progress.     AFB Smear Results No acid fast bacilli seen.    Narrative:       Surgery Specimen    Anaerobic Culture [[id number]] Collected:  06/28/19 1647    Order Status:  Completed Specimen:  Tissue Updated:  07/01/19 0855     Significant Indicator NEG     Source TISS     Site 2nd and 3rd Left Metatarsals     Culture Result Culture in progress.    Narrative:       Surgery Specimen    Fungal Culture [[id number]] Collected:  06/28/19 1647    Order Status:  Completed Specimen:  Tissue Updated:  07/01/19 0855     Significant Indicator NEG     Source TISS     Site 2nd and 3rd Left Metatarsals     Culture Result Culture in progress.    Narrative:       Surgery Specimen    BLOOD CULTURE [[id number]] Collected:  06/25/19 1237    Order Status:  Completed Specimen:  Blood from Peripheral Updated:  06/30/19 1500     Significant Indicator NEG     Source BLD     Site PERIPHERAL     Culture Result No growth after 5 days of incubation.    Narrative:       Per Hospital Policy: Only change Specimen Src: to \"Line\" if  specified by " "physician order.  No site indicated    Blood Culture [[id number]] Collected:  06/25/19 1342    Order Status:  Completed Specimen:  Blood from Peripheral Updated:  06/30/19 1500     Significant Indicator NEG     Source BLD     Site PERIPHERAL     Culture Result No growth after 5 days of incubation.    Narrative:       Per Hospital Policy: Only change Specimen Src: to \"Line\" if  specified by physician order.  Right Hand    GRAM STAIN [[id number]] Collected:  06/28/19 1658    Order Status:  Completed Specimen:  Tissue Updated:  06/29/19 2315     Significant Indicator .     Source TISS     Site 5th Right Metatarsal     Gram Stain Result No organisms seen.    Narrative:       Surgery Specimen    Acid Fast Stain [[id number]] Collected:  06/28/19 1658    Order Status:  Completed Specimen:  Tissue Updated:  06/29/19 2315     Significant Indicator NEG     Source TISS     Site 5th Right Metatarsal     AFB Smear Results No acid fast bacilli seen.    Narrative:       Surgery Specimen    GRAM STAIN [[id number]] Collected:  06/28/19 1647    Order Status:  Completed Specimen:  Tissue Updated:  06/29/19 2315     Significant Indicator .     Source TISS     Site 2nd and 3rd Left Metatarsals     Gram Stain Result No organisms seen.    Narrative:       Surgery Specimen    Acid Fast Stain [[id number]] Collected:  06/28/19 1647    Order Status:  Completed Specimen:  Tissue Updated:  06/29/19 2315     Significant Indicator NEG     Source TISS     Site 2nd and 3rd Left Metatarsals     AFB Smear Results No acid fast bacilli seen.    Narrative:       Surgery Specimen    CULTURE WOUND W/ GRAM STAIN [[id number]] Collected:  06/25/19 0000    Order Status:  Canceled Specimen:  Other from Left Foot                  PLAN:  POD # 3 bilateral KERON, right fifth MTH excision, left second and third MTH excision, bilateral 2 through 5 percutaneous tenotomies      Wound care: Wound care orders in place.  Will assess wounds tomorrow.    Antibiotics: Per ID, 6 " weeks of IV antibiotics    Weight Bearing Status: WBAT to BLE    Offloading: Patient to wear offloading/PRAFO boots bilaterally at all times for total of 6 weeks from day of surgery.  End date 8/9/2019.  Patient may wear black PRAFO boots in bed but will need to wear tall offloading boots when out of bed    PT Consult: Involved saw patient on 6/29/2019    Diabetes Education: Involved saw patient on 7/1/2019    Plan to return to [facility] No further plans at this time      DISCHARGE PLAN:    Disposition: Referrals to SNF for wound care and IV antibiotics.    Follow-up: LPS rounds scheduled for 7/19; sutures to be removed approximately 3 weeks post-op at this appointment.  Referral placed          Karely Staples A.P.R.N.    If any questions or concerns, please call [phone]

## 2019-07-02 NOTE — CONSULTS
"Diabetes education: Met with David, who has a hx of diabetes. Pt was admitted with blood sugar of 352 and Hg a1c of 10.3%.   Pt states he was at [facility], got discharged, wound got worse, was admitted to [facility] and \"now they want to send me back to [facility]\". Pt uses oral agents for his diabetes, is agreeable to take insulin in the hospital but \" I will not take insulin at home\".  Pt states he eats breakfast consistently but only occasionally eats lunch and or dinner. Pt states he has a meter at home and test his blood sugars once a day.  Pt is currently on Lantus 10 units pm and Regular insulin sliding scale coverage tid with meals. Current blood sugars are 141, 163 (2 units) and 182 ( 2 units).  Plan: CDE will continue to follow. Please call if needs change.  "

## 2019-07-02 NOTE — PROGRESS NOTES
Assumed care at 1900, bedside report received from day shift RN Eileen. Pt is medical and not on the telemetry monitor. Patient is AO x 4 and is resting in bed with even respirations. Initial assessment completed and orders reviewed. POC addressed with patient. Call light within reach and hourly rounding in place. No further questions at this time. Fall precautions in place. Cam boots on patient.

## 2019-07-02 NOTE — PROGRESS NOTES
Diabetes education: Met with pt this afternoon. Please see consult note.  CDE will continue to follow. Please call [phone] if needs change.

## 2019-07-02 NOTE — PROGRESS NOTES
2 Rn skin check with RY Rosales:    Generalized bruising and scabbing  Abrasions to right elbow and left elbow, biotin in place  Abrasions to right and left knees, biotin in place on left knee  Ears red and blanching, left ear slow to mercedes  Shins jose carlos bilaterally with scabbing  Wound to right plantar foot, wound bed red and pink and sutures to incision site lateral foot, well approximated and pink. No signes of discharge    Wound to left plantar foot and sutures to left dorsal foot below second metatarsal, well approximated and pink  Bottom is red and blanching     Incisions to bilateral achilles tendons, sutures in place and well approximated  Q 2 turns in place, oxygen offloaded to cheeks, heels floated on pillows, martha cream in use on sacrum, dressing changes per protocol, compression stockings in place

## 2019-07-02 NOTE — PROGRESS NOTES
2 RN skin check complete with RY Christianson  Devices in place Oxygen tubing offloaded to face, instead of ears;  compression stockings, ortho boots;  peripheral IV.  Skin assessed under devices: yes, intact with the exception of non-blanchable redness to R ear.  Confirmed pressure ulcers found on: Non-blanchable redness to R ear.  New potential pressure ulcers; N/A; Wound consult placed: Yes.  The following interventions in place waffle mattress, Q2Turn, Silicone nasal cannula, oxygen offloated to face, instead of ears; ortho boots.     Generalized bruising and scabbing.  Abrasion left knee  Scab to right knee  Bottom red, blanching  Scab to left elbow and right elbow. Open scab to R elbow/tricep.  Bilateral shins jose carlos with weeping  Surgical incisions to bilateral posterior legs with sutures intact; Surrounding skin intact.  Sutures to R lateral, dorsal foot with surrounding skin intact.  Sutures to R metatarsals on plantar aspect.  Sutures to L dorsal foot below 2nd metatarsal with surrounding skin intact.   Sutures to L metatarsals on plantar aspect.  R plantar foot wound. Wound bed color: Red. Wound edges asymmetrical  L plantar foot wound with purulent discharge. Wound bed color pink.

## 2019-07-03 LAB
ANION GAP SERPL CALC-SCNC: 7 MMOL/L (ref 0–11.9)
BACTERIA SPEC ANAEROBE CULT: NORMAL
BUN SERPL-MCNC: 33 MG/DL (ref 8–22)
CALCIUM SERPL-MCNC: 8.8 MG/DL (ref 8.5–10.5)
CHLORIDE SERPL-SCNC: 101 MMOL/L (ref 96–112)
CO2 SERPL-SCNC: 38 MMOL/L (ref 20–33)
CREAT SERPL-MCNC: 1.58 MG/DL (ref 0.5–1.4)
ERYTHROCYTE [DISTWIDTH] IN BLOOD BY AUTOMATED COUNT: 52.8 FL (ref 35.9–50)
GLUCOSE BLD-MCNC: 137 MG/DL (ref 65–99)
GLUCOSE BLD-MCNC: 174 MG/DL (ref 65–99)
GLUCOSE BLD-MCNC: 246 MG/DL (ref 65–99)
GLUCOSE SERPL-MCNC: 160 MG/DL (ref 65–99)
HCT VFR BLD AUTO: 33.9 % (ref 42–52)
HGB BLD-MCNC: 9.8 G/DL (ref 14–18)
MCH RBC QN AUTO: 28.9 PG (ref 27–33)
MCHC RBC AUTO-ENTMCNC: 28.9 G/DL (ref 33.7–35.3)
MCV RBC AUTO: 100 FL (ref 81.4–97.8)
MORPHOLOGY BLD-IMP: NORMAL
PLATELET # BLD AUTO: 179 K/UL (ref 164–446)
PMV BLD AUTO: 10.4 FL (ref 9–12.9)
POTASSIUM SERPL-SCNC: 3.7 MMOL/L (ref 3.6–5.5)
RBC # BLD AUTO: 3.39 M/UL (ref 4.7–6.1)
SIGNIFICANT IND 70042: NORMAL
SITE SITE: NORMAL
SODIUM SERPL-SCNC: 146 MMOL/L (ref 135–145)
SOURCE SOURCE: NORMAL
WBC # BLD AUTO: 6 K/UL (ref 4.8–10.8)

## 2019-07-03 PROCEDURE — 99232 SBSQ HOSP IP/OBS MODERATE 35: CPT | Performed by: INTERNAL MEDICINE

## 2019-07-03 PROCEDURE — 82962 GLUCOSE BLOOD TEST: CPT

## 2019-07-03 PROCEDURE — 700111 HCHG RX REV CODE 636 W/ 250 OVERRIDE (IP): Performed by: INTERNAL MEDICINE

## 2019-07-03 PROCEDURE — A9270 NON-COVERED ITEM OR SERVICE: HCPCS | Performed by: INTERNAL MEDICINE

## 2019-07-03 PROCEDURE — 80048 BASIC METABOLIC PNL TOTAL CA: CPT

## 2019-07-03 PROCEDURE — 700102 HCHG RX REV CODE 250 W/ 637 OVERRIDE(OP): Performed by: INTERNAL MEDICINE

## 2019-07-03 PROCEDURE — 700105 HCHG RX REV CODE 258: Performed by: INTERNAL MEDICINE

## 2019-07-03 PROCEDURE — 85027 COMPLETE CBC AUTOMATED: CPT

## 2019-07-03 PROCEDURE — 770006 HCHG ROOM/CARE - MED/SURG/GYN SEMI*

## 2019-07-03 RX ADMIN — INSULIN GLARGINE 10 UNITS: 100 INJECTION, SOLUTION SUBCUTANEOUS at 18:09

## 2019-07-03 RX ADMIN — LOSARTAN POTASSIUM 25 MG: 25 TABLET ORAL at 05:27

## 2019-07-03 RX ADMIN — HEPARIN SODIUM 5000 UNITS: 5000 INJECTION, SOLUTION INTRAVENOUS; SUBCUTANEOUS at 23:40

## 2019-07-03 RX ADMIN — CARVEDILOL 6.25 MG: 6.25 TABLET, FILM COATED ORAL at 05:28

## 2019-07-03 RX ADMIN — INSULIN HUMAN 3 UNITS: 100 INJECTION, SOLUTION PARENTERAL at 12:27

## 2019-07-03 RX ADMIN — CARVEDILOL 6.25 MG: 6.25 TABLET, FILM COATED ORAL at 18:07

## 2019-07-03 RX ADMIN — AMPICILLIN SODIUM AND SULBACTAM SODIUM 3 G: 2; 1 INJECTION, POWDER, FOR SOLUTION INTRAMUSCULAR; INTRAVENOUS at 12:27

## 2019-07-03 RX ADMIN — AMPICILLIN SODIUM AND SULBACTAM SODIUM 3 G: 2; 1 INJECTION, POWDER, FOR SOLUTION INTRAMUSCULAR; INTRAVENOUS at 23:41

## 2019-07-03 RX ADMIN — ATORVASTATIN CALCIUM 20 MG: 20 TABLET, FILM COATED ORAL at 05:27

## 2019-07-03 RX ADMIN — INSULIN HUMAN 2 UNITS: 100 INJECTION, SOLUTION PARENTERAL at 18:09

## 2019-07-03 RX ADMIN — AMPICILLIN SODIUM AND SULBACTAM SODIUM 3 G: 2; 1 INJECTION, POWDER, FOR SOLUTION INTRAMUSCULAR; INTRAVENOUS at 05:26

## 2019-07-03 RX ADMIN — ASPIRIN 81 MG 81 MG: 81 TABLET ORAL at 05:27

## 2019-07-03 RX ADMIN — SENNOSIDES, DOCUSATE SODIUM 2 TABLET: 50; 8.6 TABLET, FILM COATED ORAL at 18:07

## 2019-07-03 RX ADMIN — FAMOTIDINE 20 MG: 20 TABLET ORAL at 05:27

## 2019-07-03 RX ADMIN — AMPICILLIN SODIUM AND SULBACTAM SODIUM 3 G: 2; 1 INJECTION, POWDER, FOR SOLUTION INTRAMUSCULAR; INTRAVENOUS at 18:13

## 2019-07-03 RX ADMIN — HEPARIN SODIUM 5000 UNITS: 5000 INJECTION, SOLUTION INTRAVENOUS; SUBCUTANEOUS at 05:26

## 2019-07-03 RX ADMIN — HEPARIN SODIUM 5000 UNITS: 5000 INJECTION, SOLUTION INTRAVENOUS; SUBCUTANEOUS at 14:30

## 2019-07-03 RX ADMIN — TORSEMIDE 100 MG: 100 TABLET ORAL at 05:27

## 2019-07-03 ASSESSMENT — ENCOUNTER SYMPTOMS
FEVER: 0
NAUSEA: 0
DIAPHORESIS: 0
NERVOUS/ANXIOUS: 0
DIARRHEA: 0
BACK PAIN: 0
PHOTOPHOBIA: 0
BLURRED VISION: 0
FALLS: 0
SENSORY CHANGE: 0
CHILLS: 0
EYE DISCHARGE: 0
DIZZINESS: 0
HEARTBURN: 0
SHORTNESS OF BREATH: 0
NECK PAIN: 0
ABDOMINAL PAIN: 0
TINGLING: 0
HEADACHES: 0
COUGH: 1
VOMITING: 0
WEAKNESS: 0
MYALGIAS: 0
PALPITATIONS: 0
CONSTIPATION: 0
DEPRESSION: 0
COUGH: 0

## 2019-07-03 ASSESSMENT — LIFESTYLE VARIABLES: SUBSTANCE_ABUSE: 0

## 2019-07-03 NOTE — CARE PLAN
Problem: Urinary Elimination:  Goal: Ability to reestablish a normal urinary elimination pattern will improve  Outcome: PROGRESSING AS EXPECTED  Condom catheter in place; monitoring for s/sx of infection;monitoring output; will continue to monitor    Problem: Pain Management  Goal: Pain level will decrease to patient's comfort goal  Outcome: PROGRESSING AS EXPECTED  Assess pain Q4; offer appropriate interventions; educated on non-pharmacologic comfort measures

## 2019-07-03 NOTE — PROGRESS NOTES
Infectious Disease Progress Note    Author: SALUD Petty Date & Time of service: 7/3/2019  2:03 PM       Chief Complaint:  Bilateral diabetic foot wounds     Interval History:  79-year-old male admitted on 2019 due to worsening bilateral foot wounds and worsening CHF.      -AF, WBC 5.5, no issue with antibiotics, denies pain, denies being short of breath.   Afebrile, white count 7000.  Patient tolerating antibiotics, no new issues.  -AF, no WBC, no issue with antibiotics, denies pain stating he feels just fine, complaining that he has not been able to get up and walk around.  7/3-AF, WBC 6.0, tolerating antibiotics, denies pain, agreeable to SNF placement.    Labs, medications and wounds reviewed.      Review of Systems:  Review of Systems   Constitutional: Negative for chills, diaphoresis, fever and malaise/fatigue.   Respiratory: Negative for cough and shortness of breath.    Cardiovascular: Positive for leg swelling. Negative for chest pain.   Gastrointestinal: Negative for abdominal pain, constipation, diarrhea, nausea and vomiting.   Genitourinary: Negative for dysuria.   Musculoskeletal: Negative for back pain, joint pain and myalgias.   Skin: Negative for itching and rash.   Neurological: Negative for sensory change and headaches.   Psychiatric/Behavioral: The patient is not nervous/anxious.        Hemodynamics:  Temp (24hrs), Av.7 °C (98 °F), Min:36.3 °C (97.4 °F), Max:37.1 °C (98.8 °F)  Temperature: 36.4 °C (97.6 °F)  Pulse  Av  Min: 69  Max: 105   Blood Pressure : 106/54       Physical Exam:  Physical Exam   Constitutional: He is oriented to person, place, and time. He appears well-developed and well-nourished.   Elderly  Disheveled  Obese   HENT:   Head: Normocephalic and atraumatic.   Mouth/Throat: Oropharynx is clear and moist. No oropharyngeal exudate.   Poor dentition-edentulous   Eyes: Pupils are equal, round, and reactive to light. Conjunctivae and EOM are  normal.   Neck: Normal range of motion. Neck supple. No JVD present.   Cardiovascular: Normal rate, regular rhythm and normal heart sounds.  Exam reveals no gallop and no friction rub.    Faint distal pulses to BLE   Pulmonary/Chest: Effort normal. No respiratory distress. He has no rales.   2.5 L nasal cannula  Decreased breath sounds to BLL   Abdominal: Soft. Bowel sounds are normal. He exhibits no distension. There is no tenderness.   Genitourinary:   Genitourinary Comments: Condom cath in place with clear yellow urine   Musculoskeletal: He exhibits edema (+1 BLE) and deformity. He exhibits no tenderness.   Bilateral Achilles-sutures in place, no active drainage, no erythema, nontender to palpation.  Left second/third and right fifth MTH incisions-sutures in place, no active drainage, trace erythema along incisions.  Tenotomy sites with sutures in place, no active drainage, no erythema.  Right plantar ulceration-wound bed pink with packing in place to small section, scant serous drainage, no odor, no erythema or maceration to tissue, nontender to palpation.  Left plantar ulceration-wound bed packed with silver dressing, no erythema or maceration to surrounding tissue, nontender to palpation.    RUE PICC- CDI, non tender, no erythema.   Neurological: He is alert and oriented to person, place, and time. No cranial nerve deficit. Coordination normal.   Skin: Skin is warm and dry. No rash noted. He is not diaphoretic. There is erythema.   Psychiatric: He has a normal mood and affect. His behavior is normal.   Nursing note and vitals reviewed.      Meds:    Current Facility-Administered Medications:   •  ampicillin-sulbactam (UNASYN) IV  •  insulin glargine  •  carvedilol  •  torsemide  •  losartan  •  insulin regular **AND** Accu-Chek ACHS **AND** NOTIFY MD and PharmD **AND** glucose **AND** dextrose 10% bolus  •  Respiratory Care per Protocol  •  senna-docusate **AND** polyethylene glycol/lytes **AND** magnesium  hydroxide **AND** bisacodyl  •  heparin  •  acetaminophen  •  albuterol  •  aspirin  •  atorvastatin  •  famotidine    Labs:  Recent Labs      07/01/19   0019 07/03/19   0605   WBC  6.0  6.0   RBC  3.18*  3.39*   HEMOGLOBIN  9.3*  9.8*   HEMATOCRIT  31.9*  33.9*   MCV  100.3*  100.0*   MCH  29.2  28.9   RDW  53.1*  52.8*   PLATELETCT  176  179   MPV  10.6  10.4   NEUTSPOLYS  68.70   --    LYMPHOCYTES  15.10*   --    MONOCYTES  12.70   --    EOSINOPHILS  2.70   --    BASOPHILS  0.30   --    RBCMORPHOLO  Present   --      Recent Labs      07/01/19   0019  07/03/19   0605   SODIUM  140  146*   POTASSIUM  4.0  3.7   CHLORIDE  104  101   CO2  31  38*   GLUCOSE  233*  160*   BUN  32*  33*     Recent Labs      07/01/19 0019 07/03/19   0605   CREATININE  1.44*  1.58*       Imaging:  No new imaging within the last 24 hours.    Micro:  Results     Procedure Component Value Units Date/Time    CULTURE TISSUE W/ GRM STAIN [[id number]] Collected:  06/28/19 1647    Order Status:  Completed Specimen:  Tissue Updated:  07/03/19 1348     Significant Indicator NEG     Source TISS     Site 2nd and 3rd Left Metatarsals     Culture Result No growth at 72 hours.     Gram Stain Result No organisms seen.    Narrative:       Surgery Specimen    AFB Culture [[id number]] Collected:  06/28/19 1647    Order Status:  Completed Specimen:  Tissue Updated:  07/03/19 1348     Significant Indicator NEG     Source TISS     Site 2nd and 3rd Left Metatarsals     Culture Result Culture in progress.     AFB Smear Results No acid fast bacilli seen.    Narrative:       Surgery Specimen    Anaerobic Culture [[id number]] Collected:  06/28/19 1647    Order Status:  Completed Specimen:  Tissue Updated:  07/03/19 1348     Significant Indicator NEG     Source TISS     Site 2nd and 3rd Left Metatarsals     Culture Result No Anaerobes isolated.    Narrative:       Surgery Specimen    Fungal Culture [[id number]] Collected:  06/28/19 1647    Order Status:  Completed  Specimen:  Tissue Updated:  07/03/19 1348     Significant Indicator NEG     Source TISS     Site 2nd and 3rd Left Metatarsals     Culture Result No fungal growth to date.    Narrative:       Surgery Specimen    AFB Culture [[id number]] Collected:  06/28/19 1658    Order Status:  Completed Specimen:  Tissue Updated:  07/02/19 1004     Significant Indicator NEG     Source TISS     Site 5th Right Metatarsal     Culture Result Culture in progress.     AFB Smear Results No acid fast bacilli seen.    Narrative:       Surgery Specimen    Fungal Culture [[id number]] Collected:  06/28/19 1658    Order Status:  Completed Specimen:  Tissue Updated:  07/02/19 1004     Significant Indicator NEG     Source TISS     Site 5th Right Metatarsal     Culture Result No fungal growth to date.    Narrative:       Surgery Specimen    Anaerobic Culture [[id number]] Collected:  06/28/19 1658    Order Status:  Completed Specimen:  Tissue Updated:  07/02/19 1004     Significant Indicator NEG     Source TISS     Site 5th Right Metatarsal     Culture Result No Anaerobes isolated.    Narrative:       Surgery Specimen    CULTURE TISSUE W/ GRM STAIN [[id number]]  (Abnormal)  (Susceptibility) Collected:  06/28/19 1658    Order Status:  Completed Specimen:  Tissue Updated:  07/02/19 1004     Significant Indicator POS (POS)     Source TISS     Site 5th Right Metatarsal     Culture Result Growth noted after further incubation, see below for  organism identification.   (A)     Gram Stain Result No organisms seen.     Culture Result Enterococcus faecalis  Rare growth  Combination therapy with ampicillin, penicillin, or  vancomycin (for susceptible strains) plus an aminoglycoside  is usually indicated for serious enterococcal infections,  such as endocarditis unless high-level resistance to both  gentamicin and streptomycin is documented; such combinations  are predicted to result in synergistic killing of the  Enterococcus.   (A)    Narrative:       Surgery  "Specimen    Culture & Susceptibility     ENTEROCOCCUS FAECALIS     Antibiotic Sensitivity Microscan Unit Status    Ampicillin Sensitive <=2 mcg/mL Final    Method: PETTY    Daptomycin Sensitive 1 mcg/mL Final    Method: PETTY    Gent Synergy Sensitive <=500 mcg/mL Final    Method: PETTY    Penicillin Sensitive 2 mcg/mL Final    Method: PETTY    Vancomycin Sensitive 2 mcg/mL Final    Method: PETTY                       BLOOD CULTURE [[id number]] Collected:  06/25/19 1237    Order Status:  Completed Specimen:  Blood from Peripheral Updated:  06/30/19 1500     Significant Indicator NEG     Source BLD     Site PERIPHERAL     Culture Result No growth after 5 days of incubation.    Narrative:       Per Hospital Policy: Only change Specimen Src: to \"Line\" if  specified by physician order.  No site indicated    Blood Culture [[id number]] Collected:  06/25/19 1342    Order Status:  Completed Specimen:  Blood from Peripheral Updated:  06/30/19 1500     Significant Indicator NEG     Source BLD     Site PERIPHERAL     Culture Result No growth after 5 days of incubation.    Narrative:       Per Hospital Policy: Only change Specimen Src: to \"Line\" if  specified by physician order.  Right Hand    GRAM STAIN [[id number]] Collected:  06/28/19 1658    Order Status:  Completed Specimen:  Tissue Updated:  06/29/19 2315     Significant Indicator .     Source TISS     Site 5th Right Metatarsal     Gram Stain Result No organisms seen.    Narrative:       Surgery Specimen    Acid Fast Stain [[id number]] Collected:  06/28/19 1658    Order Status:  Completed Specimen:  Tissue Updated:  06/29/19 2315     Significant Indicator NEG     Source TISS     Site 5th Right Metatarsal     AFB Smear Results No acid fast bacilli seen.    Narrative:       Surgery Specimen    GRAM STAIN [[id number]] Collected:  06/28/19 1647    Order Status:  Completed Specimen:  Tissue Updated:  06/29/19 2315     Significant Indicator .     Source TISS     Site 2nd and 3rd Left " Metatarsals     Gram Stain Result No organisms seen.    Narrative:       Surgery Specimen    Acid Fast Stain [[id number]] Collected:  06/28/19 1647    Order Status:  Completed Specimen:  Tissue Updated:  06/29/19 2319     Significant Indicator NEG     Source TISS     Site 2nd and 3rd Left Metatarsals     AFB Smear Results No acid fast bacilli seen.    Narrative:       Surgery Specimen          Assessment:  Active Hospital Problems    Diagnosis   • Diabetic foot infection (HCC) [E11.628, L08.9]   • Venous stasis dermatitis of both lower extremities [I87.2]   • CKD (chronic kidney disease), stage III ([facility]) [N18.3]   • Edema [R60.9]   • Type 2 diabetes mellitus with kidney complication, without long-term current use of insulin ([facility]) [E11.29]     Interval 24 hour assessment:    AF, O2 2.5 L NC      Plan:  Bilateral diabetic foot ulcers with underlying osteomyelitis  Afebrile  No leukocytosis  Blood cultures on 6/25 negative  Underwent bilateral tendon Achilles lengthening, bilateral second third fourth and fifth tenotomies, right fifth metatarsal head excision, left second and third metatarsal head excision on 6/28 with Dr. Cooper.  OR culture of right fifth metatarsal +E faecalis  Being followed by LPS  Continue IV Unasyn 3 g every 6 hours  Plan for 6 weeks IV antibiotics due to positive OR bone culture  Estimated end date 8/9/2019  Recommend SNF placement-[facility] in [place]     Uncontrolled type 2 diabetes  Hemoglobin A1c 10.3% on 6/26/2019  Will adversely affect wound healing and resolution of infection   this a.m.     CKD-III  CrCl~ 50-55  Renally adjust antibiotics as needed  Continue to monitor closely       Discussed case with Jean Marie.  Denied by [facility], looking into [facility].    ID will continue to follow every 2 to 3 days.

## 2019-07-03 NOTE — DISCHARGE PLANNING
Agency/Facility Name: [facility]   Spoke To: Amy  Outcome: Referral received, under review.

## 2019-07-03 NOTE — HEART FAILURE PROGRAM
Cardiovascular Nurse Navigator () Advanced Heart Failure Program Inpatient Consult Note:     Admission 6/25/19 with a chief complaint of swelling of BLE. Has a known history of COPD, DM, CKD, CAD, HTN and PAF for which he has been seen three times in cardiology clinic over the past five years. Prior echo (2013) showed LVH with EF 65%.    Clinic note in 2017 noted no significant HF or fluid overload signs and a repeat echo was ordered. EF was 60% and it was noted no significant change since prior echo in 2013.    On presentation to this facility, he reported a recent fall, worsening BLE edema for 3-4 weeks and wound on L foot draining purulent discharge.    Found to have EF of 35%. Cardiology sign off note on 6/30/19 states permanent AF. This note also indicates that patient has poor prognosis overall and endstage HF with multiple co morbid conditions, high mortality, not candidate for advanced therapies.    Marc has also been found to be dysphagic on this admission with SLP recommending NPO with FEES patient after long discussion with Dr. Zamora, chooses to eat knowing risk.     Dr. Zamora documented extensive discussion with Marc and his family regarding prognosis, code status, ACP, available therapies and alternatives and POC on 6/29/19.     HFrEF (35%)  NYHA: all notes indicate that patent denied SOB upon presentation, endorsed generalized weakness, no one has listed class  De Lina/Exacerbation: de lina  If exacerbation, precipitant: n/a  If de lina, etiology: ischemic per cards notes  Consider for CardioMEMS commercial or GUIDE HF? No, new diagnosis, also poor prognosis  Diuresis: PO torsemide    Demographics:    · Insurance: Medicare and AARP  · Residence: [facility] Secondary Prevention interventions:    · Pneumococcal vaccine:   · Influenza vaccine:   ·     HFrEF GDMT:    · MICHAEL - I: losartan  · Evidence Based BB (bisoprolol, carvedilol, or Toprol XL): carvedilol  · Aldosterone receptor antagonist:  Not currently prescribed, will need to be addressed: prescribed or a provider note indicating why not prescribed, prior to discharge.   · AC for AF: per Dr. Almaraz on 6/30, high risk for bleeding on anticoagulation.       Device Therapy Screening Tool     Patient not eligible at this time.    Criteria for Cardiac Resynchronization Therapy (must meet all 3)     · EF <35%: no  · NYHA Class III or ambulatory Class IV: not being documented  · QRS Duration >120 ms: no      * CRT is a Class I recommendation for patients with Sinus Rhythm    Criteria for Implantable Cardiac Defibrillator (must meet all 3)     · EF <35%: no  · > 40 days post MI: no  · Post revascularization or non-ischemic dilated CMP > 3 months: no       * ICD therapy is a Class I recommendation    Source: [employer] Prevention Program Screening Tool  2013 ACC/ AHA Heart Failure Guidelines  Rev date: 12/2014      BEDSIDE NURSING Daily Weights and Intake and Output already ordered? No - I ordered them    · Every HF patient should have daily weights and I's and O's  · Please weigh patient daily on a standing scale if not clinically contraindicated.   · While doing this, teach patient to write weight down on the Symptom Tracker in the HF book - and ask them what they would do with that weight at home (ie: call for 3# weight gain). This is an excellent opportunity for impactful, in the moment teaching.  Providers rely on your complete documentation of weights and I's and O's to decide whether or not our treatment is working and whether or not a HF patient is ready to discharge!    If pt does not own a working scale and cannot afford to purchase one, please provide one. Scales can be found in the Care Coordination Rooms on T-7&T-8.    [facility] Plan Notes:   Arrangements for SNF  Therapy Notes:   Needs SNF - unfortunately, not documenting functional tolerance of activity  Advanced Care Planning:  No AD on file. DNAR/DNI code status at the time of this note's  "filing.    The ACC recommends engaging palliative care as part of optimization of HFrEF treatment to solicit goals of care and focus on quality of life throughout the clinical course of HF.    Once all diagnostics are in, please consider an order for palliative to discuss Advanced Care Planning.      Speaking with patients frankly about their end-of-life wishes is one of the most important things a palliative care team can do. This is especially important in the context of heart failure, since it’s such an unpredictable disease.    Follow up appointment:   If discharged from acute care to home (exception hospice discharge), pt must have an appointment scheduled within 7 days of discharge (Cardiology, PCP, or DC Clinic).    If discharged to Transitional Care Facility (LTAC, SNF, IRH), appointment should be made about a month out for after TCF.     Bedside Nursing Education:  Please provide HF booklet and repeated, ongoing education while administering medications, weighing patient, discussing management of symptoms, diet and need to follow up and act on changes.    Bedside Nursing Discharge:  When completing the after visit summary (discharge instructions) please select \"Cardiac Diagnosis, and Heart Failure\" in the special instructions section to populate the heart failure specific discharge instructions.     Referrals/Orders Placed:    Hospital Schedulers for HF f/u?  yes  Social Work   No following  Registered Dietician  yes  REMSA CP Program for patients with Medicaid, [facility], or halfway Plus coverage?  no  Outpatient Care Coordination for patients with [employer] coverage and with 3 or more admissions within a year?  no    Sydney HOLBROOK RN, [facility] ext. [phone] M-F        "

## 2019-07-03 NOTE — PROGRESS NOTES
2 RN skin check melchor Cyr.   Devices in place nasal cannula, ortho boots, lower extremity stockings.  Skin assessed under devices yes.  Confirmed pressure ulcers found on none.  New potential pressure ulcers noted on right ear. Wound consult placed yes.  The following interventions in place waffle mattress, frequent repositioning, cheek stickers to offload O2 tubing, pillows to float elbows.     Generalized bruising and scabbing.  Abrasion left knee  Scab to right knee  Bottom red, blanching  Scab to left elbow and right elbow. Open scab to R elbow/tricep  Bilateral shins jose carlos with weeping.  Did not visualize underneath dressings on feet.

## 2019-07-03 NOTE — PROGRESS NOTES
2 Rn skin check with RY Rivera:     Generalized bruising and scabbing  Abrasions to right elbow and left elbow, biotin in place  Abrasions to right and left knees, biotin in place on left knee  Ears red and blanching, left ear slow to mercedes    Bilateral lower bandage C,D,I  Gauze between toes C,D,I      Wound to left plantar foot and sutures to left dorsal foot below second metatarsal, well approximated and pink  Bottom is red and blanching      Q 2 turns in place, oxygen offloaded to cheeks, heels floated on pillows, martha cream in use on sacrum, dressing changes per protocol, compression stockings in place, Orthos boots in place.

## 2019-07-03 NOTE — DISCHARGE PLANNING
Agency/Facility Name: [facility]  Spoke To: Amy  Outcome: Referral pending on site visit with liaison. Liaison will be on site around 1300 on 7/4.

## 2019-07-03 NOTE — PROGRESS NOTES
[facility] Medicine Daily Progress Note    Date of Service  7/3/2019    Chief Complaint  79 y.o. male admitted 6/25/2019 with BLE swelling and diabetic foot infection.    Hospital Course    H/O COPD, CAD, HTN, DM, HLD.  Patient follows with outpatient wound care for chronic, nonhealing bilateral diabetic foot wounds.  Presented to ED after developing worsening erythema and swelling of BLE as well as purulent drainage from foot wounds.  Started on antibiotics.  ID and LPS consulted.  Also found to have mildly elevated troponin with stable EKG.  Suspect volume overload with elevated BNP and edema noted on chest x-ray.  Continue diuresis.  Echo revealing new CHF with EF 35%.  Cardiology consulted.  Pending surgical I&D.  Will need cardiac clearance prior to surgery.          Interval Problem Update  7/1.  Patient tolerated current diet but still showing signs of frequent cough.  Unclear if this is chronic cough although aspiration however patient definitely carried high risk of aspiration.  Previously long discussion regarding choice of oral intake and patient choose to eat with pleasure knowing the risk.  Patient's CODE STATUS already changed to DNR.  Patient still complains of lower extremity edema and tenderness and pain. Patient's pain is local 3-4/10, intermittent and does not radiate to other location, sharp and with some tingling. Can be controlled by pain meds. Dressing in place.  7/2: Pt seen and examined, resting in bed, complains of lower extremity pain, afebrile, family at bedside questions answered. Cont on IV abx as per ID rec.  shaun also order a PICC line as pt will need IV abx for a long period.   7/3: No overnight events , resting in bed, afebrile, no nausea or vomiting. Cont on IV abx, as per ID rec.     Consultants/Specialty  ID  LPS  Cardiology    Code Status  DNR    Disposition  SNF    Review of Systems  Review of Systems   Constitutional: Negative for chills and fever.   HENT: Negative for ear pain  and hearing loss.    Eyes: Negative for blurred vision, photophobia and discharge.   Respiratory: Positive for cough. Negative for shortness of breath.    Cardiovascular: Positive for leg swelling. Negative for chest pain and palpitations.   Gastrointestinal: Negative for abdominal pain, diarrhea, heartburn and vomiting.   Genitourinary: Negative for dysuria, frequency and hematuria.   Musculoskeletal: Negative for falls, myalgias and neck pain.   Skin: Negative for itching and rash.        BLE swelling and erythema.  Bilateral foot wounds.    Neurological: Negative for dizziness, tingling, weakness and headaches.   Psychiatric/Behavioral: Negative for depression and substance abuse.        Physical Exam  Temp:  [36.3 °C (97.4 °F)-37.1 °C (98.8 °F)] 36.4 °C (97.6 °F)  Pulse:  [72-78] 74  Resp:  [14-16] 15  BP: (102-115)/(51-71) 106/54  SpO2:  [78 %-98 %] 94 %    Physical Exam   Constitutional: He is oriented to person, place, and time. He appears well-developed.   Obese male.    HENT:   Head: Normocephalic and atraumatic.   Eyes: Pupils are equal, round, and reactive to light. Conjunctivae and EOM are normal. Right eye exhibits no discharge. Left eye exhibits no discharge.   Neck: Normal range of motion. Neck supple. No JVD present. No thyromegaly present.   Cardiovascular: Normal rate, regular rhythm and normal heart sounds.  Exam reveals no friction rub.    BLE edema   Pulmonary/Chest: Effort normal. No respiratory distress. He has decreased breath sounds in the right lower field and the left lower field. He has wheezes in the right lower field, the left upper field, the left middle field and the left lower field. He exhibits no tenderness.   Patient's cough is weak and patient showing signs of aspiration   Abdominal: Soft. Bowel sounds are normal. He exhibits no distension. There is no tenderness.   Musculoskeletal: Normal range of motion. He exhibits edema and tenderness. He exhibits no deformity.    Neurological: He is alert and oriented to person, place, and time. He displays normal reflexes.   Skin: Skin is warm and dry. No rash noted. There is erythema.   Stasis dermatitis noted to BLE   Bilateral diabetic foot wounds with purulent drainage.  Dressing intact.    Psychiatric: He has a normal mood and affect.   Nursing note and vitals reviewed.      Fluids    Intake/Output Summary (Last 24 hours) at 07/03/19 1331  Last data filed at 07/03/19 1000   Gross per 24 hour   Intake                0 ml   Output             2650 ml   Net            -2650 ml       Laboratory  Recent Labs      07/01/19   0019  07/03/19   0605   WBC  6.0  6.0   RBC  3.18*  3.39*   HEMOGLOBIN  9.3*  9.8*   HEMATOCRIT  31.9*  33.9*   MCV  100.3*  100.0*   MCH  29.2  28.9   MCHC  29.2*  28.9*   RDW  53.1*  52.8*   PLATELETCT  176  179   MPV  10.6  10.4     Recent Labs      07/01/19   0019  07/03/19   0605   SODIUM  140  146*   POTASSIUM  4.0  3.7   CHLORIDE  104  101   CO2  31  38*   GLUCOSE  233*  160*   BUN  32*  33*   CREATININE  1.44*  1.58*   CALCIUM  8.7  8.8                   Imaging  IR-PICC LINE PLACEMENT W/ GUIDANCE > AGE 5   Final Result                  Ultrasound-guided PICC placement performed by qualified nursing staff as    above.          US-RACHEL SINGLE LEVEL BILAT   Final Result      EC-ECHOCARDIOGRAM COMPLETE W/ CONT   Final Result      US-EXTREMITY VENOUS LOWER BILAT   Final Result      DX-SHOULDER 2+ RIGHT   Final Result      1.  No acute findings.      2.  Severe degenerative change in the right acromioclavicular joint.      DX-FOOT-COMPLETE 3+ RIGHT   Final Result      1.  Lucency in the right fifth toe, possibly representing osteomyelitis. If clinically indicated, MRI could be performed for confirmation.      2.  Soft tissue edema within the forefoot.      DX-FOOT-COMPLETE 3+ LEFT   Final Result      1.  No definitive evidence for osteomyelitis.      2.  Forefoot deformities as described.      DX-CHEST-PORTABLE (1  VIEW)   Final Result      Bilateral interstitial opacities may represent interstitial edema or pneumonitis.      Mild cardiomegaly.      Atherosclerotic plaque.              Assessment/Plan    Acute systolic CHF (congestive heart failure) (HCC)   Assessment & Plan    Echo reveals EF of 35%  New diagnosis  Known CAD with multivessel disease  Mild volume overload undergoing diuresis  Seen by cardiology  Continue coreg   Continue diuretics, changed to torsemide by cardiologist.       Diabetic foot infection (HCC)   Assessment & Plan    History of chronic diabetic wounds to bilateral feet.  Has been following with outpatient wound clinic  Recent worsening of wounds with purulent drainage.   He found to have possible right foot osteomyelitis on x-ray.  S/p I&D by ortho   ID following.  Now on Unasyn as per ID rec, cefepime and zyvox d/c   LPS following.       Oropharyngeal dysphagia- (present on admission)   Assessment & Plan    -Patient has very weak cough during the day however patient said this is his baseline.  -Noticed significant risk of aspiration evaluated by bedside nurse, me, and speech therapist.  -Per speech therapist recommend patient to have fees test however patient refused.  -Further discussion about the risk of aspiration patient would like to take the risk and eat for pleasure.  -Patient also wants to change CODE STATUS to DNR.  -Patient currently is competent to make medical decision.  We will respect patient wishes and start patient on safest diet currently as dysphagia II with nectar thick.     Continue modified diet to reduce relatively risk of aspiration     DNR (do not resuscitate)   Assessment & Plan    Patient is competent to make medical decision.  CODE STATUS remain on DNR     Venous stasis dermatitis of both lower extremities   Assessment & Plan    Secondary to chronic edema  Continue diuretics, changed to torsemide by cardiologist.  Skin care.      Macrocytosis   Assessment & Plan    TSH,  vitamin B12 and folic acid wnl.     Elevated troponin   Assessment & Plan    He found to have mildly elevated troponin.  He denies any symptoms of chest pain.  EKG stable  ? Stress induced secondary to volume overload  Echo shows EF 35%.  Continue diuretics, changed to torsemide by cardiologist.  Appreciate rec.        CKD (chronic kidney disease), stage III ([facility])- (present on admission)   Assessment & Plan    History of chronic kidney disease.  Currently renal functions are at the baseline.  Avoid nephrotoxins.  Continue to monitor  Kidney function remained stable.     CAD (coronary artery disease)- (present on admission)   Assessment & Plan    Hx of  Continue lipitor and ASA.      Mixed hyperlipidemia- (present on admission)   Assessment & Plan    Continue lipitor.      Edema- (present on admission)   Assessment & Plan    Hx of chronic BLE edema.  Worse prior to admit.  Secondary to CHF.  BNP elevated.   LE US negative for DVT  RACHEL negative for arterial disease.   Continue diuretics, changed to torsemide by cardiologist.       COPD (chronic obstructive pulmonary disease) ([facility])- (present on admission)   Assessment & Plan    Wheezing noted on exam  Denies shortness of breath  Pro curt negative  Continue RT protocol, BT, and supplemental o2.      Type 2 diabetes mellitus with kidney complication, without long-term current use of insulin ([facility])- (present on admission)   Assessment & Plan    Uncontrolled  A1c 10.3  Hold metformin  Continue SSI while in the hospital   Diabetic diet and accuchecks.      Essential hypertension, benign- (present on admission)   Assessment & Plan    Blood pressure stable  Continue home medication               VTE prophylaxis: Heparin

## 2019-07-03 NOTE — PROGRESS NOTES
Bedside report received. Patient A&O x4. Complains of pain 3/10 in lower abdomen above groin. Medicated per MAR. POC discussed with patient. Patient verbalized understanding. Call light and belongings within reach. Bed locked and in lowest position, alarm and fall precautions in place.

## 2019-07-04 LAB
ANION GAP SERPL CALC-SCNC: 4 MMOL/L (ref 0–11.9)
BUN SERPL-MCNC: 32 MG/DL (ref 8–22)
CALCIUM SERPL-MCNC: 9 MG/DL (ref 8.5–10.5)
CHLORIDE SERPL-SCNC: 98 MMOL/L (ref 96–112)
CO2 SERPL-SCNC: 42 MMOL/L (ref 20–33)
CREAT SERPL-MCNC: 1.54 MG/DL (ref 0.5–1.4)
ERYTHROCYTE [DISTWIDTH] IN BLOOD BY AUTOMATED COUNT: 52.3 FL (ref 35.9–50)
GLUCOSE BLD-MCNC: 131 MG/DL (ref 65–99)
GLUCOSE BLD-MCNC: 167 MG/DL (ref 65–99)
GLUCOSE SERPL-MCNC: 157 MG/DL (ref 65–99)
HCT VFR BLD AUTO: 32.5 % (ref 42–52)
HGB BLD-MCNC: 10 G/DL (ref 14–18)
MCH RBC QN AUTO: 30.4 PG (ref 27–33)
MCHC RBC AUTO-ENTMCNC: 30.8 G/DL (ref 33.7–35.3)
MCV RBC AUTO: 98.8 FL (ref 81.4–97.8)
PLATELET # BLD AUTO: 170 K/UL (ref 164–446)
PMV BLD AUTO: 10.3 FL (ref 9–12.9)
POTASSIUM SERPL-SCNC: 3.5 MMOL/L (ref 3.6–5.5)
RBC # BLD AUTO: 3.29 M/UL (ref 4.7–6.1)
SODIUM SERPL-SCNC: 144 MMOL/L (ref 135–145)
WBC # BLD AUTO: 6 K/UL (ref 4.8–10.8)

## 2019-07-04 PROCEDURE — 80048 BASIC METABOLIC PNL TOTAL CA: CPT

## 2019-07-04 PROCEDURE — 770006 HCHG ROOM/CARE - MED/SURG/GYN SEMI*

## 2019-07-04 PROCEDURE — 97530 THERAPEUTIC ACTIVITIES: CPT

## 2019-07-04 PROCEDURE — 97116 GAIT TRAINING THERAPY: CPT

## 2019-07-04 PROCEDURE — 700105 HCHG RX REV CODE 258

## 2019-07-04 PROCEDURE — 700105 HCHG RX REV CODE 258: Performed by: INTERNAL MEDICINE

## 2019-07-04 PROCEDURE — 85027 COMPLETE CBC AUTOMATED: CPT

## 2019-07-04 PROCEDURE — 700102 HCHG RX REV CODE 250 W/ 637 OVERRIDE(OP): Performed by: INTERNAL MEDICINE

## 2019-07-04 PROCEDURE — 700111 HCHG RX REV CODE 636 W/ 250 OVERRIDE (IP): Performed by: INTERNAL MEDICINE

## 2019-07-04 PROCEDURE — 82962 GLUCOSE BLOOD TEST: CPT | Mod: 91

## 2019-07-04 PROCEDURE — A9270 NON-COVERED ITEM OR SERVICE: HCPCS | Performed by: INTERNAL MEDICINE

## 2019-07-04 PROCEDURE — 99231 SBSQ HOSP IP/OBS SF/LOW 25: CPT | Performed by: INTERNAL MEDICINE

## 2019-07-04 PROCEDURE — 97535 SELF CARE MNGMENT TRAINING: CPT

## 2019-07-04 RX ORDER — SODIUM CHLORIDE 9 MG/ML
INJECTION, SOLUTION INTRAVENOUS
Status: COMPLETED
Start: 2019-07-04 | End: 2019-07-04

## 2019-07-04 RX ADMIN — TORSEMIDE 100 MG: 100 TABLET ORAL at 06:12

## 2019-07-04 RX ADMIN — INSULIN HUMAN 2 UNITS: 100 INJECTION, SOLUTION PARENTERAL at 18:40

## 2019-07-04 RX ADMIN — AMPICILLIN SODIUM AND SULBACTAM SODIUM 3 G: 2; 1 INJECTION, POWDER, FOR SOLUTION INTRAMUSCULAR; INTRAVENOUS at 12:56

## 2019-07-04 RX ADMIN — HEPARIN SODIUM 5000 UNITS: 5000 INJECTION, SOLUTION INTRAVENOUS; SUBCUTANEOUS at 21:41

## 2019-07-04 RX ADMIN — CARVEDILOL 6.25 MG: 6.25 TABLET, FILM COATED ORAL at 07:09

## 2019-07-04 RX ADMIN — HEPARIN SODIUM 5000 UNITS: 5000 INJECTION, SOLUTION INTRAVENOUS; SUBCUTANEOUS at 06:12

## 2019-07-04 RX ADMIN — ATORVASTATIN CALCIUM 20 MG: 20 TABLET, FILM COATED ORAL at 06:12

## 2019-07-04 RX ADMIN — INSULIN HUMAN 2 UNITS: 100 INJECTION, SOLUTION PARENTERAL at 12:56

## 2019-07-04 RX ADMIN — AMPICILLIN SODIUM AND SULBACTAM SODIUM 3 G: 2; 1 INJECTION, POWDER, FOR SOLUTION INTRAMUSCULAR; INTRAVENOUS at 06:00

## 2019-07-04 RX ADMIN — LOSARTAN POTASSIUM 25 MG: 25 TABLET ORAL at 06:12

## 2019-07-04 RX ADMIN — SODIUM CHLORIDE: 9 INJECTION, SOLUTION INTRAVENOUS at 13:00

## 2019-07-04 RX ADMIN — AMPICILLIN SODIUM AND SULBACTAM SODIUM 3 G: 2; 1 INJECTION, POWDER, FOR SOLUTION INTRAMUSCULAR; INTRAVENOUS at 23:57

## 2019-07-04 RX ADMIN — FAMOTIDINE 20 MG: 20 TABLET ORAL at 06:12

## 2019-07-04 RX ADMIN — ASPIRIN 81 MG 81 MG: 81 TABLET ORAL at 06:12

## 2019-07-04 RX ADMIN — INSULIN GLARGINE 10 UNITS: 100 INJECTION, SOLUTION SUBCUTANEOUS at 18:40

## 2019-07-04 RX ADMIN — HEPARIN SODIUM 5000 UNITS: 5000 INJECTION, SOLUTION INTRAVENOUS; SUBCUTANEOUS at 14:45

## 2019-07-04 RX ADMIN — SENNOSIDES, DOCUSATE SODIUM 2 TABLET: 50; 8.6 TABLET, FILM COATED ORAL at 06:12

## 2019-07-04 RX ADMIN — AMPICILLIN SODIUM AND SULBACTAM SODIUM 3 G: 2; 1 INJECTION, POWDER, FOR SOLUTION INTRAMUSCULAR; INTRAVENOUS at 18:40

## 2019-07-04 ASSESSMENT — ENCOUNTER SYMPTOMS
HEARTBURN: 0
FALLS: 0
TINGLING: 0
FEVER: 0
NECK PAIN: 0
SHORTNESS OF BREATH: 0
HEADACHES: 0
DIZZINESS: 0
CHILLS: 0
MYALGIAS: 0
VOMITING: 0
EYE DISCHARGE: 0
PALPITATIONS: 0
DEPRESSION: 0
WEAKNESS: 0
PHOTOPHOBIA: 0
COUGH: 1
ABDOMINAL PAIN: 0
DIARRHEA: 0
BLURRED VISION: 0

## 2019-07-04 ASSESSMENT — GAIT ASSESSMENTS
DEVIATION: INCREASED BASE OF SUPPORT
GAIT LEVEL OF ASSIST: MINIMAL ASSIST
DISTANCE (FEET): 5
ASSISTIVE DEVICE: FRONT WHEEL WALKER

## 2019-07-04 ASSESSMENT — COGNITIVE AND FUNCTIONAL STATUS - GENERAL
DRESSING REGULAR LOWER BODY CLOTHING: A LOT
TOILETING: A LOT
STANDING UP FROM CHAIR USING ARMS: A LITTLE
MOVING TO AND FROM BED TO CHAIR: UNABLE
CLIMB 3 TO 5 STEPS WITH RAILING: A LOT
PERSONAL GROOMING: A LITTLE
HELP NEEDED FOR BATHING: A LOT
WALKING IN HOSPITAL ROOM: A LITTLE
SUGGESTED CMS G CODE MODIFIER DAILY ACTIVITY: CK
EATING MEALS: A LITTLE
MOVING FROM LYING ON BACK TO SITTING ON SIDE OF FLAT BED: UNABLE
TURNING FROM BACK TO SIDE WHILE IN FLAT BAD: UNABLE
DAILY ACTIVITIY SCORE: 15
MOBILITY SCORE: 11
DRESSING REGULAR UPPER BODY CLOTHING: A LITTLE
SUGGESTED CMS G CODE MODIFIER MOBILITY: CL

## 2019-07-04 ASSESSMENT — LIFESTYLE VARIABLES: SUBSTANCE_ABUSE: 0

## 2019-07-04 NOTE — THERAPY
"Occupational Therapy Treatment completed   Functional Status:  Pt seen for OT tx today, performed bed mobility with mod a, LB dressing max a for donning/doffing BLE's cam boot, seated h/g tasks with supervision utilizes compensatory strategies d/t baseline BUE's ROM limitations, min a for UB dressing, F balance sitting eob, STS eob with mod a, ambulated few steps forward and backward with min a using FWW; cues for body mechanics and FWW positioning, gave fair effort during session. Pt limited by pain, decreased balance, decreased strength and activity tolerance. Pt will benefit from acute skilled OT services while in house and post acute recommended prior to d/c home.   Plan of Care: Will benefit from Occupational Therapy 3 times per week  Discharge Recommendations:  Equipment Will Continue to Assess for Equipment Needs. Post-acute therapy Recommend post-acute placement for additional occupational therapy services prior to discharge home. Patient can tolerate post-acute therapies at a 5x/week frequency.      See \"Rehab Therapy-Acute\" Patient Summary Report for complete documentation.   "

## 2019-07-04 NOTE — THERAPY
"Physical Therapy Treatment completed.   Bed Mobility:  Supine to Sit: Moderate Assist  Transfers: Sit to Stand: Moderate Assist  Gait: Level Of Assist: Minimal Assist with Front-Wheel Walker       Plan of Care: Will benefit from Physical Therapy 3 times per week  Discharge Recommendations: Equipment: Will Continue to Assess for Equipment Needs. Post-acute therapy Discharge to a transitional care facility for continued skilled therapy services.     See \"Rehab Therapy-Acute\" Patient Summary Report for complete documentation.     Pt able to participate in ambulation today but is limited by pain and increased energy requirement due to CAM boots at [facility]. Pt continues to require PT in acute setting and recommend further therapy in a post acute setting as well as he is currently not safe to DC home at this physical level.   "

## 2019-07-04 NOTE — PROGRESS NOTES
[facility] Medicine Daily Progress Note    Date of Service  7/4/2019    Chief Complaint  79 y.o. male admitted 6/25/2019 with BLE swelling and diabetic foot infection.    Hospital Course    H/O COPD, CAD, HTN, DM, HLD.  Patient follows with outpatient wound care for chronic, nonhealing bilateral diabetic foot wounds.  Presented to ED after developing worsening erythema and swelling of BLE as well as purulent drainage from foot wounds.  Started on antibiotics.  ID and LPS consulted.  Also found to have mildly elevated troponin with stable EKG.  Suspect volume overload with elevated BNP and edema noted on chest x-ray.  Continue diuresis.  Echo revealing new CHF with EF 35%.  Cardiology consulted.  Pending surgical I&D.  Will need cardiac clearance prior to surgery.          Interval Problem Update  7/1.  Patient tolerated current diet but still showing signs of frequent cough.  Unclear if this is chronic cough although aspiration however patient definitely carried high risk of aspiration.  Previously long discussion regarding choice of oral intake and patient choose to eat with pleasure knowing the risk.  Patient's CODE STATUS already changed to DNR.  Patient still complains of lower extremity edema and tenderness and pain. Patient's pain is local 3-4/10, intermittent and does not radiate to other location, sharp and with some tingling. Can be controlled by pain meds. Dressing in place.  7/2: Pt seen and examined, resting in bed, complains of lower extremity pain, afebrile, family at bedside questions answered. Cont on IV abx as per ID rec.  shaun also order a PICC line as pt will need IV abx for a long period.   7/3: No overnight events , resting in bed, afebrile, no nausea or vomiting. Cont on IV abx, as per ID rec.   7/4: Afebrile, no overnight events, denies any CP, no nausea or vomiting. Cont on IV abx.  Pending placement   Consultants/Specialty  ID  LPS  Cardiology    Code Status  DNR    Disposition  SNF    Review  of Systems  Review of Systems   Constitutional: Negative for chills and fever.   HENT: Negative for ear pain and hearing loss.    Eyes: Negative for blurred vision, photophobia and discharge.   Respiratory: Positive for cough. Negative for shortness of breath.    Cardiovascular: Positive for leg swelling. Negative for chest pain and palpitations.   Gastrointestinal: Negative for abdominal pain, diarrhea, heartburn and vomiting.   Genitourinary: Negative for dysuria, frequency and hematuria.   Musculoskeletal: Negative for falls, myalgias and neck pain.   Skin: Negative for itching and rash.        BLE swelling and erythema.  Bilateral foot wounds.    Neurological: Negative for dizziness, tingling, weakness and headaches.   Psychiatric/Behavioral: Negative for depression and substance abuse.        Physical Exam  Temp:  [36.1 °C (97 °F)-36.8 °C (98.3 °F)] 36.3 °C (97.3 °F)  Pulse:  [56-80] 80  Resp:  [14-16] 16  BP: ()/(45-75) 119/75  SpO2:  [95 %-99 %] 99 %    Physical Exam   Constitutional: He is oriented to person, place, and time. He appears well-developed.   Obese male.    HENT:   Head: Normocephalic and atraumatic.   Eyes: Pupils are equal, round, and reactive to light. Conjunctivae and EOM are normal. Right eye exhibits no discharge. Left eye exhibits no discharge.   Neck: Normal range of motion. Neck supple. No JVD present. No thyromegaly present.   Cardiovascular: Normal rate, regular rhythm and normal heart sounds.  Exam reveals no friction rub.    BLE edema   Pulmonary/Chest: Effort normal. No respiratory distress. He has decreased breath sounds in the right lower field and the left lower field. He has wheezes in the right lower field, the left upper field, the left middle field and the left lower field. He exhibits no tenderness.   Patient's cough is weak and patient showing signs of aspiration   Abdominal: Soft. Bowel sounds are normal. He exhibits no distension. There is no tenderness.    Musculoskeletal: Normal range of motion. He exhibits edema and tenderness. He exhibits no deformity.   Neurological: He is alert and oriented to person, place, and time. He displays normal reflexes.   Skin: Skin is warm and dry. No rash noted. There is erythema.   Stasis dermatitis noted to BLE   Bilateral diabetic foot wounds with purulent drainage.  Dressing intact.    Psychiatric: He has a normal mood and affect.   Nursing note and vitals reviewed.      Fluids    Intake/Output Summary (Last 24 hours) at 07/04/19 1259  Last data filed at 07/03/19 1900   Gross per 24 hour   Intake                0 ml   Output             1200 ml   Net            -1200 ml       Laboratory  Recent Labs      07/03/19   0605  07/04/19   0420   WBC  6.0  6.0   RBC  3.39*  3.29*   HEMOGLOBIN  9.8*  10.0*   HEMATOCRIT  33.9*  32.5*   MCV  100.0*  98.8*   MCH  28.9  30.4   MCHC  28.9*  30.8*   RDW  52.8*  52.3*   PLATELETCT  179  170   MPV  10.4  10.3     Recent Labs      07/03/19   0605  07/04/19   0420   SODIUM  146*  144   POTASSIUM  3.7  3.5*   CHLORIDE  101  98   CO2  38*  42*   GLUCOSE  160*  157*   BUN  33*  32*   CREATININE  1.58*  1.54*   CALCIUM  8.8  9.0                   Imaging  IR-PICC LINE PLACEMENT W/ GUIDANCE > AGE 5   Final Result                  Ultrasound-guided PICC placement performed by qualified nursing staff as    above.          US-RACHEL SINGLE LEVEL BILAT   Final Result      EC-ECHOCARDIOGRAM COMPLETE W/ CONT   Final Result      US-EXTREMITY VENOUS LOWER BILAT   Final Result      DX-SHOULDER 2+ RIGHT   Final Result      1.  No acute findings.      2.  Severe degenerative change in the right acromioclavicular joint.      DX-FOOT-COMPLETE 3+ RIGHT   Final Result      1.  Lucency in the right fifth toe, possibly representing osteomyelitis. If clinically indicated, MRI could be performed for confirmation.      2.  Soft tissue edema within the forefoot.      DX-FOOT-COMPLETE 3+ LEFT   Final Result      1.  No  definitive evidence for osteomyelitis.      2.  Forefoot deformities as described.      DX-CHEST-PORTABLE (1 VIEW)   Final Result      Bilateral interstitial opacities may represent interstitial edema or pneumonitis.      Mild cardiomegaly.      Atherosclerotic plaque.              Assessment/Plan    Acute systolic CHF (congestive heart failure) (HCC)   Assessment & Plan    Echo reveals EF of 35%  New diagnosis  Known CAD with multivessel disease  Mild volume overload undergoing diuresis  Seen by cardiology  Continue coreg   Continue diuretics, changed to torsemide by cardiologist.       Diabetic foot infection (HCC)   Assessment & Plan    History of chronic diabetic wounds to bilateral feet.  Has been following with outpatient wound clinic  Recent worsening of wounds with purulent drainage.   He found to have possible right foot osteomyelitis on x-ray.  S/p I&D by ortho   ID following.  Now on Unasyn as per ID rec, cefepime and zyvox d/c   LPS following.       Oropharyngeal dysphagia- (present on admission)   Assessment & Plan    -Patient has very weak cough during the day however patient said this is his baseline.  -Noticed significant risk of aspiration evaluated by bedside nurse, me, and speech therapist.  -Per speech therapist recommend patient to have fees test however patient refused.  -Further discussion about the risk of aspiration patient would like to take the risk and eat for pleasure.  -Patient also wants to change CODE STATUS to DNR.  -Patient currently is competent to make medical decision.  We will respect patient wishes and start patient on safest diet currently as dysphagia II with nectar thick.     Continue modified diet to reduce relatively risk of aspiration     DNR (do not resuscitate)   Assessment & Plan    Patient is competent to make medical decision.  CODE STATUS remain on DNR     Venous stasis dermatitis of both lower extremities   Assessment & Plan    Secondary to chronic edema  Continue  diuretics, changed to torsemide by cardiologist.  Skin care.      Macrocytosis   Assessment & Plan    TSH, vitamin B12 and folic acid wnl.     Elevated troponin   Assessment & Plan    He found to have mildly elevated troponin.  He denies any symptoms of chest pain.  EKG stable  ? Stress induced secondary to volume overload  Echo shows EF 35%.  Continue diuretics, changed to torsemide by cardiologist.  Appreciate rec.        CKD (chronic kidney disease), stage III ([facility])- (present on admission)   Assessment & Plan    History of chronic kidney disease.  Currently renal functions are at the baseline.  Avoid nephrotoxins.  Continue to monitor  Kidney function remained stable.     CAD (coronary artery disease)- (present on admission)   Assessment & Plan    Hx of  Continue lipitor and ASA.      Mixed hyperlipidemia- (present on admission)   Assessment & Plan    Continue lipitor.      Edema- (present on admission)   Assessment & Plan    Hx of chronic BLE edema.  Worse prior to admit.  Secondary to CHF.  BNP elevated.   LE US negative for DVT  RACHEL negative for arterial disease.   Continue diuretics, changed to torsemide by cardiologist.       COPD (chronic obstructive pulmonary disease) ([facility])- (present on admission)   Assessment & Plan    Wheezing noted on exam  Denies shortness of breath  Pro curt negative  Continue RT protocol, BT, and supplemental o2.      Type 2 diabetes mellitus with kidney complication, without long-term current use of insulin ([facility])- (present on admission)   Assessment & Plan    Uncontrolled  A1c 10.3  Hold metformin  Continue SSI while in the hospital   Diabetic diet and accuchecks.      Essential hypertension, benign- (present on admission)   Assessment & Plan    Blood pressure stable  Continue home medication               VTE prophylaxis: Heparin

## 2019-07-04 NOTE — DISCHARGE PLANNING
Anticipated Discharge Disposition:   [facility]    Action:   Called Eloina at [facility] [phone].  Patient will receive an onsite visit from representative from SNF today between 12: today.    Barriers to Discharge:   Pending acceptance to [facility]    Plan:   Follow up with [facility] with [facility] after patient seen by rep today.

## 2019-07-04 NOTE — CARE PLAN
Problem: Communication  Goal: The ability to communicate needs accurately and effectively will improve  Outcome: PROGRESSING AS EXPECTED      Problem: Infection  Goal: Will remain free from infection  Outcome: PROGRESSING AS EXPECTED      Problem: Venous Thromboembolism (VTW)/Deep Vein Thrombosis (DVT) Prevention:  Goal: Patient will participate in Venous Thrombosis (VTE)/Deep Vein Thrombosis (DVT)Prevention Measures  Outcome: PROGRESSING SLOWER THAN EXPECTED

## 2019-07-05 PROBLEM — R53.1 GENERALIZED WEAKNESS: Status: ACTIVE | Noted: 2019-07-05

## 2019-07-05 LAB
ANION GAP SERPL CALC-SCNC: 6 MMOL/L (ref 0–11.9)
BUN SERPL-MCNC: 33 MG/DL (ref 8–22)
CALCIUM SERPL-MCNC: 8.6 MG/DL (ref 8.5–10.5)
CHLORIDE SERPL-SCNC: 97 MMOL/L (ref 96–112)
CO2 SERPL-SCNC: 43 MMOL/L (ref 20–33)
CREAT SERPL-MCNC: 1.41 MG/DL (ref 0.5–1.4)
ERYTHROCYTE [DISTWIDTH] IN BLOOD BY AUTOMATED COUNT: 52.6 FL (ref 35.9–50)
GLUCOSE BLD-MCNC: 113 MG/DL (ref 65–99)
GLUCOSE BLD-MCNC: 160 MG/DL (ref 65–99)
GLUCOSE BLD-MCNC: 77 MG/DL (ref 65–99)
GLUCOSE SERPL-MCNC: 83 MG/DL (ref 65–99)
HCT VFR BLD AUTO: 31.2 % (ref 42–52)
HGB BLD-MCNC: 9.2 G/DL (ref 14–18)
MCH RBC QN AUTO: 29.2 PG (ref 27–33)
MCHC RBC AUTO-ENTMCNC: 29.5 G/DL (ref 33.7–35.3)
MCV RBC AUTO: 99 FL (ref 81.4–97.8)
PLATELET # BLD AUTO: 170 K/UL (ref 164–446)
PMV BLD AUTO: 10.3 FL (ref 9–12.9)
POTASSIUM SERPL-SCNC: 3.1 MMOL/L (ref 3.6–5.5)
RBC # BLD AUTO: 3.15 M/UL (ref 4.7–6.1)
SODIUM SERPL-SCNC: 146 MMOL/L (ref 135–145)
WBC # BLD AUTO: 7.5 K/UL (ref 4.8–10.8)

## 2019-07-05 PROCEDURE — A9270 NON-COVERED ITEM OR SERVICE: HCPCS | Performed by: FAMILY MEDICINE

## 2019-07-05 PROCEDURE — 700102 HCHG RX REV CODE 250 W/ 637 OVERRIDE(OP): Performed by: INTERNAL MEDICINE

## 2019-07-05 PROCEDURE — 700111 HCHG RX REV CODE 636 W/ 250 OVERRIDE (IP): Performed by: INTERNAL MEDICINE

## 2019-07-05 PROCEDURE — A9270 NON-COVERED ITEM OR SERVICE: HCPCS | Performed by: INTERNAL MEDICINE

## 2019-07-05 PROCEDURE — A9270 NON-COVERED ITEM OR SERVICE: HCPCS | Performed by: NURSE PRACTITIONER

## 2019-07-05 PROCEDURE — 99233 SBSQ HOSP IP/OBS HIGH 50: CPT | Performed by: INTERNAL MEDICINE

## 2019-07-05 PROCEDURE — 99232 SBSQ HOSP IP/OBS MODERATE 35: CPT | Performed by: INTERNAL MEDICINE

## 2019-07-05 PROCEDURE — 85027 COMPLETE CBC AUTOMATED: CPT

## 2019-07-05 PROCEDURE — 770006 HCHG ROOM/CARE - MED/SURG/GYN SEMI*

## 2019-07-05 PROCEDURE — 700102 HCHG RX REV CODE 250 W/ 637 OVERRIDE(OP): Performed by: NURSE PRACTITIONER

## 2019-07-05 PROCEDURE — 82962 GLUCOSE BLOOD TEST: CPT | Mod: 91

## 2019-07-05 PROCEDURE — 700102 HCHG RX REV CODE 250 W/ 637 OVERRIDE(OP): Performed by: FAMILY MEDICINE

## 2019-07-05 PROCEDURE — 700105 HCHG RX REV CODE 258: Performed by: INTERNAL MEDICINE

## 2019-07-05 PROCEDURE — 80048 BASIC METABOLIC PNL TOTAL CA: CPT

## 2019-07-05 RX ORDER — POTASSIUM CHLORIDE 20 MEQ/1
40 TABLET, EXTENDED RELEASE ORAL ONCE
Status: COMPLETED | OUTPATIENT
Start: 2019-07-05 | End: 2019-07-05

## 2019-07-05 RX ORDER — POTASSIUM CHLORIDE 20 MEQ/1
20 TABLET, EXTENDED RELEASE ORAL ONCE
Status: COMPLETED | OUTPATIENT
Start: 2019-07-05 | End: 2019-07-05

## 2019-07-05 RX ADMIN — FAMOTIDINE 20 MG: 20 TABLET ORAL at 05:14

## 2019-07-05 RX ADMIN — AMPICILLIN SODIUM AND SULBACTAM SODIUM 3 G: 2; 1 INJECTION, POWDER, FOR SOLUTION INTRAMUSCULAR; INTRAVENOUS at 12:26

## 2019-07-05 RX ADMIN — INSULIN GLARGINE 10 UNITS: 100 INJECTION, SOLUTION SUBCUTANEOUS at 18:33

## 2019-07-05 RX ADMIN — POTASSIUM CHLORIDE 40 MEQ: 20 TABLET, EXTENDED RELEASE ORAL at 09:33

## 2019-07-05 RX ADMIN — LOSARTAN POTASSIUM 25 MG: 25 TABLET ORAL at 08:25

## 2019-07-05 RX ADMIN — TORSEMIDE 100 MG: 100 TABLET ORAL at 09:33

## 2019-07-05 RX ADMIN — AMPICILLIN SODIUM AND SULBACTAM SODIUM 3 G: 2; 1 INJECTION, POWDER, FOR SOLUTION INTRAMUSCULAR; INTRAVENOUS at 05:14

## 2019-07-05 RX ADMIN — HEPARIN SODIUM 5000 UNITS: 5000 INJECTION, SOLUTION INTRAVENOUS; SUBCUTANEOUS at 05:14

## 2019-07-05 RX ADMIN — ASPIRIN 81 MG 81 MG: 81 TABLET ORAL at 05:15

## 2019-07-05 RX ADMIN — ATORVASTATIN CALCIUM 20 MG: 20 TABLET, FILM COATED ORAL at 05:14

## 2019-07-05 RX ADMIN — SENNOSIDES, DOCUSATE SODIUM 2 TABLET: 50; 8.6 TABLET, FILM COATED ORAL at 18:30

## 2019-07-05 RX ADMIN — POTASSIUM CHLORIDE 20 MEQ: 1500 TABLET, EXTENDED RELEASE ORAL at 03:45

## 2019-07-05 RX ADMIN — HEPARIN SODIUM 5000 UNITS: 5000 INJECTION, SOLUTION INTRAVENOUS; SUBCUTANEOUS at 20:51

## 2019-07-05 RX ADMIN — HEPARIN SODIUM 5000 UNITS: 5000 INJECTION, SOLUTION INTRAVENOUS; SUBCUTANEOUS at 14:14

## 2019-07-05 RX ADMIN — AMPICILLIN SODIUM AND SULBACTAM SODIUM 3 G: 2; 1 INJECTION, POWDER, FOR SOLUTION INTRAMUSCULAR; INTRAVENOUS at 18:31

## 2019-07-05 ASSESSMENT — ENCOUNTER SYMPTOMS
FALLS: 0
NERVOUS/ANXIOUS: 0
FOCAL WEAKNESS: 0
DIZZINESS: 0
WEAKNESS: 1
SENSORY CHANGE: 0
FEVER: 0
TREMORS: 0
VOMITING: 0
COUGH: 0
BACK PAIN: 0
DIAPHORESIS: 0
BLURRED VISION: 0
DIARRHEA: 0
SHORTNESS OF BREATH: 0
TINGLING: 0
DOUBLE VISION: 0
NAUSEA: 0
CONSTIPATION: 0
ABDOMINAL PAIN: 0
SPEECH CHANGE: 0
HEARTBURN: 0
HEADACHES: 0
DEPRESSION: 0
MYALGIAS: 0
CHILLS: 0

## 2019-07-05 ASSESSMENT — LIFESTYLE VARIABLES: SUBSTANCE_ABUSE: 0

## 2019-07-05 NOTE — DISCHARGE PLANNING
Anticipated Discharge Disposition: LTAC    Action: Spoke to patient and his spouse about d/c planning.  It appears patient has been to the [facility] and would like to go back, not to a skilled.    JOSSUE faxed [facility] to [facility].     Barriers to Discharge: acceptance    Plan: follow up with [facility] for acceptance

## 2019-07-05 NOTE — CARE PLAN
Problem: Safety  Goal: Will remain free from injury  Outcome: PROGRESSING AS EXPECTED  Fall precautions in place. IV pole on same side as bathroom. Bedrails up. Bed in lowest position and locked.  Call light and phone within reach. Patient educated on importance of calling nurses before getting out of bed, verbalizes understanding. Bed alarm on.    Problem: Venous Thromboembolism (VTW)/Deep Vein Thrombosis (DVT) Prevention:  Goal: Patient will participate in Venous Thrombosis (VTE)/Deep Vein Thrombosis (DVT)Prevention Measures  Outcome: PROGRESSING AS EXPECTED   07/04/19 211   OTHER   Risk Assessment Score 3   VTE RISK High   Pharmacologic Prophylaxis Used Unfractionated Heparin

## 2019-07-05 NOTE — PROGRESS NOTES
Updated Dr. Murphy with a critical lab result for CO2=43 and also K=3.1. No interventions needed at this time for the CO2 result, but received orders for a x1 dose of 20 mEq PO potassium chloride for replacement.

## 2019-07-05 NOTE — PROGRESS NOTES
[facility] Medicine Daily Progress Note    Date of Service  7/5/2019    Chief Complaint  79 y.o. male admitted 6/25/2019 with BLE swelling and diabetic foot infection.    Hospital Course    H/O COPD, CAD, HTN, DM, HLD.  Patient follows with outpatient wound care for chronic, nonhealing bilateral diabetic foot wounds.  Presented to ED after developing worsening erythema and swelling of BLE as well as purulent drainage from foot wounds.  Started on antibiotics.  ID and LPS consulted.  S/P metatarsal head resection of right 5th and left 2nd and 3rd on 6/28. OR cx positive for E. Faecalis.  Will need 6 weeks of IV antibiotics.  Also found to have a new diagnosis of CHF with EF of 35% on echo.  Mild volume overload on admission with BLE edema, improved with diuresis.  Evaluated by cardiology.  Heart failure suspected to be secondary to ischemic cardiomyopathy with known severe CAD with multivessel disease.  Poor surgical candidate secondary to comorbidities.  Continue medical management.  He is also noted to have significant dysphagia, although he refuses FEES for aspiration evaluation.  Patient wishes to eat despite aspiration risk.  Physician had extensive discussion of overall prognosis with family and patient.  Decision was made to transition to DNR status.          Interval Problem Update  7/5:  PICC in place.  Denies pain.  Anxious to discharge to skilled.  BLE edema improving.  Bilateral offloading boots in place.  BP is soft.  Denies lightheadedness, dizziness, or shortness of breath.  BG well controlled.       Consultants/Specialty  ID  LPS  Cardiology    Code Status  DNR    Disposition  Pending SNF vs LTACH.  CM following.     Review of Systems  Review of Systems   Constitutional: Negative for chills, fever and malaise/fatigue.   HENT: Negative for congestion.    Eyes: Negative for blurred vision and double vision.   Respiratory: Negative for cough and shortness of breath.    Cardiovascular: Positive for leg  swelling. Negative for chest pain.   Gastrointestinal: Negative for abdominal pain, diarrhea, heartburn, nausea and vomiting.   Genitourinary: Negative for dysuria.   Musculoskeletal: Negative for falls and myalgias.   Skin: Negative for rash.   Neurological: Positive for weakness. Negative for dizziness, tingling, tremors, sensory change, speech change, focal weakness and headaches.   Psychiatric/Behavioral: Negative for depression and substance abuse.        Physical Exam  Temp:  [36.6 °C (97.8 °F)-37.1 °C (98.7 °F)] 36.7 °C (98 °F)  Pulse:  [74-95] 74  Resp:  [17-19] 19  BP: ()/(55-68) 102/55  SpO2:  [90 %-93 %] 93 %    Physical Exam   Constitutional: He is oriented to person, place, and time. He appears well-developed and well-nourished. No distress.   Obese male.    HENT:   Head: Normocephalic and atraumatic.   Right Ear: External ear normal.   Left Ear: External ear normal.   Mouth/Throat: No oropharyngeal exudate.   Eyes: Conjunctivae are normal. No scleral icterus.   Neck: Normal range of motion. No JVD present.   Cardiovascular: Normal rate, regular rhythm and normal heart sounds.  Exam reveals no friction rub.    No murmur heard.  BLE edema   Pulmonary/Chest: Effort normal. No stridor. No respiratory distress. He has decreased breath sounds in the right lower field and the left lower field. He exhibits no tenderness.   Abdominal: Soft. Bowel sounds are normal. He exhibits no distension. There is no tenderness.   Musculoskeletal: He exhibits edema and tenderness.   Generalized weakness     Neurological: He is alert and oriented to person, place, and time. No cranial nerve deficit.   Skin: Skin is warm and dry. No rash noted. He is not diaphoretic.   Stasis dermatitis noted to BLE   Surgical wounds to bilateral feet with dressings intact    Psychiatric: He has a normal mood and affect.   Nursing note and vitals reviewed.      Fluids    Intake/Output Summary (Last 24 hours) at 07/05/19 1317  Last data  filed at 07/05/19 0900   Gross per 24 hour   Intake              420 ml   Output              800 ml   Net             -380 ml       Laboratory  Recent Labs      07/03/19   0605 07/04/19   0420 07/05/19   0232   WBC  6.0  6.0  7.5   RBC  3.39*  3.29*  3.15*   HEMOGLOBIN  9.8*  10.0*  9.2*   HEMATOCRIT  33.9*  32.5*  31.2*   MCV  100.0*  98.8*  99.0*   MCH  28.9  30.4  29.2   MCHC  28.9*  30.8*  29.5*   RDW  52.8*  52.3*  52.6*   PLATELETCT  179  170  170   MPV  10.4  10.3  10.3     Recent Labs      07/03/19 0605 07/04/19 0420 07/05/19   0232   SODIUM  146*  144  146*   POTASSIUM  3.7  3.5*  3.1*   CHLORIDE  101  98  97   CO2  38*  42*  43*   GLUCOSE  160*  157*  83   BUN  33*  32*  33*   CREATININE  1.58*  1.54*  1.41*   CALCIUM  8.8  9.0  8.6                   Imaging  IR-PICC LINE PLACEMENT W/ GUIDANCE > AGE 5   Final Result                  Ultrasound-guided PICC placement performed by qualified nursing staff as    above.          US-RACHEL SINGLE LEVEL BILAT   Final Result      EC-ECHOCARDIOGRAM COMPLETE W/ CONT   Final Result      US-EXTREMITY VENOUS LOWER BILAT   Final Result      DX-SHOULDER 2+ RIGHT   Final Result      1.  No acute findings.      2.  Severe degenerative change in the right acromioclavicular joint.      DX-FOOT-COMPLETE 3+ RIGHT   Final Result      1.  Lucency in the right fifth toe, possibly representing osteomyelitis. If clinically indicated, MRI could be performed for confirmation.      2.  Soft tissue edema within the forefoot.      DX-FOOT-COMPLETE 3+ LEFT   Final Result      1.  No definitive evidence for osteomyelitis.      2.  Forefoot deformities as described.      DX-CHEST-PORTABLE (1 VIEW)   Final Result      Bilateral interstitial opacities may represent interstitial edema or pneumonitis.      Mild cardiomegaly.      Atherosclerotic plaque.              Assessment/Plan    Acute systolic CHF (congestive heart failure) (HCC)   Assessment & Plan    Echo reveals EF of 35%  New  diagnosis  Secondary to ischemic cardiomyopathy with known severe CAD with multivessel disease.  Was deemed a poor surgical candidate.  Further invasive cardiac procedure or surgery would not change the overall mortality of this patient per cardiology    Optimize medical management.  Continue coreg, losartan, and torsemide.  Hold off on aldactone for now as BP is soft.        Diabetic foot infection (HCC)   Assessment & Plan    History of chronic diabetic wounds to bilateral feet.  Has been following with outpatient wound clinic  Recent worsening of wounds with purulent drainage.   He found to have possible right foot osteomyelitis on x-ray.  S/p metatarsal head resection of right 5th and left 2nd and 3rd.    OR culture of right fifth metatarsal +E faecalis  ID following.  Will need 6 weeks IV antibiotics   Continue Unasyn with a stop date 8/9/2019  LPS following.  Continue tight glycemic control.       Generalized weakness   Assessment & Plan    PT/OT following.  Pending SNF vs LTACH     Oropharyngeal dysphagia- (present on admission)   Assessment & Plan    -Patient has very weak cough during the day however patient said this is his baseline.  -Noticed significant risk of aspiration evaluated by bedside nurse, me, and speech therapist.  -Per speech therapist recommend patient to have fees test however patient refused.  -Further discussion about the risk of aspiration patient would like to take the risk and eat for pleasure.  -Patient also wants to change CODE STATUS to DNR.  -Patient currently is competent to make medical decision.  We will respect patient wishes and start patient on safest diet currently as dysphagia II with nectar thick.     Continue modified diet to reduce relatively risk of aspiration     DNR (do not resuscitate)   Assessment & Plan    Patient is competent to make medical decision.  CODE STATUS remain on DNR     Cardiomyopathy (HCC)   Assessment & Plan    Ischemic cardiomyopathy secondary to  severe CAD.  Not a candidate for surgical intervention per cardiology.  Continue aggressive medical management as above.      Venous stasis dermatitis of both lower extremities   Assessment & Plan    Secondary to chronic edema  Continue diuretics, changed to torsemide by cardiologist.  Skin care.      Macrocytosis   Assessment & Plan    TSH, vitamin B12 and folic acid wnl.     Elevated troponin   Assessment & Plan    He found to have mildly elevated troponin.  He denies any symptoms of chest pain.  EKG stable  Stress induced secondary to volume overload  Echo shows EF 35%.  Continue diuretics, changed to torsemide by cardiologist.  Appreciate rec.        CKD (chronic kidney disease), stage III ([facility])- (present on admission)   Assessment & Plan    History of chronic kidney disease.  Currently renal functions are at the baseline.  Avoid nephrotoxins.  Continue to monitor  Kidney function remained stable.     CAD (coronary artery disease)- (present on admission)   Assessment & Plan    Hx of  Continue lipitor and ASA.      Mixed hyperlipidemia- (present on admission)   Assessment & Plan    Continue lipitor.      Edema- (present on admission)   Assessment & Plan    Chronic BLE edema.  Secondary to CHF.  Overall improved.   Continue torsemide.        COPD (chronic obstructive pulmonary disease) ([facility])- (present on admission)   Assessment & Plan    Not in acute exacerbation.  Continue RT protocol, BT, and supplemental o2.      Type 2 diabetes mellitus with kidney complication, without long-term current use of insulin ([facility])- (present on admission)   Assessment & Plan    A1c 10.3\  BG better controlled on current regimen  Continue lantus and SSI.  Diabetic diet and accuchecks.      Essential hypertension, benign- (present on admission)   Assessment & Plan    Blood pressures are soft.  Continue losartan, coreg, and torsemide.                VTE prophylaxis: Heparin

## 2019-07-05 NOTE — PROGRESS NOTES
Infectious Disease Progress Note    Author: Teresa Moreau M.D. Date & Time of service: 2019  11:14 AM       Chief Complaint:  Bilateral diabetic foot wounds/osteomyelitis     Interval History:  79-year-old male admitted on 2019 due to worsening bilateral foot wounds and worsening CHF.      -AF, WBC 5.5, no issue with antibiotics, denies pain, denies being short of breath.   Afebrile, white count 7000.  Patient tolerating antibiotics, no new issues.  -AF, no WBC, no issue with antibiotics, denies pain stating he feels just fine, complaining that he has not been able to get up and walk around.  7/3-AF, WBC 6.0, tolerating antibiotics, denies pain, agreeable to SNF placement.   afebrile WBC 7.5 patient transferred to see her tower overnight.  He denies any bilateral lower extremity pain.  No shortness of breath or cough.     Labs, medications and wounds reviewed.      Review of Systems:  Review of Systems   Constitutional: Negative for chills, diaphoresis, fever and malaise/fatigue.   Respiratory: Negative for cough and shortness of breath.    Cardiovascular: Positive for leg swelling. Negative for chest pain.   Gastrointestinal: Negative for abdominal pain, constipation, diarrhea, nausea and vomiting.   Genitourinary: Negative for dysuria.   Musculoskeletal: Negative for back pain, joint pain and myalgias.   Skin: Negative for itching and rash.   Neurological: Negative for sensory change and headaches.   Psychiatric/Behavioral: The patient is not nervous/anxious.        Hemodynamics:  Temp (24hrs), Av.8 °C (98.3 °F), Min:36.6 °C (97.8 °F), Max:37.1 °C (98.7 °F)  Temperature: 36.7 °C (98 °F)  Pulse  Av.9  Min: 56  Max: 105   Blood Pressure : 102/55       Physical Exam:  Physical Exam   Constitutional: He is oriented to person, place, and time. He appears well-developed and well-nourished.   Elderly  Disheveled  Obese   HENT:   Head: Normocephalic and atraumatic.   Mouth/Throat:  Oropharynx is clear and moist. No oropharyngeal exudate.   Poor dentition-edentulous   Eyes: Pupils are equal, round, and reactive to light. Conjunctivae and EOM are normal.   Neck: Normal range of motion. Neck supple. No JVD present.   Cardiovascular: Normal rate, regular rhythm and normal heart sounds.  Exam reveals no gallop and no friction rub.    Faint distal pulses to BLE   Pulmonary/Chest: Effort normal. No respiratory distress. He has no rales.   Abdominal: Soft. Bowel sounds are normal. He exhibits no distension. There is no tenderness.   Genitourinary:   Genitourinary Comments: Condom cath in place    Musculoskeletal: He exhibits edema (+1 BLE) and deformity. He exhibits no tenderness.   Bilateral Achilles-sutures in place, no active drainage, no erythema, nontender to palpation.  Left second/third and right fifth MTH incisions-sutures in place, no active drainage, trace erythema along incisions.  Tenotomy sites with sutures in place, no active drainage, no erythema.  Right plantar ulceration-wound bed pink with packing in place to small section, scant serous drainage, no odor, no erythema or maceration to tissue, nontender to palpation.  Left plantar ulceration-wound bed packed with silver dressing, no erythema or maceration to surrounding tissue, nontender to palpation.    RUE PICC- CDI, non tender, no erythema.   Neurological: He is alert and oriented to person, place, and time. No cranial nerve deficit. Coordination normal.   Skin: Skin is warm and dry. No rash noted. He is not diaphoretic. There is erythema.   Psychiatric: He has a normal mood and affect. His behavior is normal.   Nursing note and vitals reviewed.      Meds:    Current Facility-Administered Medications:   •  ampicillin-sulbactam (UNASYN) IV  •  insulin glargine  •  carvedilol  •  torsemide  •  losartan  •  insulin regular **AND** Accu-Chek ACHS **AND** NOTIFY MD and PharmD **AND** glucose **AND** dextrose 10% bolus  •  Respiratory  Care per Protocol  •  senna-docusate **AND** polyethylene glycol/lytes **AND** magnesium hydroxide **AND** bisacodyl  •  heparin  •  acetaminophen  •  albuterol  •  aspirin  •  atorvastatin  •  famotidine    Labs:  Recent Labs      07/03/19 0605 07/04/19 0420 07/05/19   0232   WBC  6.0  6.0  7.5   RBC  3.39*  3.29*  3.15*   HEMOGLOBIN  9.8*  10.0*  9.2*   HEMATOCRIT  33.9*  32.5*  31.2*   MCV  100.0*  98.8*  99.0*   MCH  28.9  30.4  29.2   RDW  52.8*  52.3*  52.6*   PLATELETCT  179  170  170   MPV  10.4  10.3  10.3     Recent Labs      07/03/19 0605 07/04/19 0420 07/05/19   0232   SODIUM  146*  144  146*   POTASSIUM  3.7  3.5*  3.1*   CHLORIDE  101  98  97   CO2  38*  42*  43*   GLUCOSE  160*  157*  83   BUN  33*  32*  33*     Recent Labs      07/03/19 0605 07/04/19 0420 07/05/19   0232   CREATININE  1.58*  1.54*  1.41*       Imaging:  No new imaging within the last 24 hours.    Micro:  Results     Procedure Component Value Units Date/Time    CULTURE TISSUE W/ GRM STAIN [[id number]] Collected:  06/28/19 1647    Order Status:  Completed Specimen:  Tissue Updated:  07/03/19 1348     Significant Indicator NEG     Source TISS     Site 2nd and 3rd Left Metatarsals     Culture Result No growth at 72 hours.     Gram Stain Result No organisms seen.    Narrative:       Surgery Specimen    AFB Culture [[id number]] Collected:  06/28/19 1647    Order Status:  Completed Specimen:  Tissue Updated:  07/03/19 1348     Significant Indicator NEG     Source TISS     Site 2nd and 3rd Left Metatarsals     Culture Result Culture in progress.     AFB Smear Results No acid fast bacilli seen.    Narrative:       Surgery Specimen    Anaerobic Culture [[id number]] Collected:  06/28/19 1647    Order Status:  Completed Specimen:  Tissue Updated:  07/03/19 1348     Significant Indicator NEG     Source TISS     Site 2nd and 3rd Left Metatarsals     Culture Result No Anaerobes isolated.    Narrative:       Surgery Specimen    Fungal  Culture [[id number]] Collected:  06/28/19 1647    Order Status:  Completed Specimen:  Tissue Updated:  07/03/19 1348     Significant Indicator NEG     Source TISS     Site 2nd and 3rd Left Metatarsals     Culture Result No fungal growth to date.    Narrative:       Surgery Specimen    AFB Culture [[id number]] Collected:  06/28/19 1658    Order Status:  Completed Specimen:  Tissue Updated:  07/02/19 1004     Significant Indicator NEG     Source TISS     Site 5th Right Metatarsal     Culture Result Culture in progress.     AFB Smear Results No acid fast bacilli seen.    Narrative:       Surgery Specimen    Fungal Culture [[id number]] Collected:  06/28/19 1658    Order Status:  Completed Specimen:  Tissue Updated:  07/02/19 1004     Significant Indicator NEG     Source TISS     Site 5th Right Metatarsal     Culture Result No fungal growth to date.    Narrative:       Surgery Specimen    Anaerobic Culture [[id number]] Collected:  06/28/19 1658    Order Status:  Completed Specimen:  Tissue Updated:  07/02/19 1004     Significant Indicator NEG     Source TISS     Site 5th Right Metatarsal     Culture Result No Anaerobes isolated.    Narrative:       Surgery Specimen    CULTURE TISSUE W/ GRM STAIN [[id number]]  (Abnormal)  (Susceptibility) Collected:  06/28/19 1658    Order Status:  Completed Specimen:  Tissue Updated:  07/02/19 1004     Significant Indicator POS (POS)     Source TISS     Site 5th Right Metatarsal     Culture Result Growth noted after further incubation, see below for  organism identification.   (A)     Gram Stain Result No organisms seen.     Culture Result Enterococcus faecalis  Rare growth  Combination therapy with ampicillin, penicillin, or  vancomycin (for susceptible strains) plus an aminoglycoside  is usually indicated for serious enterococcal infections,  such as endocarditis unless high-level resistance to both  gentamicin and streptomycin is documented; such combinations  are predicted to result in  "synergistic killing of the  Enterococcus.   (A)    Narrative:       Surgery Specimen    Culture & Susceptibility     ENTEROCOCCUS FAECALIS     Antibiotic Sensitivity Microscan Unit Status    Ampicillin Sensitive <=2 mcg/mL Final    Method: PETTY    Daptomycin Sensitive 1 mcg/mL Final    Method: PETTY    Gent Synergy Sensitive <=500 mcg/mL Final    Method: PETTY    Penicillin Sensitive 2 mcg/mL Final    Method: PETTY    Vancomycin Sensitive 2 mcg/mL Final    Method: PETTY                       BLOOD CULTURE [[id number]] Collected:  06/25/19 1237    Order Status:  Completed Specimen:  Blood from Peripheral Updated:  06/30/19 1500     Significant Indicator NEG     Source BLD     Site PERIPHERAL     Culture Result No growth after 5 days of incubation.    Narrative:       Per Hospital Policy: Only change Specimen Src: to \"Line\" if  specified by physician order.  No site indicated    Blood Culture [[id number]] Collected:  06/25/19 1342    Order Status:  Completed Specimen:  Blood from Peripheral Updated:  06/30/19 1500     Significant Indicator NEG     Source BLD     Site PERIPHERAL     Culture Result No growth after 5 days of incubation.    Narrative:       Per Hospital Policy: Only change Specimen Src: to \"Line\" if  specified by physician order.  Right Hand    GRAM STAIN [[id number]] Collected:  06/28/19 1658    Order Status:  Completed Specimen:  Tissue Updated:  06/29/19 2315     Significant Indicator .     Source TISS     Site 5th Right Metatarsal     Gram Stain Result No organisms seen.    Narrative:       Surgery Specimen    Acid Fast Stain [[id number]] Collected:  06/28/19 1658    Order Status:  Completed Specimen:  Tissue Updated:  06/29/19 2315     Significant Indicator NEG     Source TISS     Site 5th Right Metatarsal     AFB Smear Results No acid fast bacilli seen.    Narrative:       Surgery Specimen    GRAM STAIN [[id number]] Collected:  06/28/19 1647    Order Status:  Completed Specimen:  Tissue Updated:  06/29/19 2315    "  Significant Indicator .     Source TISS     Site 2nd and 3rd Left Metatarsals     Gram Stain Result No organisms seen.    Narrative:       Surgery Specimen    Acid Fast Stain [[id number]] Collected:  06/28/19 1647    Order Status:  Completed Specimen:  Tissue Updated:  06/29/19 4441     Significant Indicator NEG     Source TISS     Site 2nd and 3rd Left Metatarsals     AFB Smear Results No acid fast bacilli seen.    Narrative:       Surgery Specimen          Assessment:  Active Hospital Problems    Diagnosis   • Diabetic foot infection ([facility]) [E11.628, L08.9]   • Venous stasis dermatitis of both lower extremities [I87.2]   • CKD (chronic kidney disease), stage III ([facility]) [N18.3]   • Edema [R60.9]   • Type 2 diabetes mellitus with kidney complication, without long-term current use of insulin ([facility]) [E11.29]     Interval 24 hour assessment:    AF, O2 2.5 L NC      Plan:  Bilateral diabetic foot ulcers with underlying osteomyelitis, on treatment  Afebrile  No leukocytosis  Blood cultures on 6/25 negative  Underwent bilateral tendon Achilles lengthening, bilateral second third fourth and fifth tenotomies, right fifth metatarsal head excision, left second and third metatarsal head excision on 6/28 with Dr. Cooper.  OR culture of right fifth metatarsal +E faecalis  Being followed by LPS  Continue IV Unasyn 3 g every 6 hours  Plan for 6 weeks IV antibiotics due to positive OR bone culture  Estimated end date 8/9/2019  Recommend SNF placement-[facility] in [place].  Patient agreeable     Uncontrolled type 2 diabetes mellitus  Hemoglobin A1c 10.3% on 6/26/2019  Will adversely affect wound healing and resolution of infection  Blood sugar 83 today     Stage III chronic kidney disease.  Stable  CrCl~ 50-55  Renally adjust antibiotics as needed  Avoid nephrotoxins  Continue to monitor closely     Disposition: SNF placement

## 2019-07-05 NOTE — PROGRESS NOTES
Received patient from T804. Patient is AAOx4. No acute distress noted. Vitals stable. Currently on 3L NC. PICC line on R upper arm. Patient is a x2 assist. Condom cath on. Dressings changed per MAR. Patient oriented to the room, bed and unit. Fall safety and call bell use education provided. All appropriate fall precautions placed. 2 RN skin check completed. Patient denies further needs at this time.

## 2019-07-05 NOTE — PROGRESS NOTES
Received report from night shift RN Breanne. Assumed care of patient. Patient is medical and not on the monitor at this time. Patient is resting in the bed with no family present at bedside at this time. Patient declines any needs at this time. Plan of care discussed with patient. Bed locked and in the lowest position with call light within reach.

## 2019-07-05 NOTE — DISCHARGE PLANNING
Received Choice form at 1147  Agency/Facility Name: [facility]  Referral sent per Choice form [id number]

## 2019-07-05 NOTE — ASSESSMENT & PLAN NOTE
Ischemic cardiomyopathy secondary to severe CAD.  Not a candidate for surgical intervention per cardiology.  Continue aggressive medical management as above.

## 2019-07-05 NOTE — PROGRESS NOTES
2 RN skin check complete with RY Peraza.   Devices in place: R upper arm PICC, PIV, ortho boots, tubi  stockings, condom cath, and nasal cannula.  Skin assessed under devices: skin intact.   Confirmed pressure ulcers found on: diabetic foot ulcers L and R plantar. Pressure injury to back of bilateral ears.   New potential pressure ulcers noted on: Generalized bruising and scabbing to upper and lower extremities. Scab noted to R cheek KEANU. Biotin dressings to L and R elbow, with small open area to R elbow. Abrasion to R knee, KEANU. Old surgical incision to L knee with biotin dressing in place. Bilateral shins jose carlos with weeping. Groin and scrotum red and excoriated. Sutures noted to back of heels bilaterally, well approximated, with biotin dressings in place. Wound to L plantar foot with hypafix tape and packed, and sutures to L dorsal foot below second metatarsal, well approximated and pink.  Wound to R plantar foot, wound bed red and pink and sutures to incision site on lateral foot, well approximated and pink. No signs of discharge. Gauze placed in between toes.  The following interventions in place: [id number], pillows for comfort, martha cream, oxygen offloaded to cheeks, wound following with orders for dressing changes, ortho boots in place, and incontinence care provided with linen changes.

## 2019-07-06 PROBLEM — E87.0 HYPERNATREMIA: Status: ACTIVE | Noted: 2019-07-06

## 2019-07-06 LAB
ANION GAP SERPL CALC-SCNC: 6 MMOL/L (ref 0–11.9)
BUN SERPL-MCNC: 32 MG/DL (ref 8–22)
CALCIUM SERPL-MCNC: 8.7 MG/DL (ref 8.5–10.5)
CHLORIDE SERPL-SCNC: 99 MMOL/L (ref 96–112)
CO2 SERPL-SCNC: 42 MMOL/L (ref 20–33)
CREAT SERPL-MCNC: 1.5 MG/DL (ref 0.5–1.4)
ERYTHROCYTE [DISTWIDTH] IN BLOOD BY AUTOMATED COUNT: 52.3 FL (ref 35.9–50)
GLUCOSE BLD-MCNC: 141 MG/DL (ref 65–99)
GLUCOSE BLD-MCNC: 166 MG/DL (ref 65–99)
GLUCOSE BLD-MCNC: 184 MG/DL (ref 65–99)
GLUCOSE SERPL-MCNC: 144 MG/DL (ref 65–99)
HCT VFR BLD AUTO: 30.4 % (ref 42–52)
HGB BLD-MCNC: 9.2 G/DL (ref 14–18)
MCH RBC QN AUTO: 30 PG (ref 27–33)
MCHC RBC AUTO-ENTMCNC: 30.3 G/DL (ref 33.7–35.3)
MCV RBC AUTO: 99 FL (ref 81.4–97.8)
PLATELET # BLD AUTO: 149 K/UL (ref 164–446)
PMV BLD AUTO: 10.7 FL (ref 9–12.9)
POTASSIUM SERPL-SCNC: 3.4 MMOL/L (ref 3.6–5.5)
RBC # BLD AUTO: 3.07 M/UL (ref 4.7–6.1)
SODIUM SERPL-SCNC: 147 MMOL/L (ref 135–145)
WBC # BLD AUTO: 6.8 K/UL (ref 4.8–10.8)

## 2019-07-06 PROCEDURE — 700102 HCHG RX REV CODE 250 W/ 637 OVERRIDE(OP): Performed by: INTERNAL MEDICINE

## 2019-07-06 PROCEDURE — 700102 HCHG RX REV CODE 250 W/ 637 OVERRIDE(OP): Performed by: FAMILY MEDICINE

## 2019-07-06 PROCEDURE — 85027 COMPLETE CBC AUTOMATED: CPT

## 2019-07-06 PROCEDURE — 82962 GLUCOSE BLOOD TEST: CPT

## 2019-07-06 PROCEDURE — 700105 HCHG RX REV CODE 258: Performed by: INTERNAL MEDICINE

## 2019-07-06 PROCEDURE — 770006 HCHG ROOM/CARE - MED/SURG/GYN SEMI*

## 2019-07-06 PROCEDURE — A9270 NON-COVERED ITEM OR SERVICE: HCPCS | Performed by: INTERNAL MEDICINE

## 2019-07-06 PROCEDURE — A9270 NON-COVERED ITEM OR SERVICE: HCPCS | Performed by: FAMILY MEDICINE

## 2019-07-06 PROCEDURE — 99232 SBSQ HOSP IP/OBS MODERATE 35: CPT | Performed by: INTERNAL MEDICINE

## 2019-07-06 PROCEDURE — 80048 BASIC METABOLIC PNL TOTAL CA: CPT

## 2019-07-06 PROCEDURE — 700111 HCHG RX REV CODE 636 W/ 250 OVERRIDE (IP): Performed by: INTERNAL MEDICINE

## 2019-07-06 PROCEDURE — 99233 SBSQ HOSP IP/OBS HIGH 50: CPT | Performed by: INTERNAL MEDICINE

## 2019-07-06 RX ORDER — POTASSIUM CHLORIDE 20 MEQ/1
20 TABLET, EXTENDED RELEASE ORAL DAILY
Status: DISCONTINUED | OUTPATIENT
Start: 2019-07-07 | End: 2019-07-08

## 2019-07-06 RX ORDER — POTASSIUM CHLORIDE 20 MEQ/1
20 TABLET, EXTENDED RELEASE ORAL ONCE
Status: COMPLETED | OUTPATIENT
Start: 2019-07-06 | End: 2019-07-06

## 2019-07-06 RX ADMIN — HEPARIN SODIUM 5000 UNITS: 5000 INJECTION, SOLUTION INTRAVENOUS; SUBCUTANEOUS at 05:40

## 2019-07-06 RX ADMIN — SENNOSIDES, DOCUSATE SODIUM 2 TABLET: 50; 8.6 TABLET, FILM COATED ORAL at 05:40

## 2019-07-06 RX ADMIN — AMPICILLIN SODIUM AND SULBACTAM SODIUM 3 G: 2; 1 INJECTION, POWDER, FOR SOLUTION INTRAMUSCULAR; INTRAVENOUS at 23:34

## 2019-07-06 RX ADMIN — ATORVASTATIN CALCIUM 20 MG: 20 TABLET, FILM COATED ORAL at 05:40

## 2019-07-06 RX ADMIN — AMPICILLIN SODIUM AND SULBACTAM SODIUM 3 G: 2; 1 INJECTION, POWDER, FOR SOLUTION INTRAMUSCULAR; INTRAVENOUS at 11:34

## 2019-07-06 RX ADMIN — CARVEDILOL 6.25 MG: 6.25 TABLET, FILM COATED ORAL at 17:43

## 2019-07-06 RX ADMIN — TORSEMIDE 100 MG: 100 TABLET ORAL at 05:40

## 2019-07-06 RX ADMIN — HEPARIN SODIUM 5000 UNITS: 5000 INJECTION, SOLUTION INTRAVENOUS; SUBCUTANEOUS at 13:51

## 2019-07-06 RX ADMIN — HEPARIN SODIUM 5000 UNITS: 5000 INJECTION, SOLUTION INTRAVENOUS; SUBCUTANEOUS at 20:28

## 2019-07-06 RX ADMIN — INSULIN GLARGINE 10 UNITS: 100 INJECTION, SOLUTION SUBCUTANEOUS at 17:53

## 2019-07-06 RX ADMIN — AMPICILLIN SODIUM AND SULBACTAM SODIUM 3 G: 2; 1 INJECTION, POWDER, FOR SOLUTION INTRAMUSCULAR; INTRAVENOUS at 17:44

## 2019-07-06 RX ADMIN — AMPICILLIN SODIUM AND SULBACTAM SODIUM 3 G: 2; 1 INJECTION, POWDER, FOR SOLUTION INTRAMUSCULAR; INTRAVENOUS at 00:00

## 2019-07-06 RX ADMIN — INSULIN HUMAN 2 UNITS: 100 INJECTION, SOLUTION PARENTERAL at 17:50

## 2019-07-06 RX ADMIN — POTASSIUM CHLORIDE 20 MEQ: 20 TABLET, EXTENDED RELEASE ORAL at 05:40

## 2019-07-06 RX ADMIN — ASPIRIN 81 MG 81 MG: 81 TABLET ORAL at 05:40

## 2019-07-06 RX ADMIN — FAMOTIDINE 20 MG: 20 TABLET ORAL at 05:40

## 2019-07-06 RX ADMIN — AMPICILLIN SODIUM AND SULBACTAM SODIUM 3 G: 2; 1 INJECTION, POWDER, FOR SOLUTION INTRAMUSCULAR; INTRAVENOUS at 05:41

## 2019-07-06 ASSESSMENT — ENCOUNTER SYMPTOMS
HEARTBURN: 0
TINGLING: 0
DIARRHEA: 0
ABDOMINAL PAIN: 0
TREMORS: 0
DIAPHORESIS: 0
BLURRED VISION: 0
VOMITING: 0
SENSORY CHANGE: 0
BACK PAIN: 0
SHORTNESS OF BREATH: 0
HEADACHES: 0
COUGH: 0
WEAKNESS: 1
MYALGIAS: 0
DEPRESSION: 0
SPEECH CHANGE: 0
CHILLS: 0
NERVOUS/ANXIOUS: 0
CONSTIPATION: 0
PALPITATIONS: 0
SORE THROAT: 0
FEVER: 0
DIZZINESS: 0
NAUSEA: 0

## 2019-07-06 ASSESSMENT — LIFESTYLE VARIABLES: SUBSTANCE_ABUSE: 0

## 2019-07-06 NOTE — CARE PLAN
Problem: Safety  Goal: Will remain free from injury  Outcome: PROGRESSING AS EXPECTED  Fall precautions in place. Appropriate signs on doorway. IV pole on same side as bathroom. Bedrails up. Bed in lowest position and locked.  Call light and phone within reach. Patient educated on importance of calling nurses before getting out of bed, verbalizes understanding. Bed alarm on.     Problem: Venous Thromboembolism (VTW)/Deep Vein Thrombosis (DVT) Prevention:  Goal: Patient will participate in Venous Thrombosis (VTE)/Deep Vein Thrombosis (DVT)Prevention Measures  Outcome: PROGRESSING AS EXPECTED   07/05/19 2000   OTHER   Risk Assessment Score 3   VTE RISK High   Pharmacologic Prophylaxis Used Unfractionated Heparin

## 2019-07-06 NOTE — PROGRESS NOTES
Report received by dayshift RN. Assumed care of pt. Assessment complete. Pt A&Ox4, VSS. Currently on 2.5L NC. Pt in no apparent signs of distress. Dressings changed per wound care orders. Plan of care discussed. Call light within reach, bed in lowest position, and pt has no further questions at this time. Hourly rounding in place.

## 2019-07-06 NOTE — PROGRESS NOTES
2 RN skin check complete with RY Peraza.   Devices in place: R upper arm PICC, PIV, ortho boots, tubi  stockings, condom cath, and nasal cannula.  Skin assessed under devices: skin intact.   Confirmed pressure ulcers found on: diabetic foot ulcers L and R plantar. Pressure injury to back of bilateral ears.   New potential pressure ulcers noted on: Generalized bruising and scabbing to upper and lower extremities. Scab noted to R cheek KEANU. Biotin dressings to L and R elbow, with small open area to R elbow. Abrasion to R knee, KEANU. Old surgical incision to L knee with biotin dressing in place. Bilateral shins jose carlos with weeping. Groin and scrotum red and excoriated. Sutures noted to back of heels bilaterally, well approximated, with biotin dressings in place. Wound to L plantar foot with hypafix tape and packed, and sutures to L dorsal foot below second metatarsal, well approximated and pink.  Wound to R plantar foot, wound bed red and pink and sutures to incision site on lateral foot, well approximated and pink. No signs of discharge. Gauze placed in between toes.  The following interventions in place: [id number], pillows for comfort, martha cream, oxygen offloaded to cheeks, wound following with orders for dressing changes, ortho boots in place, and incontinence care provided with linen changes.

## 2019-07-06 NOTE — PROGRESS NOTES
Infectious Disease Progress Note    Author: Teresa Moreau M.D. Date & Time of service: 2019  9:23 AM       Chief Complaint:  Bilateral diabetic foot wounds/osteomyelitis     Interval History:  79-year-old male admitted on 2019 due to worsening bilateral foot wounds and worsening CHF.  Patient found to have bilateral lower extremity osteomyelitis.     -AF, WBC 5.5, no issue with antibiotics, denies pain, denies being short of breath.   Afebrile, white count 7000.  Patient tolerating antibiotics, no new issues.  -AF, no WBC, no issue with antibiotics, denies pain stating he feels just fine, complaining that he has not been able to get up and walk around.  7/3-AF, WBC 6.0, tolerating antibiotics, denies pain, agreeable to SNF placement.   afebrile WBC 7.5 patient transferred to see her tower overnight.  He denies any bilateral lower extremity pain.  No shortness of breath or cough.    afebrile WBC 6.8 patient sleeping but arousable.  He denies any complaints and feels well today.  Pending [facility]    Labs, medications and wounds reviewed.      Review of Systems:  Review of Systems   Constitutional: Negative for chills, diaphoresis, fever and malaise/fatigue.   Respiratory: Negative for cough and shortness of breath.    Cardiovascular: Positive for leg swelling. Negative for chest pain.   Gastrointestinal: Negative for abdominal pain, constipation, diarrhea, nausea and vomiting.   Genitourinary: Negative for dysuria.   Musculoskeletal: Negative for back pain, joint pain and myalgias.   Skin: Negative for itching and rash.   Neurological: Negative for sensory change and headaches.   Psychiatric/Behavioral: The patient is not nervous/anxious.    All other systems reviewed and are negative.      Hemodynamics:  Temp (24hrs), Av.6 °C (97.9 °F), Min:36.5 °C (97.7 °F), Max:36.9 °C (98.4 °F)  Temperature: 36.9 °C (98.4 °F)  Pulse  Av.7  Min: 56  Max: 105   Blood Pressure : (!) 93/55        Physical Exam:  Physical Exam   Constitutional: He is oriented to person, place, and time. He appears well-developed and well-nourished.   Elderly  Disheveled  Obese   HENT:   Head: Normocephalic and atraumatic.   Mouth/Throat: Oropharynx is clear and moist. No oropharyngeal exudate.   Poor dentition-edentulous   Eyes: Pupils are equal, round, and reactive to light. Conjunctivae and EOM are normal.   Neck: Normal range of motion. Neck supple. No JVD present.   Cardiovascular: Normal rate, regular rhythm and normal heart sounds.  Exam reveals no gallop and no friction rub.    Faint distal pulses to BLE   Pulmonary/Chest: Effort normal. No respiratory distress. He has no rales.   Abdominal: Soft. Bowel sounds are normal. He exhibits no distension. There is no tenderness.   Genitourinary:   Genitourinary Comments: Condom cath in place    Musculoskeletal: He exhibits edema (+1 BLE) and deformity. He exhibits no tenderness.   Bilateral Achilles-sutures in place, no active drainage, no erythema, nontender to palpation.  Left second/third and right fifth MTH incisions-sutures in place, no active drainage, trace erythema along incisions.  Tenotomy sites with sutures in place, no active drainage, no erythema.  Right plantar ulceration-wound bed pink with packing in place to small section, scant serous drainage, no odor, no erythema or maceration to tissue, nontender to palpation.  Left plantar ulceration-wound bed packed with silver dressing, no erythema or maceration to surrounding tissue, nontender to palpation.    RUE PICC- CDI, non tender, no erythema.   Neurological: He is alert and oriented to person, place, and time. No cranial nerve deficit. Coordination normal.   Skin: Skin is warm and dry. No rash noted. He is not diaphoretic. There is erythema.   Psychiatric: He has a normal mood and affect. His behavior is normal.   Nursing note and vitals reviewed.      Meds:    Current Facility-Administered Medications:   •   [START ON 7/7/2019] potassium chloride SA  •  ampicillin-sulbactam (UNASYN) IV  •  insulin glargine  •  carvedilol  •  torsemide  •  losartan  •  insulin regular **AND** Accu-Chek ACHS **AND** NOTIFY MD and PharmD **AND** glucose **AND** dextrose 10% bolus  •  Respiratory Care per Protocol  •  senna-docusate **AND** polyethylene glycol/lytes **AND** magnesium hydroxide **AND** bisacodyl  •  heparin  •  acetaminophen  •  albuterol  •  aspirin  •  atorvastatin  •  famotidine    Labs:  Recent Labs      07/04/19   0420  07/05/19   0232  07/06/19   0222   WBC  6.0  7.5  6.8   RBC  3.29*  3.15*  3.07*   HEMOGLOBIN  10.0*  9.2*  9.2*   HEMATOCRIT  32.5*  31.2*  30.4*   MCV  98.8*  99.0*  99.0*   MCH  30.4  29.2  30.0   RDW  52.3*  52.6*  52.3*   PLATELETCT  170  170  149*   MPV  10.3  10.3  10.7     Recent Labs      07/04/19   0420  07/05/19   0232  07/06/19   0222   SODIUM  144  146*  147*   POTASSIUM  3.5*  3.1*  3.4*   CHLORIDE  98  97  99   CO2  42*  43*  42*   GLUCOSE  157*  83  144*   BUN  32*  33*  32*     Recent Labs      07/04/19   0420  07/05/19 0232  07/06/19 0222   CREATININE  1.54*  1.41*  1.50*       Imaging:  No new imaging within the last 24 hours.    Micro:  Results     Procedure Component Value Units Date/Time    CULTURE TISSUE W/ GRM STAIN [[id number]] Collected:  06/28/19 1647    Order Status:  Completed Specimen:  Tissue Updated:  07/03/19 1348     Significant Indicator NEG     Source TISS     Site 2nd and 3rd Left Metatarsals     Culture Result No growth at 72 hours.     Gram Stain Result No organisms seen.    Narrative:       Surgery Specimen    AFB Culture [[id number]] Collected:  06/28/19 1647    Order Status:  Completed Specimen:  Tissue Updated:  07/03/19 1348     Significant Indicator NEG     Source TISS     Site 2nd and 3rd Left Metatarsals     Culture Result Culture in progress.     AFB Smear Results No acid fast bacilli seen.    Narrative:       Surgery Specimen    Anaerobic Culture  [[id number]] Collected:  06/28/19 1647    Order Status:  Completed Specimen:  Tissue Updated:  07/03/19 1348     Significant Indicator NEG     Source TISS     Site 2nd and 3rd Left Metatarsals     Culture Result No Anaerobes isolated.    Narrative:       Surgery Specimen    Fungal Culture [[id number]] Collected:  06/28/19 1647    Order Status:  Completed Specimen:  Tissue Updated:  07/03/19 1348     Significant Indicator NEG     Source TISS     Site 2nd and 3rd Left Metatarsals     Culture Result No fungal growth to date.    Narrative:       Surgery Specimen    AFB Culture [[id number]] Collected:  06/28/19 1658    Order Status:  Completed Specimen:  Tissue Updated:  07/02/19 1004     Significant Indicator NEG     Source TISS     Site 5th Right Metatarsal     Culture Result Culture in progress.     AFB Smear Results No acid fast bacilli seen.    Narrative:       Surgery Specimen    Fungal Culture [[id number]] Collected:  06/28/19 1658    Order Status:  Completed Specimen:  Tissue Updated:  07/02/19 1004     Significant Indicator NEG     Source TISS     Site 5th Right Metatarsal     Culture Result No fungal growth to date.    Narrative:       Surgery Specimen    Anaerobic Culture [[id number]] Collected:  06/28/19 1658    Order Status:  Completed Specimen:  Tissue Updated:  07/02/19 1004     Significant Indicator NEG     Source TISS     Site 5th Right Metatarsal     Culture Result No Anaerobes isolated.    Narrative:       Surgery Specimen    CULTURE TISSUE W/ GRM STAIN [[id number]]  (Abnormal)  (Susceptibility) Collected:  06/28/19 1658    Order Status:  Completed Specimen:  Tissue Updated:  07/02/19 1004     Significant Indicator POS (POS)     Source TISS     Site 5th Right Metatarsal     Culture Result Growth noted after further incubation, see below for  organism identification.   (A)     Gram Stain Result No organisms seen.     Culture Result Enterococcus faecalis  Rare growth  Combination therapy with ampicillin,  "penicillin, or  vancomycin (for susceptible strains) plus an aminoglycoside  is usually indicated for serious enterococcal infections,  such as endocarditis unless high-level resistance to both  gentamicin and streptomycin is documented; such combinations  are predicted to result in synergistic killing of the  Enterococcus.   (A)    Narrative:       Surgery Specimen    Culture & Susceptibility     ENTEROCOCCUS FAECALIS     Antibiotic Sensitivity Microscan Unit Status    Ampicillin Sensitive <=2 mcg/mL Final    Method: PETTY    Daptomycin Sensitive 1 mcg/mL Final    Method: PETTY    Gent Synergy Sensitive <=500 mcg/mL Final    Method: PETTY    Penicillin Sensitive 2 mcg/mL Final    Method: PETTY    Vancomycin Sensitive 2 mcg/mL Final    Method: PETTY                       BLOOD CULTURE [[id number]] Collected:  06/25/19 1237    Order Status:  Completed Specimen:  Blood from Peripheral Updated:  06/30/19 1500     Significant Indicator NEG     Source BLD     Site PERIPHERAL     Culture Result No growth after 5 days of incubation.    Narrative:       Per Hospital Policy: Only change Specimen Src: to \"Line\" if  specified by physician order.  No site indicated    Blood Culture [[id number]] Collected:  06/25/19 1342    Order Status:  Completed Specimen:  Blood from Peripheral Updated:  06/30/19 1500     Significant Indicator NEG     Source BLD     Site PERIPHERAL     Culture Result No growth after 5 days of incubation.    Narrative:       Per Hospital Policy: Only change Specimen Src: to \"Line\" if  specified by physician order.  Right Hand    GRAM STAIN [[id number]] Collected:  06/28/19 1658    Order Status:  Completed Specimen:  Tissue Updated:  06/29/19 2315     Significant Indicator .     Source TISS     Site 5th Right Metatarsal     Gram Stain Result No organisms seen.    Narrative:       Surgery Specimen    Acid Fast Stain [[id number]] Collected:  06/28/19 1658    Order Status:  Completed Specimen:  Tissue Updated:  06/29/19 2315    "  Significant Indicator NEG     Source TISS     Site 5th Right Metatarsal     AFB Smear Results No acid fast bacilli seen.    Narrative:       Surgery Specimen    GRAM STAIN [[id number]] Collected:  06/28/19 1647    Order Status:  Completed Specimen:  Tissue Updated:  06/29/19 2315     Significant Indicator .     Source TISS     Site 2nd and 3rd Left Metatarsals     Gram Stain Result No organisms seen.    Narrative:       Surgery Specimen    Acid Fast Stain [[id number]] Collected:  06/28/19 1647    Order Status:  Completed Specimen:  Tissue Updated:  06/29/19 2315     Significant Indicator NEG     Source TISS     Site 2nd and 3rd Left Metatarsals     AFB Smear Results No acid fast bacilli seen.    Narrative:       Surgery Specimen          Assessment:  Active Hospital Problems    Diagnosis   • Diabetic foot infection ([facility]) [E11.628, L08.9]   • Venous stasis dermatitis of both lower extremities [I87.2]   • CKD (chronic kidney disease), stage III ([facility]) [N18.3]   • Type 2 diabetes mellitus with kidney complication, without long-term current use of insulin ([facility]) [E11.29]       Plan:  Bilateral diabetic foot ulcers with underlying osteomyelitis, on treatment  Afebrile  No leukocytosis  Blood cultures on 6/25 negative  Underwent bilateral tendon Achilles lengthening, bilateral second third fourth and fifth tenotomies, right fifth metatarsal head excision, left second and third metatarsal head excision on 6/28 with Dr. Cooper.  OR culture of right fifth metatarsal +E faecalis  Being followed by LPS  Continue IV Unasyn 3 g every 6 hours  Plan for 6 weeks IV antibiotics due to positive OR bone culture  Estimated end date 8/9/2019     Uncontrolled type 2 diabetes mellitus  Hemoglobin A1c 10.3% on 6/26/2019  Will adversely affect wound healing and resolution of infection     Stage III chronic kidney disease.  Stable  CrCl~ 50-55  Renally adjust antibiotics as needed  Avoid nephrotoxins  Continue to monitor closely     I have  performed a physical exam and reviewed and updated ROS and plan today 7/6/2019.  In review of yesterday's note 7/5/2019, there are no changes except as documented above.    Disposition: Steven BOWMAN -okay to transfer from ID standpoint when bed available    DW Dr. Bradley

## 2019-07-06 NOTE — PROGRESS NOTES
"Bedside report received pt is A&Ox3, pt was reoriented to time. Pt is encouraged to drink plenty of fluids, pt refuses to drink nectar thick fluids. Pt stated \"Don't bring me that crap\" pt was educated regarding the importance of following diet orders and importance of hydrating. Pt requires reinforcement with education. MD was notified.Q2 turns in place, pt is on 2.5L O2 nasal cannula.  POC discussed with pt; all questions answered at this time.   "

## 2019-07-06 NOTE — PROGRESS NOTES
[facility] Medicine Daily Progress Note    Date of Service  7/6/2019    Chief Complaint  79 y.o. male admitted 6/25/2019 with BLE swelling and diabetic foot infection.    Hospital Course    H/O COPD, CAD, HTN, DM, HLD.  Patient follows with outpatient wound care for chronic, nonhealing bilateral diabetic foot wounds.  Presented to ED after developing worsening erythema and swelling of BLE as well as purulent drainage from foot wounds.  Started on antibiotics.  ID and LPS consulted.  S/P metatarsal head resection of right 5th and left 2nd and 3rd on 6/28. OR cx positive for E. Faecalis.  Will need 6 weeks of IV antibiotics.  Also found to have a new diagnosis of CHF with EF of 35% on echo.  Mild volume overload on admission with BLE edema, improved with diuresis.  Evaluated by cardiology.  Heart failure suspected to be secondary to ischemic cardiomyopathy with known severe CAD with multivessel disease.  Poor surgical candidate secondary to comorbidities.  Continue medical management.  He is also noted to have significant dysphagia, although he refuses FEES for aspiration evaluation.  Patient wishes to eat despite aspiration risk.  Physician had extensive discussion of overall prognosis with family and patient.  Decision was made to transition to DNR status.          Interval Problem Update  7/5:  PICC in place.  Denies pain.  Anxious to discharge to skilled.  BLE edema improving.  Bilateral offloading boots in place.  BP is soft.  Denies lightheadedness, dizziness, or shortness of breath.  BG well controlled.   7/6:  Na 147.  Encourage adequate daily fluid intake.  Denies pain or discomfort.  Volume status stable.  VSS.        Consultants/Specialty  ID  LPS  Cardiology    Code Status  DNR    Disposition  Pending SNF vs LTACH.  CM following.     Review of Systems  Review of Systems   Constitutional: Negative for chills and fever.   HENT: Negative for congestion and sore throat.    Eyes: Negative for blurred vision.    Respiratory: Negative for shortness of breath.    Cardiovascular: Positive for leg swelling. Negative for palpitations.   Gastrointestinal: Negative for abdominal pain, constipation, diarrhea, heartburn, nausea and vomiting.   Genitourinary: Negative for urgency.   Musculoskeletal: Negative for myalgias.   Skin: Negative for itching and rash.   Neurological: Positive for weakness. Negative for dizziness, tingling, tremors, sensory change, speech change and headaches.   Psychiatric/Behavioral: Negative for depression and substance abuse.        Physical Exam  Temp:  [36.5 °C (97.7 °F)-36.9 °C (98.4 °F)] 36.9 °C (98.4 °F)  Pulse:  [77-79] 79  Resp:  [16-18] 18  BP: ()/(48-70) 93/55  SpO2:  [92 %-99 %] 92 %    Physical Exam   Constitutional: He is oriented to person, place, and time. He appears well-developed and well-nourished.   Obese male.    HENT:   Head: Normocephalic and atraumatic.   Eyes: Conjunctivae are normal. Right eye exhibits no discharge. Left eye exhibits no discharge.   Neck: Normal range of motion. No tracheal deviation present.   Cardiovascular: Normal rate, regular rhythm and normal heart sounds.    No murmur heard.  BLE edema   Pulmonary/Chest: Effort normal. No stridor. No respiratory distress. He has decreased breath sounds in the right lower field and the left lower field. He has no wheezes.   Abdominal: Soft. Bowel sounds are normal. He exhibits no distension. There is no tenderness. There is no rebound.   Musculoskeletal: He exhibits edema and tenderness.   Generalized weakness     Neurological: He is alert and oriented to person, place, and time.   Skin: Skin is warm and dry. He is not diaphoretic.   Stasis dermatitis noted to BLE   Surgical wounds to bilateral feet with dressings intact    Psychiatric: He has a normal mood and affect.   Nursing note and vitals reviewed.      Fluids    Intake/Output Summary (Last 24 hours) at 07/06/19 1037  Last data filed at 07/06/19 0530   Gross per  24 hour   Intake              640 ml   Output              800 ml   Net             -160 ml       Laboratory  Recent Labs      07/04/19   0420  07/05/19   0232  07/06/19   0222   WBC  6.0  7.5  6.8   RBC  3.29*  3.15*  3.07*   HEMOGLOBIN  10.0*  9.2*  9.2*   HEMATOCRIT  32.5*  31.2*  30.4*   MCV  98.8*  99.0*  99.0*   MCH  30.4  29.2  30.0   MCHC  30.8*  29.5*  30.3*   RDW  52.3*  52.6*  52.3*   PLATELETCT  170  170  149*   MPV  10.3  10.3  10.7     Recent Labs      07/04/19 0420 07/05/19 0232 07/06/19 0222   SODIUM  144  146*  147*   POTASSIUM  3.5*  3.1*  3.4*   CHLORIDE  98  97  99   CO2  42*  43*  42*   GLUCOSE  157*  83  144*   BUN  32*  33*  32*   CREATININE  1.54*  1.41*  1.50*   CALCIUM  9.0  8.6  8.7                   Imaging  IR-PICC LINE PLACEMENT W/ GUIDANCE > AGE 5   Final Result                  Ultrasound-guided PICC placement performed by qualified nursing staff as    above.          US-RACHEL SINGLE LEVEL BILAT   Final Result      EC-ECHOCARDIOGRAM COMPLETE W/ CONT   Final Result      US-EXTREMITY VENOUS LOWER BILAT   Final Result      DX-SHOULDER 2+ RIGHT   Final Result      1.  No acute findings.      2.  Severe degenerative change in the right acromioclavicular joint.      DX-FOOT-COMPLETE 3+ RIGHT   Final Result      1.  Lucency in the right fifth toe, possibly representing osteomyelitis. If clinically indicated, MRI could be performed for confirmation.      2.  Soft tissue edema within the forefoot.      DX-FOOT-COMPLETE 3+ LEFT   Final Result      1.  No definitive evidence for osteomyelitis.      2.  Forefoot deformities as described.      DX-CHEST-PORTABLE (1 VIEW)   Final Result      Bilateral interstitial opacities may represent interstitial edema or pneumonitis.      Mild cardiomegaly.      Atherosclerotic plaque.              Assessment/Plan    Acute systolic CHF (congestive heart failure) (HCC)   Assessment & Plan    Echo reveals EF of 35%  New diagnosis  Secondary to ischemic  cardiomyopathy with known severe CAD with multivessel disease.  Was deemed a poor surgical candidate.  Further invasive cardiac procedure or surgery would not change the overall mortality of this patient per cardiology    Optimize medical management.  Continue coreg, losartan, and torsemide.  Hold off on aldactone for now as BP is soft.        Diabetic foot infection (HCC)   Assessment & Plan    History of chronic diabetic wounds to bilateral feet.  Has been following with outpatient wound clinic  Recent worsening of wounds with purulent drainage.   He found to have possible right foot osteomyelitis on x-ray.  S/p metatarsal head resection of right 5th and left 2nd and 3rd.    OR culture of right fifth metatarsal +E faecalis  ID following.  Will need 6 weeks IV antibiotics   Continue Unasyn with a stop date 8/9/2019  LPS following.  Continue tight glycemic control.  Pending LTACH for antibiotics and wound care.        Hypernatremia   Assessment & Plan    Sodium 147  Likely secondary to dehydration  Encourage adequate daily oral intake.  Monitor bmp.      Generalized weakness   Assessment & Plan    PT/OT following.  Pending SNF vs LTACH     Oropharyngeal dysphagia- (present on admission)   Assessment & Plan    -Patient has very weak cough during the day however patient said this is his baseline.  -Noticed significant risk of aspiration evaluated by bedside nurse, me, and speech therapist.  -Per speech therapist recommend patient to have fees test however patient refused.  -Further discussion about the risk of aspiration patient would like to take the risk and eat for pleasure.  -Patient also wants to change CODE STATUS to DNR.  -Patient currently is competent to make medical decision.  We will respect patient wishes and start patient on safest diet currently as dysphagia II with nectar thick.     Continue modified diet to reduce relatively risk of aspiration     DNR (do not resuscitate)   Assessment & Plan    Patient  is competent to make medical decision.  CODE STATUS remain on DNR     Cardiomyopathy ([facility])   Assessment & Plan    Ischemic cardiomyopathy secondary to severe CAD.  Not a candidate for surgical intervention per cardiology.  Continue aggressive medical management as above.      Venous stasis dermatitis of both lower extremities   Assessment & Plan    Secondary to chronic edema  Continue diuretics, changed to torsemide by cardiologist.  Skin care.      Macrocytosis   Assessment & Plan    TSH, vitamin B12 and folic acid wnl.     Elevated troponin   Assessment & Plan    He found to have mildly elevated troponin.  He denies any symptoms of chest pain.  EKG stable  Stress induced secondary to volume overload  Echo shows EF 35%.  Continue diuretics, changed to torsemide by cardiologist.         CKD (chronic kidney disease), stage III ([facility])- (present on admission)   Assessment & Plan    History of chronic kidney disease.  Currently renal functions are at the baseline.  Avoid nephrotoxins.  Continue to monitor  Kidney function remained stable.     CAD (coronary artery disease)- (present on admission)   Assessment & Plan    Hx of  Continue lipitor and ASA.      Mixed hyperlipidemia- (present on admission)   Assessment & Plan    Continue lipitor.      Edema- (present on admission)   Assessment & Plan    Chronic BLE edema.  Secondary to CHF.  Overall improved.   Continue torsemide.        COPD (chronic obstructive pulmonary disease) ([facility])- (present on admission)   Assessment & Plan    Not in acute exacerbation.  Continue RT protocol, BT, and supplemental o2.      Type 2 diabetes mellitus with kidney complication, without long-term current use of insulin ([facility])- (present on admission)   Assessment & Plan    A1c 10.3  BG better controlled on current regimen  Continue lantus and SSI.  Diabetic diet and accuchecks.      Essential hypertension, benign- (present on admission)   Assessment & Plan    Blood pressures are soft.  Continue  losartan, coreg, and torsemide.                VTE prophylaxis: Heparin

## 2019-07-06 NOTE — CARE PLAN
Problem: Knowledge Deficit  Goal: Knowledge of disease process/condition, treatment plan, diagnostic tests, and medications will improve  Outcome: PROGRESSING AS EXPECTED  Pt educated regarding plan of care and medications. All questions answered.     Problem: Skin Integrity  Goal: Risk for impaired skin integrity will decrease  Outcome: PROGRESSING AS EXPECTED  Pt turned and positioned every two hours. Incontinence care provided and barrier paste applied as needed.

## 2019-07-06 NOTE — CARE PLAN
Problem: Respiratory:  Goal: Respiratory status will improve  Outcome: PROGRESSING AS EXPECTED  Pt lung sounds diminished, encouraging deep breathing, IS and ambulation or up in chair as tolerated more during day    Problem: Mobility  Goal: Risk for activity intolerance will decrease  Outcome: PROGRESSING AS EXPECTED  Pt unsteady and tires quickly, requires 2 person assist and FWW to get up into chair.

## 2019-07-06 NOTE — PROGRESS NOTES
2 RN skin check complete with RY Guillen.   Devices in place R upper arm PICC, PIV, ortho boots, tubi  stockings, condom cath and nasal cannula.  Skin assessed under devices skin intact.  Confirmed pressure ulcers found on diabetic foot ulcers L and R plantar. Pressure injury to back of bilateral ears.  New potential pressure ulcers noted on Generalized bruising and scabbing to upper and lower extremities. Biotin dressings to L and R elbow with small open area to R elbow. Abrasion to R knee. Old surgical incision to L knee with biotin dressings in place. Wound to L plantar foot with hypafix tape and packed, sutures to L dorsal foot below second metatarsal, well approximated and pink. Wound to R plantar foot, wound bed red and pink and sutures to incision site on lateral foot, well approximated and pink. No signs of discharge. Gauze placed in between toes.  The following interventions in place Q2 turns, pillows for comfort, martha cream, oxygen offloaded to cheeks, wound following with orders for dressing changes, ortho boots in place and incontinence care provided with linen changes.

## 2019-07-06 NOTE — ASSESSMENT & PLAN NOTE
Fluctuating   Likely secondary to poor oral fluid intake.   Patient refusing to increase oral fluid intake as he does not like the thickener required for dysphagia.  Continue decreased torsemide dose  Monitor bmp.

## 2019-07-07 PROBLEM — E87.6 HYPOKALEMIA: Status: ACTIVE | Noted: 2019-07-07

## 2019-07-07 LAB
ANION GAP SERPL CALC-SCNC: 3 MMOL/L (ref 0–11.9)
BUN SERPL-MCNC: 29 MG/DL (ref 8–22)
CALCIUM SERPL-MCNC: 9 MG/DL (ref 8.5–10.5)
CHLORIDE SERPL-SCNC: 98 MMOL/L (ref 96–112)
CO2 SERPL-SCNC: 43 MMOL/L (ref 20–33)
CREAT SERPL-MCNC: 1.39 MG/DL (ref 0.5–1.4)
GLUCOSE BLD-MCNC: 145 MG/DL (ref 65–99)
GLUCOSE BLD-MCNC: 170 MG/DL (ref 65–99)
GLUCOSE BLD-MCNC: 172 MG/DL (ref 65–99)
GLUCOSE SERPL-MCNC: 128 MG/DL (ref 65–99)
POTASSIUM SERPL-SCNC: 3.4 MMOL/L (ref 3.6–5.5)
SODIUM SERPL-SCNC: 144 MMOL/L (ref 135–145)

## 2019-07-07 PROCEDURE — A9270 NON-COVERED ITEM OR SERVICE: HCPCS | Performed by: NURSE PRACTITIONER

## 2019-07-07 PROCEDURE — 700102 HCHG RX REV CODE 250 W/ 637 OVERRIDE(OP): Performed by: INTERNAL MEDICINE

## 2019-07-07 PROCEDURE — 700105 HCHG RX REV CODE 258: Performed by: INTERNAL MEDICINE

## 2019-07-07 PROCEDURE — 700102 HCHG RX REV CODE 250 W/ 637 OVERRIDE(OP): Performed by: NURSE PRACTITIONER

## 2019-07-07 PROCEDURE — A9270 NON-COVERED ITEM OR SERVICE: HCPCS | Performed by: INTERNAL MEDICINE

## 2019-07-07 PROCEDURE — 80048 BASIC METABOLIC PNL TOTAL CA: CPT

## 2019-07-07 PROCEDURE — 700111 HCHG RX REV CODE 636 W/ 250 OVERRIDE (IP): Performed by: INTERNAL MEDICINE

## 2019-07-07 PROCEDURE — 770006 HCHG ROOM/CARE - MED/SURG/GYN SEMI*

## 2019-07-07 PROCEDURE — 99233 SBSQ HOSP IP/OBS HIGH 50: CPT | Performed by: INTERNAL MEDICINE

## 2019-07-07 PROCEDURE — 82962 GLUCOSE BLOOD TEST: CPT

## 2019-07-07 RX ADMIN — AMPICILLIN SODIUM AND SULBACTAM SODIUM 3 G: 2; 1 INJECTION, POWDER, FOR SOLUTION INTRAMUSCULAR; INTRAVENOUS at 23:31

## 2019-07-07 RX ADMIN — INSULIN HUMAN 2 UNITS: 100 INJECTION, SOLUTION PARENTERAL at 12:00

## 2019-07-07 RX ADMIN — AMPICILLIN SODIUM AND SULBACTAM SODIUM 3 G: 2; 1 INJECTION, POWDER, FOR SOLUTION INTRAMUSCULAR; INTRAVENOUS at 16:52

## 2019-07-07 RX ADMIN — CARVEDILOL 6.25 MG: 6.25 TABLET, FILM COATED ORAL at 07:29

## 2019-07-07 RX ADMIN — HEPARIN SODIUM 5000 UNITS: 5000 INJECTION, SOLUTION INTRAVENOUS; SUBCUTANEOUS at 14:12

## 2019-07-07 RX ADMIN — ATORVASTATIN CALCIUM 20 MG: 20 TABLET, FILM COATED ORAL at 05:26

## 2019-07-07 RX ADMIN — HEPARIN SODIUM 5000 UNITS: 5000 INJECTION, SOLUTION INTRAVENOUS; SUBCUTANEOUS at 05:25

## 2019-07-07 RX ADMIN — POTASSIUM CHLORIDE 20 MEQ: 20 TABLET, EXTENDED RELEASE ORAL at 05:26

## 2019-07-07 RX ADMIN — INSULIN HUMAN 2 UNITS: 100 INJECTION, SOLUTION PARENTERAL at 17:09

## 2019-07-07 RX ADMIN — TORSEMIDE 50 MG: 20 TABLET ORAL at 05:26

## 2019-07-07 RX ADMIN — FAMOTIDINE 20 MG: 20 TABLET ORAL at 05:25

## 2019-07-07 RX ADMIN — ASPIRIN 81 MG 81 MG: 81 TABLET ORAL at 05:25

## 2019-07-07 RX ADMIN — INSULIN GLARGINE 10 UNITS: 100 INJECTION, SOLUTION SUBCUTANEOUS at 17:07

## 2019-07-07 RX ADMIN — AMPICILLIN SODIUM AND SULBACTAM SODIUM 3 G: 2; 1 INJECTION, POWDER, FOR SOLUTION INTRAMUSCULAR; INTRAVENOUS at 05:26

## 2019-07-07 RX ADMIN — AMPICILLIN SODIUM AND SULBACTAM SODIUM 3 G: 2; 1 INJECTION, POWDER, FOR SOLUTION INTRAMUSCULAR; INTRAVENOUS at 11:52

## 2019-07-07 ASSESSMENT — ENCOUNTER SYMPTOMS
SPEECH CHANGE: 0
HEADACHES: 0
MYALGIAS: 0
PALPITATIONS: 0
NAUSEA: 0
CHILLS: 0
COUGH: 0
FEVER: 0
DIZZINESS: 0
BLURRED VISION: 0
DIARRHEA: 0
TINGLING: 0
TREMORS: 0
SHORTNESS OF BREATH: 0
VOMITING: 0
DEPRESSION: 0
DOUBLE VISION: 0
WEAKNESS: 1
SENSORY CHANGE: 0
HEARTBURN: 0
FOCAL WEAKNESS: 0
ABDOMINAL PAIN: 0

## 2019-07-07 ASSESSMENT — LIFESTYLE VARIABLES: SUBSTANCE_ABUSE: 0

## 2019-07-07 NOTE — PROGRESS NOTES
Bedside report received, pt care assumed. Dressings changed per wound care orders. Pt denies any additional needs at this time. Bed in lowest position, bed alarm on, pt educated on fall risk and verbalized understanding, call light within reach. Hourly rounding in place.

## 2019-07-07 NOTE — PROGRESS NOTES
Bedside report received and accepted care of patient. Patient currently resting in bed in no visible or stated signs of distress. Bed alarm in place, controls on and bed in locked and lowest position. Call light and personal possessions within reach. POC discussed with pt; all questions answered at this time.

## 2019-07-07 NOTE — PROGRESS NOTES
Notified on call hospitalist regarding a critical lab result for CO2=43. No interventions needed at this time. Will pass on to day team to address.

## 2019-07-07 NOTE — PROGRESS NOTES
[facility] Medicine Daily Progress Note    Date of Service  7/7/2019    Chief Complaint  79 y.o. male admitted 6/25/2019 with BLE swelling and diabetic foot infection.    Hospital Course    H/O COPD, CAD, HTN, DM, HLD.  Patient follows with outpatient wound care for chronic, nonhealing bilateral diabetic foot wounds.  Presented to ED after developing worsening erythema and swelling of BLE as well as purulent drainage from foot wounds.  Started on antibiotics.  ID and LPS consulted.  S/P metatarsal head resection of right 5th and left 2nd and 3rd on 6/28. OR cx positive for E. Faecalis.  Will need 6 weeks of IV antibiotics.  Also found to have a new diagnosis of CHF with EF of 35% on echo.  Mild volume overload on admission with BLE edema, improved with diuresis.  Evaluated by cardiology.  Heart failure suspected to be secondary to ischemic cardiomyopathy with known severe CAD with multivessel disease.  Poor surgical candidate secondary to comorbidities.  Continue medical management.  He is also noted to have significant dysphagia, although he refuses FEES for aspiration evaluation.  Patient wishes to eat despite aspiration risk.  Physician had extensive discussion of overall prognosis with family and patient.  Decision was made to transition to DNR status.          Interval Problem Update  7/5:  PICC in place.  Denies pain.  Anxious to discharge to skilled.  BLE edema improving.  Bilateral offloading boots in place.  BP is soft.  Denies lightheadedness, dizziness, or shortness of breath.  BG well controlled.   7/6:  Na 147.  Encourage adequate daily fluid intake.  Denies pain or discomfort.  Volume status stable.  VSS.  7/7:  Na 145.  Patient resting comfortably in bed with no acute needs.  VSS.  Encouraged to be out of bed daily.       Consultants/Specialty  ID  LPS  Cardiology    Code Status  DNR    Disposition  Pending SNF vs LTACH.  CM following.     Review of Systems  Review of Systems   Constitutional: Negative  for chills, fever and malaise/fatigue.   HENT: Negative for congestion.    Eyes: Negative for blurred vision and double vision.   Respiratory: Negative for cough and shortness of breath.    Cardiovascular: Positive for leg swelling. Negative for chest pain and palpitations.   Gastrointestinal: Negative for abdominal pain, diarrhea, heartburn, nausea and vomiting.   Genitourinary: Negative for dysuria and urgency.   Musculoskeletal: Negative for joint pain and myalgias.   Skin: Negative for rash.   Neurological: Positive for weakness. Negative for dizziness, tingling, tremors, sensory change, speech change, focal weakness and headaches.   Psychiatric/Behavioral: Negative for depression and substance abuse.        Physical Exam  Temp:  [36.4 °C (97.5 °F)-36.8 °C (98.3 °F)] 36.4 °C (97.5 °F)  Pulse:  [73-85] 73  Resp:  [16-18] 17  BP: ()/(55-62) 103/62  SpO2:  [88 %-99 %] 99 %    Physical Exam   Constitutional: He is oriented to person, place, and time. He appears well-developed and well-nourished. No distress.   Obese male.    HENT:   Head: Normocephalic and atraumatic.   Right Ear: External ear normal.   Left Ear: External ear normal.   Mouth/Throat: No oropharyngeal exudate.   Eyes: Conjunctivae are normal. No scleral icterus.   Neck: Normal range of motion. No JVD present.   Cardiovascular: Normal rate and regular rhythm.  Exam reveals no friction rub.    No murmur heard.  BLE edema   Pulmonary/Chest: Effort normal. No stridor. No respiratory distress. He has decreased breath sounds in the right lower field and the left lower field. He has no wheezes. He has no rales.   Abdominal: Soft. Bowel sounds are normal. He exhibits no distension. There is no tenderness.   Musculoskeletal: He exhibits edema.   Generalized weakness     Neurological: He is alert and oriented to person, place, and time. No cranial nerve deficit.   Skin: Skin is warm and dry. No rash noted. He is not diaphoretic.   Stasis dermatitis noted  to BLE   Surgical wounds to bilateral feet with dressings intact    Psychiatric: He has a normal mood and affect.   Nursing note and vitals reviewed.      Fluids    Intake/Output Summary (Last 24 hours) at 07/07/19 0947  Last data filed at 07/07/19 0526   Gross per 24 hour   Intake             1310 ml   Output             2550 ml   Net            -1240 ml       Laboratory  Recent Labs      07/05/19 0232 07/06/19 0222   WBC  7.5  6.8   RBC  3.15*  3.07*   HEMOGLOBIN  9.2*  9.2*   HEMATOCRIT  31.2*  30.4*   MCV  99.0*  99.0*   MCH  29.2  30.0   MCHC  29.5*  30.3*   RDW  52.6*  52.3*   PLATELETCT  170  149*   MPV  10.3  10.7     Recent Labs      07/05/19 0232 07/06/19 0222 07/07/19 0225   SODIUM  146*  147*  144   POTASSIUM  3.1*  3.4*  3.4*   CHLORIDE  97  99  98   CO2  43*  42*  43*   GLUCOSE  83  144*  128*   BUN  33*  32*  29*   CREATININE  1.41*  1.50*  1.39   CALCIUM  8.6  8.7  9.0                   Imaging  IR-PICC LINE PLACEMENT W/ GUIDANCE > AGE 5   Final Result                  Ultrasound-guided PICC placement performed by qualified nursing staff as    above.          US-RACHEL SINGLE LEVEL BILAT   Final Result      EC-ECHOCARDIOGRAM COMPLETE W/ CONT   Final Result      US-EXTREMITY VENOUS LOWER BILAT   Final Result      DX-SHOULDER 2+ RIGHT   Final Result      1.  No acute findings.      2.  Severe degenerative change in the right acromioclavicular joint.      DX-FOOT-COMPLETE 3+ RIGHT   Final Result      1.  Lucency in the right fifth toe, possibly representing osteomyelitis. If clinically indicated, MRI could be performed for confirmation.      2.  Soft tissue edema within the forefoot.      DX-FOOT-COMPLETE 3+ LEFT   Final Result      1.  No definitive evidence for osteomyelitis.      2.  Forefoot deformities as described.      DX-CHEST-PORTABLE (1 VIEW)   Final Result      Bilateral interstitial opacities may represent interstitial edema or pneumonitis.      Mild cardiomegaly.       Atherosclerotic plaque.              Assessment/Plan    Acute systolic CHF (congestive heart failure) (HCC)   Assessment & Plan    Echo reveals EF of 35%  New diagnosis  Secondary to ischemic cardiomyopathy with known severe CAD with multivessel disease.  Was deemed a poor surgical candidate.  Further invasive cardiac procedure or surgery would not change the overall mortality of this patient per cardiology    Optimize medical management.  Continue coreg, losartan, and torsemide.  Hold off on aldactone for now as BP is soft.        Diabetic foot infection (HCC)   Assessment & Plan    History of chronic diabetic wounds to bilateral feet.  Has been following with outpatient wound clinic  Recent worsening of wounds with purulent drainage.   He found to have possible right foot osteomyelitis on x-ray.  S/p metatarsal head resection of right 5th and left 2nd and 3rd.    OR culture of right fifth metatarsal +E faecalis  ID following.  Will need 6 weeks IV antibiotics   Continue Unasyn with a stop date 8/9/2019  LPS following.  Continue tight glycemic control.  Pending LTACH for antibiotics and wound care.        Hypernatremia   Assessment & Plan    Improved  Likely secondary to dehydration  Patient refusing to increase oral fluid intake as he does not like the thickener required for dysphagia.  Torsemide dose decreased.   Monitor bmp.      Hypokalemia   Assessment & Plan    Secondary to diuresis.  Continue daily replacement  Follow bmp.      Generalized weakness   Assessment & Plan    PT/OT following.  Pending SNF vs LTACH     Oropharyngeal dysphagia- (present on admission)   Assessment & Plan    -Patient has very weak cough during the day however patient said this is his baseline.  -Noticed significant risk of aspiration evaluated by bedside nurse, me, and speech therapist.  -Per speech therapist recommend patient to have fees test however patient refused.  -Further discussion about the risk of aspiration patient would  like to take the risk and eat for pleasure.  -Patient also wants to change CODE STATUS to DNR.  -Patient currently is competent to make medical decision.  We will respect patient wishes and start patient on safest diet currently as dysphagia II with nectar thick.     Continue modified diet to reduce relatively risk of aspiration     DNR (do not resuscitate)   Assessment & Plan    Patient is competent to make medical decision.  CODE STATUS remain on DNR     Cardiomyopathy (HCC)   Assessment & Plan    Ischemic cardiomyopathy secondary to severe CAD.  Not a candidate for surgical intervention per cardiology.  Continue aggressive medical management as above.      Venous stasis dermatitis of both lower extremities   Assessment & Plan    Secondary to chronic edema  Continue diuretics, changed to torsemide by cardiologist.  Skin care.      Macrocytosis   Assessment & Plan    TSH, vitamin B12 and folic acid wnl.     Elevated troponin   Assessment & Plan    He found to have mildly elevated troponin.  He denies any symptoms of chest pain.  EKG stable  Stress induced secondary to volume overload  Echo shows EF 35%.  Continue diuretics, changed to torsemide by cardiologist.         CKD (chronic kidney disease), stage III ([facility])- (present on admission)   Assessment & Plan    History of chronic kidney disease.  Currently renal functions are at the baseline.  Avoid nephrotoxins.  Continue to monitor  Kidney function remained stable.     CAD (coronary artery disease)- (present on admission)   Assessment & Plan    Hx of  Continue lipitor and ASA.      Mixed hyperlipidemia- (present on admission)   Assessment & Plan    Continue lipitor.      Edema- (present on admission)   Assessment & Plan    Chronic BLE edema.  Secondary to CHF.  Overall improved.   Continue torsemide.        COPD (chronic obstructive pulmonary disease) (formerly [facility])- (present on admission)   Assessment & Plan    Not in acute exacerbation.  Continue RT protocol, BT, and  supplemental o2.      Type 2 diabetes mellitus with kidney complication, without long-term current use of insulin (HCC)- (present on admission)   Assessment & Plan    A1c 10.3  BG better controlled on current regimen  Continue lantus and SSI.  Diabetic diet and accuchecks.      Essential hypertension, benign- (present on admission)   Assessment & Plan    Blood pressures are soft.  Continue losartan, coreg, and torsemide.                VTE prophylaxis: Heparin

## 2019-07-07 NOTE — PROGRESS NOTES
2 RN skin check complete with RY Gonsalez.   Devices in place: R upper arm PICC, PIV, ortho boots, tubi  stockings, condom cath, and nasal cannula.  Skin assessed under devices: skin intact.   Confirmed pressure ulcers found on: diabetic foot ulcers L and R plantar. Pressure injury to back of bilateral ears.   New potential pressure ulcers noted on: Generalized bruising and scabbing to upper and lower extremities. Scab noted to R cheek KEANU. Biotin dressings to L and R elbow, with small open area to R elbow. Abrasion to R knee, KEANU. Old surgical incision to L knee with biotin dressing in place. Bilateral shins jose carlos with weeping. Groin and scrotum red and excoriated. Sutures noted to back of heels bilaterally, well approximated, with biotin dressings in place. Wound to L plantar foot with hypafix tape and packed, and sutures to L dorsal foot below second metatarsal, well approximated and pink.  Wound to R plantar foot, wound bed red and pink and sutures to incision site on lateral foot, well approximated and pink. No signs of discharge. Gauze placed in between toes.  The following interventions in place: [id number], pillows for comfort, martha cream, oxygen offloaded to cheeks, wound following with orders for dressing changes, ortho boots in place, and incontinence care provided with linen changes.

## 2019-07-07 NOTE — CARE PLAN
Problem: Safety  Goal: Will remain free from injury  Outcome: PROGRESSING AS EXPECTED  Fall precautions in place. Bedrails up. Bed in lowest position and locked.  Call light and phone within reach. Patient educated on importance of calling nurses before getting out of bed, verbalizes understanding. Bed alarm on.     Problem: Venous Thromboembolism (VTW)/Deep Vein Thrombosis (DVT) Prevention:  Goal: Patient will participate in Venous Thrombosis (VTE)/Deep Vein Thrombosis (DVT)Prevention Measures  Outcome: PROGRESSING AS EXPECTED   07/06/19 2000   OTHER   Risk Assessment Score 3   VTE RISK High   Pharmacologic Prophylaxis Used Unfractionated Heparin

## 2019-07-08 PROBLEM — E87.6 HYPOKALEMIA: Status: RESOLVED | Noted: 2019-07-07 | Resolved: 2019-07-08

## 2019-07-08 LAB
ANION GAP SERPL CALC-SCNC: 6 MMOL/L (ref 0–11.9)
BUN SERPL-MCNC: 27 MG/DL (ref 8–22)
CALCIUM SERPL-MCNC: 8.7 MG/DL (ref 8.5–10.5)
CHLORIDE SERPL-SCNC: 98 MMOL/L (ref 96–112)
CO2 SERPL-SCNC: 43 MMOL/L (ref 20–33)
CREAT SERPL-MCNC: 1.34 MG/DL (ref 0.5–1.4)
GLUCOSE BLD-MCNC: 114 MG/DL (ref 65–99)
GLUCOSE BLD-MCNC: 133 MG/DL (ref 65–99)
GLUCOSE BLD-MCNC: 81 MG/DL (ref 65–99)
GLUCOSE SERPL-MCNC: 88 MG/DL (ref 65–99)
POTASSIUM SERPL-SCNC: 3.5 MMOL/L (ref 3.6–5.5)
SODIUM SERPL-SCNC: 147 MMOL/L (ref 135–145)

## 2019-07-08 PROCEDURE — 700102 HCHG RX REV CODE 250 W/ 637 OVERRIDE(OP): Performed by: NURSE PRACTITIONER

## 2019-07-08 PROCEDURE — 700111 HCHG RX REV CODE 636 W/ 250 OVERRIDE (IP): Performed by: INTERNAL MEDICINE

## 2019-07-08 PROCEDURE — 770006 HCHG ROOM/CARE - MED/SURG/GYN SEMI*

## 2019-07-08 PROCEDURE — A9270 NON-COVERED ITEM OR SERVICE: HCPCS | Performed by: INTERNAL MEDICINE

## 2019-07-08 PROCEDURE — 82962 GLUCOSE BLOOD TEST: CPT | Mod: 91

## 2019-07-08 PROCEDURE — 700102 HCHG RX REV CODE 250 W/ 637 OVERRIDE(OP): Performed by: INTERNAL MEDICINE

## 2019-07-08 PROCEDURE — 99232 SBSQ HOSP IP/OBS MODERATE 35: CPT | Performed by: INTERNAL MEDICINE

## 2019-07-08 PROCEDURE — 80048 BASIC METABOLIC PNL TOTAL CA: CPT

## 2019-07-08 PROCEDURE — A6213 FOAM DRG >16<=48 SQ IN W/BDR: HCPCS | Performed by: INTERNAL MEDICINE

## 2019-07-08 PROCEDURE — A9270 NON-COVERED ITEM OR SERVICE: HCPCS | Performed by: NURSE PRACTITIONER

## 2019-07-08 PROCEDURE — 700105 HCHG RX REV CODE 258: Performed by: INTERNAL MEDICINE

## 2019-07-08 PROCEDURE — 307059 PAD,EAR PROTECTOR: Performed by: INTERNAL MEDICINE

## 2019-07-08 RX ORDER — POTASSIUM CHLORIDE 20 MEQ/1
40 TABLET, EXTENDED RELEASE ORAL DAILY
Qty: 60 TAB | Refills: 11
Start: 2019-07-09

## 2019-07-08 RX ORDER — POTASSIUM CHLORIDE 20 MEQ/1
40 TABLET, EXTENDED RELEASE ORAL DAILY
Status: DISCONTINUED | OUTPATIENT
Start: 2019-07-08 | End: 2019-07-09 | Stop reason: HOSPADM

## 2019-07-08 RX ORDER — INSULIN GLARGINE 100 [IU]/ML
10 INJECTION, SOLUTION SUBCUTANEOUS EVERY EVENING
Qty: 10 ML
Start: 2019-07-08

## 2019-07-08 RX ORDER — TORSEMIDE 10 MG/1
50 TABLET ORAL DAILY
Qty: 30 TAB | Refills: 3
Start: 2019-07-09

## 2019-07-08 RX ORDER — LOSARTAN POTASSIUM 25 MG/1
25 TABLET ORAL DAILY
Qty: 30 TAB
Start: 2019-07-09

## 2019-07-08 RX ORDER — CARVEDILOL 6.25 MG/1
6.25 TABLET ORAL 2 TIMES DAILY WITH MEALS
Qty: 60 TAB
Start: 2019-07-08

## 2019-07-08 RX ADMIN — POTASSIUM CHLORIDE 20 MEQ: 20 TABLET, EXTENDED RELEASE ORAL at 06:20

## 2019-07-08 RX ADMIN — ATORVASTATIN CALCIUM 20 MG: 20 TABLET, FILM COATED ORAL at 06:20

## 2019-07-08 RX ADMIN — LOSARTAN POTASSIUM 25 MG: 25 TABLET ORAL at 06:20

## 2019-07-08 RX ADMIN — FAMOTIDINE 20 MG: 20 TABLET ORAL at 06:20

## 2019-07-08 RX ADMIN — AMPICILLIN SODIUM AND SULBACTAM SODIUM 3 G: 2; 1 INJECTION, POWDER, FOR SOLUTION INTRAMUSCULAR; INTRAVENOUS at 23:20

## 2019-07-08 RX ADMIN — AMPICILLIN SODIUM AND SULBACTAM SODIUM 3 G: 2; 1 INJECTION, POWDER, FOR SOLUTION INTRAMUSCULAR; INTRAVENOUS at 13:18

## 2019-07-08 RX ADMIN — HEPARIN SODIUM 5000 UNITS: 5000 INJECTION, SOLUTION INTRAVENOUS; SUBCUTANEOUS at 21:43

## 2019-07-08 RX ADMIN — ASPIRIN 81 MG 81 MG: 81 TABLET ORAL at 06:20

## 2019-07-08 RX ADMIN — POTASSIUM CHLORIDE 40 MEQ: 20 TABLET, EXTENDED RELEASE ORAL at 09:39

## 2019-07-08 RX ADMIN — INSULIN GLARGINE 10 UNITS: 100 INJECTION, SOLUTION SUBCUTANEOUS at 18:21

## 2019-07-08 RX ADMIN — TORSEMIDE 50 MG: 20 TABLET ORAL at 06:20

## 2019-07-08 RX ADMIN — HEPARIN SODIUM 5000 UNITS: 5000 INJECTION, SOLUTION INTRAVENOUS; SUBCUTANEOUS at 13:18

## 2019-07-08 RX ADMIN — AMPICILLIN SODIUM AND SULBACTAM SODIUM 3 G: 2; 1 INJECTION, POWDER, FOR SOLUTION INTRAMUSCULAR; INTRAVENOUS at 18:23

## 2019-07-08 RX ADMIN — AMPICILLIN SODIUM AND SULBACTAM SODIUM 3 G: 2; 1 INJECTION, POWDER, FOR SOLUTION INTRAMUSCULAR; INTRAVENOUS at 06:17

## 2019-07-08 RX ADMIN — HEPARIN SODIUM 5000 UNITS: 5000 INJECTION, SOLUTION INTRAVENOUS; SUBCUTANEOUS at 06:22

## 2019-07-08 ASSESSMENT — ENCOUNTER SYMPTOMS
HEADACHES: 0
DEPRESSION: 0
WEAKNESS: 1
SHORTNESS OF BREATH: 0
TINGLING: 0
FEVER: 0
CHILLS: 0
BLURRED VISION: 0
TREMORS: 0
SORE THROAT: 0
DIZZINESS: 0
ABDOMINAL PAIN: 0
MYALGIAS: 0
SPEECH CHANGE: 0
SENSORY CHANGE: 0
VOMITING: 0
CONSTIPATION: 0
NAUSEA: 0
DIARRHEA: 0
HEARTBURN: 0

## 2019-07-08 ASSESSMENT — LIFESTYLE VARIABLES: SUBSTANCE_ABUSE: 0

## 2019-07-08 NOTE — THERAPY
SPEECH THERAPY CONTACT NOTE:    Per RN and MD, Pt con't to receive D2/NTL diet despite aspiration risks. Pt to likely d/c to LTAC in [place]. Per discussion with MD, no further inpatient skilled SLP services are warranted at this time. SLP will not actively follow Pt; however, will remain available PRN for d/c planning/education. RN aware. Thank you.

## 2019-07-08 NOTE — DISCHARGE PLANNING
Agency/Facility Name: [facility]  Spoke To: Fax Transmission  Outcome: Patient declined due to care exceeding capacity.

## 2019-07-08 NOTE — CARE PLAN
Problem: Safety  Goal: Will remain free from falls  Outcome: PROGRESSING AS EXPECTED  Patient will remain free from falls for duration of shift. He is a high fall risk and has all appropriate precautions in place. RN charted near pt room.    Problem: Respiratory:  Goal: Respiratory status will improve  Outcome: PROGRESSING AS EXPECTED  Patient is currently at baseline of 3 LPM of O2. No complaints of SOB.

## 2019-07-08 NOTE — DISCHARGE PLANNING
Received Transport Form at 1410  Spoke to Julieth at [facility]   Transport is scheduled for tomorrow 7/9/19 at 1100 going to [address] [place] [address], [id number].  Floor 2.

## 2019-07-08 NOTE — PROGRESS NOTES
2 RN skin check complete with RY salmeron  Devices in place: bilateral off loading boot, tubigrip, nasal cannula, condom cath (removed during skin check).  Skin assessed under devices: yes.  Confirmed pressure ulcers found on: R ear per previous documentation this has already been confirmed.  New potential pressure ulcers noted on: possible DTI L upper cheek bone, L inner ankle bone with possible new DTI, L outer ankle/lateral shin DTI, top of L great toe possible stg one PU, possible new PU to coccyx from area of possible non blanching redness.  Wound consult placed: yes  The following interventions in place: waffle overlay, Q2 turns, padded areas with mepilex lite or mepilex that are potential new PU/DTI or blanching redness, padded O2 tubing, removed condom cath for now, linens kept clean and dry, barrier paste to groin and coccyx. Off loading boots reapplied post padding areas of concern.     Blanching redness noted to bilateral elbows with a scab to the L elbow- mepilex placed on both, PU to R ear and blanching redness to L - mepilex lite placed over tubing, redness from incontinence to groin and barrier paste applied, redness and peeling of skin and possible new PU to coccyx- barrier paste applied as patient incontinent, L knee with old tear which is now intact and adhesive foam in place, R knee with black scab, blanching redness to bilateral outer ankles- mepilex lite applied for prevention, incision CDI to posterior bilateral lower legs and possible DTI to outer L shin/ankle as noted above, wounds to dorsal bilateral feet changed, blanching redness noted to top of L foot next to incision- mepilex placed for prevention.

## 2019-07-08 NOTE — DISCHARGE SUMMARY
Discharge Summary    CHIEF COMPLAINT ON ADMISSION  Chief Complaint   Patient presents with   • Foot Swelling     BL LE swelling and edema w wounds to feet bilaterally   • Shoulder Pain     Pt states he fell one or two days ago and is having right shoulder pain   • Fall   • Barky Cough     Pt 88% on room air w/ ems and requiring 2L NC to maintain saturations over 90%       Reason for Admission  ems     Admission Date  6/25/2019    CODE STATUS  DNAR/DNI    HPI & HOSPITAL COURSE  This is a 79 y.o. male with a past medical history of COPD, CAD, HTN, DM, HLD.  Patient follows with outpatient wound care for chronic, nonhealing bilateral diabetic foot wounds.  Presented to ED after developing worsening erythema and swelling of bilateral lower extremities as well as purulent drainage from foot wounds.  He was initiated on antibiotics.  Infectious disease and limb preservation services were consulted.  He underwent metatarsal head resection of right 5th and left 2nd and 3rd on 6/28/2019.  His OR culture was positive for E. Faecalis.  Per infectious disease, he will need to complete 6 weeks of IV Unasyn with a stop date of 8/9/2019.  A PICC line has been placed.     His hemoglobin A1c was found to be 10.3.  Due to his infection and nonhealing wounds, he is recommended to maintain tight glycemic control.  He initiated on Lantus 10 mg daily with sliding scale insulin.  He achieved adequate glycemic control with this regimen.    Over his hospital stay he was also found to have a new diagnosis of CHF with EF of 35% on echocardiogram.  He was noted to have mild volume overload on admission with BLE edema, improved with diuresis.  He was evaluated by cardiology.  His new diagnosis of heart failure is suspected to be secondary to ischemic cardiomyopathy with known severe CAD with multivessel disease.  Per cardiology, he is a poor surgical candidate secondary to his underlying comorbidities.  He will continue aggressive medical  management.  He was initiated on Torsemide 100 mg daily.  This was reduced to 50 mg daily secondary to fluctuating hypernatremia, suspected to be related to his poor oral fluid intake.  He is also maintained on Coreg and Losartan.  Aldactone is currently being held as his blood pressures currently run soft.     He is also noted to have underlying dysphagia, although he refuses FEES for aspiration evaluation.  The patient wishes to eat despite aspiration risk.  He understands these risks and continues to demand an oral diet.  The physician had extensive discussion of overall prognosis with family and patient.  The decision was made to transition to DNR status.          At this time the patient is medically stable.  He will transition to [facility] in [place] for continued IV antibiotics, wound care, and therapy.  He will need to follow closely with cardiology at discharge.  He can follow with orthopedic surgery and infectious disease as needed.  He is safe for transfer at this time.       Therefore, he is discharged in fair and stable condition to a long-term acute care hospital.    The patient met 2-midnight criteria for an inpatient stay at the time of discharge.    Discharge Date  7/9/2019    DISCHARGE DIAGNOSES  Active Problems:    Diabetic foot infection (HCC) POA: Unknown    Acute systolic CHF (congestive heart failure) ([facility]) POA: Unknown    Hypernatremia POA: Unknown    Essential hypertension, benign POA: Yes    Type 2 diabetes mellitus with kidney complication, without long-term current use of insulin ([facility]) POA: Yes    COPD (chronic obstructive pulmonary disease) ([facility]) POA: Yes    Edema POA: Yes    Mixed hyperlipidemia POA: Yes    CAD (coronary artery disease) POA: Yes    CKD (chronic kidney disease), stage III ([facility]) POA: Yes    Elevated troponin POA: Unknown    Macrocytosis POA: Unknown    Venous stasis dermatitis of both lower extremities POA: Unknown    Cardiomyopathy (HCC) POA: Unknown    DNR (do not  resuscitate) POA: Unknown    Oropharyngeal dysphagia POA: Yes    Generalized weakness POA: Unknown    Hypokalemia POA: Unknown  Resolved Problems:    * No resolved hospital problems. *      FOLLOW UP  Future Appointments  Date Time Provider Department Center   7/19/2019 8:00 AM Tomas Venegas M.D. [facility]   8/8/2019 12:40 PM Destiny Chaudhry P.A.-C. [facility] None       MEDICATIONS ON DISCHARGE     Medication List      ASK your doctor about these medications      Instructions   aspirin 81 MG tablet   Take 81 mg by mouth every day.  Dose:  81 mg     atorvastatin 20 MG Tabs  Commonly known as:  LIPITOR   Take 1 Tab by mouth every day.  Dose:  20 mg     carvedilol 3.125 MG Tabs  Commonly known as:  COREG   Take 1 Tab by mouth 2 times a day, with meals.  Dose:  3.125 mg     cephALEXin 500 MG Caps  Commonly known as:  KEFLEX   Take 1,000 mg by mouth 2 times a day.  Dose:  1000 mg     famotidine 20 MG Tabs  Commonly known as:  PEPCID   Take 20 mg by mouth every day.  Dose:  20 mg     furosemide 40 MG Tabs  Commonly known as:  LASIX   Take 1 Tab by mouth every day.  Dose:  40 mg     metFORMIN  MG Tb24  Commonly known as:  GLUCOPHAGE XR   Take 750 mg by mouth 2 times a day.  Dose:  750 mg     potassium chloride SA 10 MEQ Tbcr  Commonly known as:  K-DUR   Take 10 mEq by mouth every day.  Dose:  10 mEq            Allergies  Allergies   Allergen Reactions   • Other Misc Rash     Cat Hair       DIET  Orders Placed This Encounter   Procedures   • Diet Order Diabetic     Standing Status:   Standing     Number of Occurrences:   1     Order Specific Question:   Diet:     Answer:   Diabetic [3]     Order Specific Question:   Texture/Fiber modifications:     Answer:   Dysphagia 2(Pureed/Chopped)specify fluid consistency(question 6) [2]     Order Specific Question:   Consistency/Fluid modifications:     Answer:   Nectar Thick [2]       ACTIVITY  As tolerated.  Weight bearing as tolerated    CONSULTATIONS  Infectious disease  - Dr. Moreau.   Orthopedic surgery - Dr. Cooper  Cardiology - Dr. Olivier  Limb preservation services.    PROCEDURES  6/28/2019:  1.  Bilateral tendo-Achilles lengthening.  2.  Bilateral second, third, fourth and fifth percutaneous flexor tenotomies.  3.  Right fifth metatarsal head excision.  4.  Left second metatarsal head excision.  5.  Left third metatarsal head excision.  6.  Irrigation and debridement of skin, subcutaneous tissue to fascia, left   lower extremity.  7.  Irrigation and debridement of skin, subcutaneous tissue to bone, right   lower extremity.    LABORATORY  Lab Results   Component Value Date    SODIUM 147 (H) 07/08/2019    POTASSIUM 3.5 (L) 07/08/2019    CHLORIDE 98 07/08/2019    CO2 43 (HH) 07/08/2019    GLUCOSE 88 07/08/2019    BUN 27 (H) 07/08/2019    CREATININE 1.34 07/08/2019        Lab Results   Component Value Date    WBC 6.8 07/06/2019    HEMOGLOBIN 9.2 (L) 07/06/2019    HEMATOCRIT 30.4 (L) 07/06/2019    PLATELETCT 149 (L) 07/06/2019        Total time of the discharge process was 35 minutes.

## 2019-07-08 NOTE — DISCHARGE PLANNING
Agency/Facility Name: [facility]  Spoke To: Tracy  Outcome: Per Tracy: I will look for (Liaison) [place], new number and call you back with it.    Resent referral.

## 2019-07-08 NOTE — DISCHARGE PLANNING
Agency/Facility Name: [facility]  Spoke To: Saul ( Liaison)  Outcome: Referral under review. Per Saul: I will make a time to come visit patient.    @ 1200 Saul came for a visit. Patient is accepted.

## 2019-07-08 NOTE — PROGRESS NOTES
2 RN skin check complete with RY Collier.   Devices in place R upper arm PICC, PIV, ortho boots, tubi  stockings,nasal cannula.  Skin assessed under devices skin intact.  Confirmed pressure ulcers found on diabetic foot ulcers L and R plantar. Pressure injury to back of bilateral ears.  New potential pressure ulcers noted on Generalized bruising and scabbing to upper and lower extremities. Biotin dressings to L and R elbow with small open area to R elbow. Abrasion to R knee. Old surgical incision to L knee with biotin dressings in place. Wounds on left and right plantar area of foot were not assessed, dressings in place. Dressings are to be changed daily.   The following interventions in place Q2 turns, pillows for comfort, martha cream, oxygen offloaded to cheeks, wound following with orders for dressing changes, ortho boots in place and incontinence care provided with linen changes.

## 2019-07-08 NOTE — DISCHARGE PLANNING
Anticipated Discharge Disposition: LTAC    Action: Discussed patient's needs during IDT rounds, patient is medically clear to be d/c.   PC to [facility]ia[facility], Saul George [phone]: SW relayed above information to him.  Saul stated that he will need the d/c summary prior to admit tomorrow, fax to [phone].  JOSSUE asked if today Dr can speak to the accepting Dr.  Saul stated yes, provided name of accepting Dr. Ellsworth and his phone number.  Patient can be sent at 12:00.  Brickeys Text to NASEEM [place]terro[place] with name and number of accepting DR    Barriers to Discharge: n/a    Plan: set up transport, inform patient/spouse

## 2019-07-08 NOTE — PROGRESS NOTES
[facility] Medicine Daily Progress Note    Date of Service  7/8/2019    Chief Complaint  79 y.o. male admitted 6/25/2019 with BLE swelling and diabetic foot infection.    Hospital Course    H/O COPD, CAD, HTN, DM, HLD.  Patient follows with outpatient wound care for chronic, nonhealing bilateral diabetic foot wounds.  Presented to ED after developing worsening erythema and swelling of BLE as well as purulent drainage from foot wounds.  Started on antibiotics.  ID and LPS consulted.  S/P metatarsal head resection of right 5th and left 2nd and 3rd on 6/28. OR cx positive for E. Faecalis.  Will need 6 weeks of IV antibiotics.  Also found to have a new diagnosis of CHF with EF of 35% on echo.  Mild volume overload on admission with BLE edema, improved with diuresis.  Evaluated by cardiology.  Heart failure suspected to be secondary to ischemic cardiomyopathy with known severe CAD with multivessel disease.  Poor surgical candidate secondary to comorbidities.  Continue medical management.  He is also noted to have significant dysphagia, although he refuses FEES for aspiration evaluation.  Patient wishes to eat despite aspiration risk.  Physician had extensive discussion of overall prognosis with family and patient.  Decision was made to transition to DNR status.          Interval Problem Update  7/5:  PICC in place.  Denies pain.  Anxious to discharge to skilled.  BLE edema improving.  Bilateral offloading boots in place.  BP is soft.  Denies lightheadedness, dizziness, or shortness of breath.  BG well controlled.   7/6:  Na 147.  Encourage adequate daily fluid intake.  Denies pain or discomfort.  Volume status stable.  VSS.  7/7:  Na 145.  Patient resting comfortably in bed with no acute needs.  VSS.  Encouraged to be out of bed daily.   7/8:  Na 147.  Continue to encourage oral fluid intake.  Otherwise pain is controlled.   No acute needs.       Consultants/Specialty  ID  LPS  Cardiology    Code  Status  DNR    Disposition  Has been accepted to [facility] in [place].  Awaiting transfer.  CM following.     Review of Systems  Review of Systems   Constitutional: Negative for chills and fever.   HENT: Negative for congestion and sore throat.    Eyes: Negative for blurred vision.   Respiratory: Negative for shortness of breath.    Cardiovascular: Positive for leg swelling. Negative for chest pain.   Gastrointestinal: Negative for abdominal pain, constipation, diarrhea, heartburn, nausea and vomiting.   Genitourinary: Negative for dysuria.   Musculoskeletal: Negative for myalgias.   Skin: Negative for itching and rash.   Neurological: Positive for weakness. Negative for dizziness, tingling, tremors, sensory change, speech change and headaches.   Psychiatric/Behavioral: Negative for depression and substance abuse.        Physical Exam  Temp:  [36.4 °C (97.5 °F)-36.7 °C (98.1 °F)] 36.4 °C (97.5 °F)  Pulse:  [70-73] 70  Resp:  [16-18] 17  BP: ()/(48-82) 95/58  SpO2:  [92 %-99 %] 98 %    Physical Exam   Constitutional: He is oriented to person, place, and time. He appears well-developed and well-nourished.   Obese male.    HENT:   Head: Normocephalic and atraumatic.   Eyes: Conjunctivae are normal. Right eye exhibits no discharge. Left eye exhibits no discharge.   Neck: Normal range of motion. No tracheal deviation present.   Cardiovascular: Normal rate and regular rhythm.    No murmur heard.  BLE edema   Pulmonary/Chest: Effort normal. No stridor. No respiratory distress. He has decreased breath sounds in the right lower field and the left lower field. He has no wheezes.   Abdominal: Soft. Bowel sounds are normal. He exhibits no distension. There is no tenderness. There is no rebound.   Musculoskeletal: He exhibits edema. He exhibits no tenderness.   Generalized weakness     Neurological: He is alert and oriented to person, place, and time.   Skin: Skin is warm and dry. He is not diaphoretic.   Stasis dermatitis  noted to BLE   Surgical wounds to bilateral feet with dressings intact    Psychiatric: He has a normal mood and affect.   Nursing note and vitals reviewed.      Fluids    Intake/Output Summary (Last 24 hours) at 07/08/19 1143  Last data filed at 07/08/19 0930   Gross per 24 hour   Intake              820 ml   Output              400 ml   Net              420 ml       Laboratory  Recent Labs      07/06/19   0222   WBC  6.8   RBC  3.07*   HEMOGLOBIN  9.2*   HEMATOCRIT  30.4*   MCV  99.0*   MCH  30.0   MCHC  30.3*   RDW  52.3*   PLATELETCT  149*   MPV  10.7     Recent Labs      07/06/19   0222 07/07/19   0225  07/08/19   0305   SODIUM  147*  144  147*   POTASSIUM  3.4*  3.4*  3.5*   CHLORIDE  99  98  98   CO2  42*  43*  43*   GLUCOSE  144*  128*  88   BUN  32*  29*  27*   CREATININE  1.50*  1.39  1.34   CALCIUM  8.7  9.0  8.7                   Imaging  IR-PICC LINE PLACEMENT W/ GUIDANCE > AGE 5   Final Result                  Ultrasound-guided PICC placement performed by qualified nursing staff as    above.          US-RACHEL SINGLE LEVEL BILAT   Final Result      EC-ECHOCARDIOGRAM COMPLETE W/ CONT   Final Result      US-EXTREMITY VENOUS LOWER BILAT   Final Result      DX-SHOULDER 2+ RIGHT   Final Result      1.  No acute findings.      2.  Severe degenerative change in the right acromioclavicular joint.      DX-FOOT-COMPLETE 3+ RIGHT   Final Result      1.  Lucency in the right fifth toe, possibly representing osteomyelitis. If clinically indicated, MRI could be performed for confirmation.      2.  Soft tissue edema within the forefoot.      DX-FOOT-COMPLETE 3+ LEFT   Final Result      1.  No definitive evidence for osteomyelitis.      2.  Forefoot deformities as described.      DX-CHEST-PORTABLE (1 VIEW)   Final Result      Bilateral interstitial opacities may represent interstitial edema or pneumonitis.      Mild cardiomegaly.      Atherosclerotic plaque.              Assessment/Plan    Acute systolic CHF (congestive  heart failure) (HCC)   Assessment & Plan    Echo reveals EF of 35%  New diagnosis  Secondary to ischemic cardiomyopathy with known severe CAD with multivessel disease.  Was deemed a poor surgical candidate.  Further invasive cardiac procedure or surgery would not change the overall mortality of this patient per cardiology    Optimize medical management.  Continue coreg, losartan, and torsemide.  Hold off on aldactone for now as BP is soft.        Diabetic foot infection (HCC)   Assessment & Plan    History of chronic diabetic wounds to bilateral feet.  Has been following with outpatient wound clinic  Recent worsening of wounds with purulent drainage.   He found to have possible right foot osteomyelitis on x-ray.  S/p metatarsal head resection of right 5th and left 2nd and 3rd.    OR culture of right fifth metatarsal +E faecalis  ID following.  Will need 6 weeks IV antibiotics   Continue Unasyn with a stop date 8/9/2019  LPS following.  Continue tight glycemic control.  Pending LTACH for antibiotics and wound care.        Hypernatremia   Assessment & Plan    Fluctuating   Likely secondary to poor oral fluid intake.   Patient refusing to increase oral fluid intake as he does not like the thickener required for dysphagia.  Continue decreased torsemide dose  Monitor bmp.      Hypokalemia   Assessment & Plan    Secondary to diuresis.  Continue daily replacement  Follow bmp.      Generalized weakness   Assessment & Plan    PT/OT following.  Pending SNF vs LTACH     Oropharyngeal dysphagia- (present on admission)   Assessment & Plan    -Patient has very weak cough during the day however patient said this is his baseline.  -Noticed significant risk of aspiration evaluated by bedside nurse, me, and speech therapist.  -Per speech therapist recommend patient to have fees test however patient refused.  -Further discussion about the risk of aspiration patient would like to take the risk and eat for pleasure.  -Patient also wants  to change CODE STATUS to DNR.  -Patient currently is competent to make medical decision.  We will respect patient wishes and start patient on safest diet currently as dysphagia II with nectar thick.     Continue modified diet to reduce relatively risk of aspiration     DNR (do not resuscitate)   Assessment & Plan    Patient is competent to make medical decision.  CODE STATUS remain on DNR     Cardiomyopathy (HCC)   Assessment & Plan    Ischemic cardiomyopathy secondary to severe CAD.  Not a candidate for surgical intervention per cardiology.  Continue aggressive medical management as above.      Venous stasis dermatitis of both lower extremities   Assessment & Plan    Secondary to chronic edema  Continue diuretics, changed to torsemide by cardiologist.  Skin care.      Macrocytosis   Assessment & Plan    TSH, vitamin B12 and folic acid wnl.     Elevated troponin   Assessment & Plan    He found to have mildly elevated troponin.  He denies any symptoms of chest pain.  EKG stable  Stress induced secondary to volume overload  Echo shows EF 35%.  Continue diuretics, changed to torsemide by cardiologist.         CKD (chronic kidney disease), stage III ([facility])- (present on admission)   Assessment & Plan    History of chronic kidney disease.  Currently renal functions are at the baseline.  Avoid nephrotoxins.  Continue to monitor  Kidney function remained stable.     CAD (coronary artery disease)- (present on admission)   Assessment & Plan    Hx of  Continue lipitor and ASA.      Mixed hyperlipidemia- (present on admission)   Assessment & Plan    Continue lipitor.      Edema- (present on admission)   Assessment & Plan    Chronic BLE edema.  Secondary to CHF.  Overall improved.   Continue torsemide.        COPD (chronic obstructive pulmonary disease) ([facility])- (present on admission)   Assessment & Plan    Not in acute exacerbation.  Continue RT protocol, BT, and supplemental o2.      Type 2 diabetes mellitus with kidney  complication, without long-term current use of insulin (HCC)- (present on admission)   Assessment & Plan    A1c 10.3  BG better controlled on current regimen  Continue lantus and SSI.  Diabetic diet and accuchecks.      Essential hypertension, benign- (present on admission)   Assessment & Plan    Blood pressures are soft.  Continue losartan, coreg, and torsemide.                VTE prophylaxis: Heparin

## 2019-07-08 NOTE — PROGRESS NOTES
Patient A/Ox3 and non-ambulatory for shift. Pt has multiple wounds on BLE and has off-loading boots on even while in bed. He is on 3 LPM of O2 which is his baseline. PICC to R upper extremity has good blood return and is infusing NS at TKO. Condom cath in place to help with moisture containment. No complaints of pain. He is considered a high fall risk and has all appropriate precautions in place. Bed in low and locked position. Compliant with medications. No signs of distress.

## 2019-07-09 VITALS
OXYGEN SATURATION: 98 % | DIASTOLIC BLOOD PRESSURE: 53 MMHG | BODY MASS INDEX: 31.05 KG/M2 | RESPIRATION RATE: 18 BRPM | SYSTOLIC BLOOD PRESSURE: 104 MMHG | HEART RATE: 68 BPM | HEIGHT: 72 IN | WEIGHT: 229.28 LBS | TEMPERATURE: 97.4 F

## 2019-07-09 LAB — GLUCOSE BLD-MCNC: 114 MG/DL (ref 65–99)

## 2019-07-09 PROCEDURE — 700102 HCHG RX REV CODE 250 W/ 637 OVERRIDE(OP): Performed by: INTERNAL MEDICINE

## 2019-07-09 PROCEDURE — 82962 GLUCOSE BLOOD TEST: CPT

## 2019-07-09 PROCEDURE — 99239 HOSP IP/OBS DSCHRG MGMT >30: CPT | Performed by: INTERNAL MEDICINE

## 2019-07-09 PROCEDURE — 700111 HCHG RX REV CODE 636 W/ 250 OVERRIDE (IP): Performed by: INTERNAL MEDICINE

## 2019-07-09 PROCEDURE — A9270 NON-COVERED ITEM OR SERVICE: HCPCS | Performed by: INTERNAL MEDICINE

## 2019-07-09 PROCEDURE — 700102 HCHG RX REV CODE 250 W/ 637 OVERRIDE(OP): Performed by: NURSE PRACTITIONER

## 2019-07-09 PROCEDURE — A9270 NON-COVERED ITEM OR SERVICE: HCPCS | Performed by: NURSE PRACTITIONER

## 2019-07-09 PROCEDURE — 700105 HCHG RX REV CODE 258: Performed by: INTERNAL MEDICINE

## 2019-07-09 PROCEDURE — 99232 SBSQ HOSP IP/OBS MODERATE 35: CPT | Performed by: INTERNAL MEDICINE

## 2019-07-09 RX ADMIN — ASPIRIN 81 MG 81 MG: 81 TABLET ORAL at 05:14

## 2019-07-09 RX ADMIN — LOSARTAN POTASSIUM 25 MG: 25 TABLET ORAL at 05:15

## 2019-07-09 RX ADMIN — FAMOTIDINE 20 MG: 20 TABLET ORAL at 05:15

## 2019-07-09 RX ADMIN — CARVEDILOL 6.25 MG: 6.25 TABLET, FILM COATED ORAL at 08:24

## 2019-07-09 RX ADMIN — HEPARIN SODIUM 5000 UNITS: 5000 INJECTION, SOLUTION INTRAVENOUS; SUBCUTANEOUS at 05:15

## 2019-07-09 RX ADMIN — TORSEMIDE 50 MG: 20 TABLET ORAL at 05:14

## 2019-07-09 RX ADMIN — AMPICILLIN SODIUM AND SULBACTAM SODIUM 3 G: 2; 1 INJECTION, POWDER, FOR SOLUTION INTRAMUSCULAR; INTRAVENOUS at 05:14

## 2019-07-09 RX ADMIN — ATORVASTATIN CALCIUM 20 MG: 20 TABLET, FILM COATED ORAL at 05:14

## 2019-07-09 RX ADMIN — POTASSIUM CHLORIDE 40 MEQ: 20 TABLET, EXTENDED RELEASE ORAL at 05:16

## 2019-07-09 ASSESSMENT — ENCOUNTER SYMPTOMS
FEVER: 0
COUGH: 0
NAUSEA: 0
VOMITING: 0
MYALGIAS: 0
CHILLS: 0
DIARRHEA: 0
SPUTUM PRODUCTION: 0
SHORTNESS OF BREATH: 0
ABDOMINAL PAIN: 0

## 2019-07-09 NOTE — PROGRESS NOTES
Infectious Disease Progress Note    Author: Klely Guadarrama M.D. Date & Time of service: 2019  8:39 AM       Chief Complaint:  Bilateral diabetic foot wounds/osteomyelitis     Interval History:  79-year-old male admitted on 2019 due to worsening bilateral foot wounds and worsening CHF.  Patient found to have bilateral lower extremity osteomyelitis.     -AF, WBC 5.5, no issue with antibiotics, denies pain, denies being short of breath.   Afebrile, white count 7000.  Patient tolerating antibiotics, no new issues.  -AF, no WBC, no issue with antibiotics, denies pain stating he feels just fine, complaining that he has not been able to get up and walk around.  7/3-AF, WBC 6.0, tolerating antibiotics, denies pain, agreeable to SNF placement.   afebrile WBC 7.5 patient transferred to see her tower overnight.  He denies any bilateral lower extremity pain.  No shortness of breath or cough.    afebrile WBC 6.8 patient sleeping but arousable.  He denies any complaints and feels well today.  Pending [facility]      Review of Systems:  Review of Systems   Constitutional: Negative for chills and fever.   Respiratory: Negative for cough, sputum production and shortness of breath.    Gastrointestinal: Negative for abdominal pain, diarrhea, nausea and vomiting.   Musculoskeletal: Negative for joint pain and myalgias.       Hemodynamics:  Temp (24hrs), Av.6 °C (97.9 °F), Min:36.3 °C (97.4 °F), Max:36.8 °C (98.2 °F)  Temperature: 36.3 °C (97.4 °F)  Pulse  Av.2  Min: 56  Max: 105   Blood Pressure : 104/53       Physical Exam:  Physical Exam   Constitutional: He appears well-developed and well-nourished.   HENT:   Head: Normocephalic and atraumatic.   Eyes: Pupils are equal, round, and reactive to light. Conjunctivae and EOM are normal.   Cardiovascular: Normal rate, regular rhythm and normal heart sounds.    Pulmonary/Chest: Effort normal and breath sounds normal.   Abdominal: Soft. Bowel sounds  are normal.   Musculoskeletal:   Legs bandaged and in boots   Neurological: He is alert.   Skin: Skin is warm and dry.   Psychiatric: His behavior is normal.       Meds:    Current Facility-Administered Medications:   •  potassium chloride SA  •  torsemide  •  ampicillin-sulbactam (UNASYN) IV  •  insulin glargine  •  carvedilol  •  losartan  •  insulin regular **AND** Accu-Chek ACHS **AND** NOTIFY MD and PharmD **AND** glucose **AND** dextrose 10% bolus  •  Respiratory Care per Protocol  •  senna-docusate **AND** polyethylene glycol/lytes **AND** magnesium hydroxide **AND** bisacodyl  •  heparin  •  acetaminophen  •  albuterol  •  aspirin  •  atorvastatin  •  famotidine    Labs:  No results for input(s): WBC, RBC, HEMOGLOBIN, HEMATOCRIT, MCV, MCH, RDW, PLATELETCT, MPV, NEUTSPOLYS, LYMPHOCYTES, MONOCYTES, EOSINOPHILS, BASOPHILS, RBCMORPHOLO in the last 72 hours.  Recent Labs      07/07/19 0225  07/08/19   0305   SODIUM  144  147*   POTASSIUM  3.4*  3.5*   CHLORIDE  98  98   CO2  43*  43*   GLUCOSE  128*  88   BUN  29*  27*     Recent Labs      07/07/19 0225 07/08/19   0305   CREATININE  1.39  1.34       Imaging:  Dx-chest-portable (1 View)    Result Date: 6/25/2019 6/25/2019 1:45 PM HISTORY/REASON FOR EXAM:  Shortness of Breath. TECHNIQUE/EXAM DESCRIPTION AND NUMBER OF VIEWS: Single portable view of the chest. COMPARISON: 9/11/2014 FINDINGS: The heart is not enlarged. Atherosclerotic calcification is seen. There are interstitial opacities most prominent at the lung bases. No pleural effusion or pneumothorax is identified.     Bilateral interstitial opacities may represent interstitial edema or pneumonitis. Mild cardiomegaly. Atherosclerotic plaque.     Dx-foot-complete 3+ Right    Result Date: 6/25/2019 6/25/2019 1:45 PM HISTORY/REASON FOR EXAM:  Atraumatic Pain/Swelling/Deformity; Rule out osteomyelitis distal metatarsals 4 and 5 TECHNIQUE/EXAM DESCRIPTION AND NUMBER OF VIEWS: 3 nonweightbearing views of the  RIGHT foot. COMPARISON:  None FINDINGS:  No acute fracture is identified. There are claw toe deformities in the toes. There is a soft tissue wound in the lateral forefoot at the level of the fifth MTP joint. There is lucency in the fifth toe, possibly representing osteomyelitis. There is severe forefoot edema.     1.  Lucency in the right fifth toe, possibly representing osteomyelitis. If clinically indicated, MRI could be performed for confirmation. 2.  Soft tissue edema within the forefoot.    Dx-foot-complete 3+ Left    Result Date: 2019 1:45 PM HISTORY/REASON FOR EXAM:  Atraumatic Pain/Swelling/Deformity; Rule out osteomyelitis, distal metatarsals Foot pain. Evaluate for osteomyelitis TECHNIQUE/EXAM DESCRIPTION AND NUMBER OF VIEWS: 3 nonweightbearing views of the LEFT foot. COMPARISON:  None FINDINGS:  No definite bony destruction is identified. There are claw toe deformities. There is subluxation of the first proximal phalanx at the MTP joint. There is forefoot edema.     1.  No definitive evidence for osteomyelitis. 2.  Forefoot deformities as described.    Dx-shoulder 2+ Right    Result Date: 2019 2:33 PM HISTORY/REASON FOR EXAM:  Pain/Deformity Following Trauma TECHNIQUE/EXAM DESCRIPTION AND NUMBER OF VIEWS:  3 views of the RIGHT shoulder. COMPARISON: None FINDINGS: No acute fracture or dislocation. There is severe degenerative change in the right acromioclavicular joint. Soft tissues are unremarkable.     1.  No acute findings. 2.  Severe degenerative change in the right acromioclavicular joint.    Us-julieta Single Level Bilat    Result Date: 2019   Vascular Laboratory  Conclusions  There is no evidence of arterial disease demonstrated on the right side.  Evidence of calcified arteries on the left side make assessment unreliable.  TATYANA AMIN  Age:    79    Gender:     M  MRN:    [id number]  :    1940      BSA:  Exam Date:     2019 11:23  Room #:     Inpatient   Priority:     Routine  Ht (in):             Wt (lb):  Ordering Physician:        ODILON CAROLINA  Referring Physician:       ODILON CAROLINA  Sonographer:               Jackie Wiggins RVT  Study Type:                Complete Bilateral  Technical Quality:         Adequate  Indications:     Type 2 diabetes mellitus with foot ulcer  CPT Codes:       71415  ICD Codes:       E11.621  History:         Ulcerations on the bottom of both feet for 7 years. No prior                    exam.  Limitations:                 RIGHT  Waveform            Systolic BPs (mmHg)                             132           Brachial  Biphasic                                 Common Femoral  Triphasic                  156           Posterior Tibial  Biphasic                   155           Dorsalis Pedis                                           Peroneal                             1.18          RACHEL                                           TBI                       LEFT  Waveform        Systolic BPs (mmHg)                             125           Brachial  Biphasic                                 Common Femoral  Biphasic                   135           Posterior Tibial  Biphasic                   170           Dorsalis Pedis                                           Peroneal                             1.29          RACHEL                                           TBI  Findings  Right.  There is no evidence of arterial disease demonstrated on the right side.  Toe brachial index is normal; 0.83.  Doppler waveforms of the common femoral, popliteal, and dorsalis pedis  arteries are of high amplitude and biphasic.  Doppler waveform of the posterior tibial artery is triphasic.  Left.  Evidence of calcified arteries on the left side make assessment unreliable.  Toe brachial index shows evidence of calcified arteries; 1.18.  Doppler waveforms of the common femoral, popliteal, and posterior tibial  arteries are of high amplitude and biphasic.  Doppler waveform  of the dorsalis pedis artery is triphasic.  Ryann Chaudhry M.D.  (Electronically Signed)  Final Date:      2019                   13:01    Us-extremity Venous Lower Bilat    Result Date: 2019   Vascular Laboratory  CONCLUSIONS  No evidence of deep venous thrombosis of the legs.  No prior study is available for comparison.  CHERISEYUDYTATYANA  Exam Date:     2019 07:49  Room #:     Inpatient  Priority:     Routine  Ht (in):             Wt (lb):  Ordering Physician:        JORI AGUERO  Referring Physician:       [id number] Valentine  Sonographer:               Jackie Wiggins RVT  Study Type:                Complete Bilateral  Technical Quality:         Adequate  Age:    79    Gender:     M  MRN:    [id number]  :    1940      BSA:  Indications:     Localized swelling, mass and lump, lower limb, bilateral  CPT Codes:       26821  ICD Codes:       R22.43  History:         Swelling and edema of both legs with wounds of the feet. No                   prior duplex.  Limitations:  PROCEDURES:  Bilateral lower extremity venous duplex imaging.  The following venous structures were evaluated: common femoral, profunda  femoral, proximal portion of the greater saphenous, femoral, popliteal,  peroneal and posterior tibial veins.  Serial compression, augmentation maneuvers, color and spectral Doppler flow  evaluations were performed.  FINDINGS:  Bilateral lower extremities -  No evidence of deep venous thrombosis.  Complete color filling and compressibility with normal venous flow dynamics  including spontaneous flow, response to augmentation maneuvers, and  respiratory phasicity.  Ryann Chaudhry M.D.  (Electronically Signed)  Final Date:      2019                   11:00    Ec-echocardiogram Complete W/ Cont    Result Date: 2019  Transthoracic Echo Report Echocardiography Laboratory CONCLUSIONS Prior echo on 17, the function has declined. Left  ventricular ejection fraction is visually estimated to be 35%.   Hypokinesis of the apex, inferolateral wall, and anterolateral wall.  No thrombus. Normal inferior vena cava size without inspiratory collapse. Estimated right ventricular systolic pressure is 45 mmHg.  Mild to moderate mitral regurgitation. CHERISETATYANA JIMENES Exam Date:         2019                    09:02 Exam Location:     Inpatient Priority:          Routine Ordering Physician:        SEBASTIAN SULLIVAN Referring Physician: Sonographer:               Clarissa Mendenhall RVT, RDCS Age:    79     Gender:    M MRN:    [id number] :    1940 BSA:    2.34   Ht (in):    72     Wt (lb):    250 Exam Type:     Complete, Contrast Indications:     Edema ICD Codes:       782.3 CPT Codes:       51331 BP:   122    /   66     HR:   80 Technical Quality:       Fair MEASUREMENTS  (Male / Female) Normal Values 2D ECHO LV Diastolic Diameter PLAX        5.5 cm                4.2 - 5.9 / 3.9 - 5.3 cm LV Systolic Diameter PLAX         4.3 cm                2.1 - 4.0 cm IVS Diastolic Thickness           0.97 cm               LVPW Diastolic Thickness          0.94 cm               LVOT Diameter                     2.2 cm                Estimated LV Ejection Fraction    35 %                  LV Ejection Fraction MOD 4C       32.1 %                IVC Diameter                      1.8 cm                DOPPLER AV Peak Velocity                  1.6 m/s               AV Peak Gradient                  10.5 mmHg             AV Mean Gradient                  6.3 mmHg              LVOT Peak Velocity                0.67 m/s              AV Area Cont Eq vti               1.7 cm²               Mitral E Point Velocity           1 m/s                 Mitral E to A Ratio               1.1                   Mitral A Duration                 129 ms                MV Pressure Half Time             41.1 ms               MV Area PHT                       5.3 cm²                MV Deceleration Time              142 ms                MR ERO PISA                       0.16 cm²              MR Regurgitant Volume PISA        27.6 cm³              TR Peak Velocity                  304 cm/s              PV Peak Velocity                  0.86 m/s              PV Peak Gradient                  3 mmHg                RVOT Peak Velocity                0.75 m/s              * Indicates values subject to auto-interpretation LV EF:  35    % FINDINGS Left Ventricle The left ventricle was normal in size and thickness. Moderately reduced left ventricular systolic function. Hypokinesis of the apex, inferolateral wall, and anterolateral wall. Left ventricular ejection fraction is visually estimated to be 35%. Grade II diastolic dysfunction. 3 mL of contrast was used to evaluate for thrombus in the left ventricular apex. No apical thrombus seen. Existing IV was used at the left wrist. Right Ventricle Right ventricle not well visualized. Right Atrium The right atrium is normal in size.  Normal inferior vena cava size without inspiratory collapse. Left Atrium Mildly dilated left atrium. Left atrial volume index is 38 mL/sq m. Mitral Valve Mitral annular calcification. No mitral stenosis. Mild to moderate mitral regurgitation. ERO by PISA method is 0.16 sq cm and vena contracta is 0.5 cm. Aortic Valve Tricuspid aortic valve. Aortic sclerosis without stenosis. No aortic insufficiency.  The aortic valve is not well visualized. Tricuspid Valve Structurally normal tricuspid valve without significant stenosis. Mild tricuspid regurgitation. Estimated right ventricular systolic pressure is 45 mmHg. Pulmonic Valve The pulmonic valve is not well visualized. Pericardium No effusion. Aorta Normal aortic root for body surface area. Ryann Chaudhry M.D. (Electronically Signed) Final Date:     27 June 2019                 12:40    Ir-picc Line Placement W/ Guidance > Age 5    Result Date:  7/2/2019  HISTORY/REASON FOR EXAM:   PICC placement. TECHNIQUE/EXAM DESCRIPTION AND NUMBER OF VIEWS:   PICC line insertion with ultrasound guidance.  The procedure was performed using maximal sterile barrier technique including sterile gown, mask, cap, and donning of sterile gloves following appropriate hand hygiene and/or sterile scrub. Patient skin site was prepped with 2% Chlorhexidine solution. FINDINGS:  PICC line insertion with Ultrasound Guidance was performed by qualified nursing staff without the assistance of a Radiologist. PICC positioning appropriateness confirmed by 3CG technology; chest xray only needed in the instance 3CG unable to confirm placement.              Ultrasound-guided PICC placement performed by qualified nursing staff as above.       Micro:  Results     Procedure Component Value Units Date/Time    CULTURE TISSUE W/ GRM STAIN [[id number]] Collected:  06/28/19 1647    Order Status:  Completed Specimen:  Tissue Updated:  07/03/19 1348     Significant Indicator NEG     Source TISS     Site 2nd and 3rd Left Metatarsals     Culture Result No growth at 72 hours.     Gram Stain Result No organisms seen.    Narrative:       Surgery Specimen    AFB Culture [[id number]] Collected:  06/28/19 1647    Order Status:  Completed Specimen:  Tissue Updated:  07/03/19 1348     Significant Indicator NEG     Source TISS     Site 2nd and 3rd Left Metatarsals     Culture Result Culture in progress.     AFB Smear Results No acid fast bacilli seen.    Narrative:       Surgery Specimen    Anaerobic Culture [[id number]] Collected:  06/28/19 1647    Order Status:  Completed Specimen:  Tissue Updated:  07/03/19 1348     Significant Indicator NEG     Source TISS     Site 2nd and 3rd Left Metatarsals     Culture Result No Anaerobes isolated.    Narrative:       Surgery Specimen    Fungal Culture [[id number]] Collected:  06/28/19 1647    Order Status:  Completed Specimen:  Tissue Updated:  07/03/19 1348     Significant  Indicator NEG     Source TISS     Site 2nd and 3rd Left Metatarsals     Culture Result No fungal growth to date.    Narrative:       Surgery Specimen    AFB Culture [[id number]] Collected:  06/28/19 1658    Order Status:  Completed Specimen:  Tissue Updated:  07/02/19 1004     Significant Indicator NEG     Source TISS     Site 5th Right Metatarsal     Culture Result Culture in progress.     AFB Smear Results No acid fast bacilli seen.    Narrative:       Surgery Specimen    Fungal Culture [[id number]] Collected:  06/28/19 1658    Order Status:  Completed Specimen:  Tissue Updated:  07/02/19 1004     Significant Indicator NEG     Source TISS     Site 5th Right Metatarsal     Culture Result No fungal growth to date.    Narrative:       Surgery Specimen    Anaerobic Culture [[id number]] Collected:  06/28/19 1658    Order Status:  Completed Specimen:  Tissue Updated:  07/02/19 1004     Significant Indicator NEG     Source TISS     Site 5th Right Metatarsal     Culture Result No Anaerobes isolated.    Narrative:       Surgery Specimen    CULTURE TISSUE W/ GRM STAIN [[id number]]  (Abnormal)  (Susceptibility) Collected:  06/28/19 1658    Order Status:  Completed Specimen:  Tissue Updated:  07/02/19 1004     Significant Indicator POS (POS)     Source TISS     Site 5th Right Metatarsal     Culture Result Growth noted after further incubation, see below for  organism identification.   (A)     Gram Stain Result No organisms seen.     Culture Result Enterococcus faecalis  Rare growth  Combination therapy with ampicillin, penicillin, or  vancomycin (for susceptible strains) plus an aminoglycoside  is usually indicated for serious enterococcal infections,  such as endocarditis unless high-level resistance to both  gentamicin and streptomycin is documented; such combinations  are predicted to result in synergistic killing of the  Enterococcus.   (A)    Narrative:       Surgery Specimen    Culture & Susceptibility     ENTEROCOCCUS  FAECALIS     Antibiotic Sensitivity Microscan Unit Status    Ampicillin Sensitive <=2 mcg/mL Final    Method: PETTY    Daptomycin Sensitive 1 mcg/mL Final    Method: PETTY    Gent Synergy Sensitive <=500 mcg/mL Final    Method: PETTY    Penicillin Sensitive 2 mcg/mL Final    Method: PETTY    Vancomycin Sensitive 2 mcg/mL Final    Method: PETTY                             Assessment:  Active Hospital Problems    Diagnosis   • Acute systolic CHF (congestive heart failure) ([facility]) [I50.21]   • Diabetic foot infection ([facility]) [E11.628, L08.9]   • Hypernatremia [E87.0]   • Generalized weakness [R53.1]   • Oropharyngeal dysphagia [R13.12]   • DNR (do not resuscitate) [Z66]   • Cardiomyopathy ([facility]) [I42.9]   • Elevated troponin [R74.8]   • Macrocytosis [D75.89]   • Venous stasis dermatitis of both lower extremities [I87.2]   • CKD (chronic kidney disease), stage III ([facility]) [N18.3]   • CAD (coronary artery disease) [I25.10]   • Edema [R60.9]   • COPD (chronic obstructive pulmonary disease) ([facility]) [J44.9]   • Mixed hyperlipidemia [E78.2]   • Essential hypertension, benign [I10]   • Type 2 diabetes mellitus with kidney complication, without long-term current use of insulin ([facility]) [E11.29]     Interval 24 hour assessment:    Events  AF, O2 4 L NC, overall stable     Labs reviewed     Micro reviewed    Pt continued on Unasyn   Pt doing well with no complaints.  Discussed wounds with nurse who was there for dressing change yesterday and improvement. Some areas of possible pressure injury from boot and foam padding aplied.     Plan:  Bilateral diabetic foot ulcers with underlying osteomyelitis, on treatment  Afebrile  No leukocytosis  Blood cultures on 6/25 negative  Underwent bilateral tendon Achilles lengthening, bilateral second third fourth and fifth tenotomies, right fifth metatarsal head excision, left second and third metatarsal head excision on 6/28 with Dr. Cooper.  OR culture of right fifth metatarsal +E faecalis  Being followed by  [facility]  Continue IV Unasyn 3 g every 6 hours  Plan for 6 weeks IV antibiotics due to positive OR bone culture  Estimated end date 8/9/2019  Will need weekly CBC and CMP      Uncontrolled type 2 diabetes mellitus  Hemoglobin A1c 10.3% on 6/26/2019  Will adversely affect wound healing and resolution of infection     Stage III chronic kidney disease.  Stable  CrCl~ 50-55  Renally adjust antibiotics as needed  Avoid nephrotoxins  Continue to monitor closely    Plan is for discharge to [facility] in [place] to continue IV antibiotics today. ID will sign off. Follow-up in ID clinic after completing therapy.     Discussed with nurse.

## 2019-07-09 NOTE — PROGRESS NOTES
Patient alert and oriented, vss, denies pain. Patient CO2 high- per aleta APRN this is not affecting patient mentation at this point and patient has been stable with these numbers so will monitor for now. Sodium elevated and will continue to encourage water intake per MD note. Refer to skin note for assessment and interventions. Patient plan to d/c to [facility] at 11am tomorrow for continued IV antibiotics. Will monitor.

## 2019-07-09 NOTE — PROGRESS NOTES
2 RN skin check completed with Rozina RAZA.   Devices in place condom cath, nasal cannula, tubigrips on both and bilateral off loading boots on.  Skin assessed under devices yes  Confirmed pressure ulcers found on : L diabetic foot ulcers and L and R plantar region.   New potential pressure ulcers noted and Wound consult placed yes by day RN.   R back of ears is red and blanching, L side of back of ear appears less red compare to recent photo obtained and blanching. L cheek bone purplish to red. Bilateral elbows has some redness with some scabbing both blanching. Umbilical hernia noted, pt. Has no complaints of pain. Sacral/coccyx area has some redness with excoriation. Groin area has some redness. Penis appears less red. Abrasions on bilateral knee area, no drainage noted.     L knee with dressing in place. L lower extremity anterior portion has some redness with L lateral side of leg redness not blanching, incisions on posterior of leg dry with dressing, L ankle and L medial malleolus has some redness but blanching, L lateral malleolus also red non blanching, L anterior of foot with redness, L plantar foot has dressings on with yellowish drainage from incision site, top of L great toe has some redness but blanching.     R knee has some scabbing, incisions noted on posterior of R leg, L medial and lateral malleolus has some redness both blanching, anterior portion of the R foot has spot of red that is blanching. R planter incision site with some yellowish discharge covered with dressing only scant drainage no need to change dressings for both.     The following interventions in place: Q2 turns in place, waffle overlay in place, nasal cannula pads including on the back of both ears and L cheek area. Mepilex lite/ mepilexes applied to areas of redness. Condom cath secured and to contain urine, barrier cream applied to groin and coccyx area. Linens change and kept dry. Maintained dressings in place.

## 2019-07-09 NOTE — PROGRESS NOTES
Assumed care of pt this am. Pt is A&O x4. Bed alarm in use. Pt educated to call for assistance. Hourly rounding in place.

## 2019-07-09 NOTE — DISCHARGE SUMMARY
Discharge Summary    CHIEF COMPLAINT ON ADMISSION  Chief Complaint   Patient presents with   • Foot Swelling     BL LE swelling and edema w wounds to feet bilaterally   • Shoulder Pain     Pt states he fell one or two days ago and is having right shoulder pain   • Fall   • Barky Cough     Pt 88% on room air w/ ems and requiring 2L NC to maintain saturations over 90%       Reason for Admission  ems     Admission Date  6/25/2019    Discharge Date  07/09/19    CODE STATUS  DNAR/DNI    HPI & HOSPITAL COURSE  This is a 79 y.o. male with a past medical history of COPD, CAD, HTN, DM, HLD. Patient follows with outpatient wound care for chronic, nonhealing bilateral diabetic foot wounds.  Presented to ED after developing worsening erythema and swelling of bilateral lower extremities as well as purulent drainage from foot wounds.  He was initiated on antibiotics.  Infectious disease and limb preservation services were consulted.  He underwent metatarsal head resection of right 5th and left 2nd and 3rd on 6/28/2019.  His OR culture was positive for E. Faecalis.  Per infectious disease, he will need to complete 6 weeks of IV Unasyn with a stop date of 8/9/2019.  A PICC line has been placed and remained in at time of DC for ongoing IV ABX.      His hemoglobin A1c was found to be 10.3.  Due to his infection and nonhealing wounds, he is recommended to maintain tight glycemic control.  He initiated on Lantus 10 mg daily with sliding scale insulin.  Accu-checks past 3 days .      Over his hospital stay he was also found to have a new diagnosis of CHF with EF of 35% on echocardiogram.  He was noted to have mild volume overload on admission with BLE edema, improved with diuresis.  He was evaluated by cardiology.  His new diagnosis of heart failure is suspected to be secondary to ischemic cardiomyopathy with known severe CAD with multivessel disease.  Per cardiology, he is a poor surgical candidate secondary to his underlying  comorbidities.  He will continue aggressive medical management.  He was initiated on Torsemide 100 mg daily.  This was reduced to 50 mg daily secondary to fluctuating hypernatremia, suspected to be related to his poor oral fluid intake.  He is also maintained on Coreg and Losartan.  Aldactone is currently being held as his blood pressures currently run soft.      He is also noted to have underlying dysphagia, although he refuses FEES for aspiration evaluation.  The patient wishes to eat despite aspiration risk.  He understands these risks and continues to demand an oral diet.  The physician had extensive discussion of overall prognosis with family and patient.  The decision was made to transition to DNR status.           He was seen and examined prior to discharge.  He is on 4 L nasal cannula with O2 sat 95 to 98%.  He denies SOB and reports adequate pain control.  [facility] [place] [facility] continued IV antibiotics, wound care, and therapies.    [facility] anticipate need for follow-up with cardiology, orthopedic surgery, and infectious disease.    Therefore, he is discharged in good and stable condition to a long-term acute care hospital.    The patient met 2-midnight criteria for an inpatient stay at the time of discharge.    DISCHARGE DIAGNOSES  Active Problems:    Diabetic foot infection (HCC) POA: Unknown    Acute systolic CHF (congestive heart failure) ([facility]) POA: Unknown    Hypernatremia POA: Unknown    Essential hypertension, benign POA: Yes    Type 2 diabetes mellitus with kidney complication, without long-term current use of insulin ([facility]) POA: Yes    COPD (chronic obstructive pulmonary disease) ([facility]) POA: Yes    Edema POA: Yes    Mixed hyperlipidemia POA: Yes    CAD (coronary artery disease) POA: Yes    CKD (chronic kidney disease), stage III ([facility]) POA: Yes    Elevated troponin POA: Unknown    Macrocytosis POA: Unknown    Venous stasis dermatitis of  both lower extremities POA: Unknown    Cardiomyopathy (HCC) POA: Unknown    DNR (do not resuscitate) POA: Unknown    Oropharyngeal dysphagia POA: Yes    Generalized weakness POA: Unknown  Resolved Problems:    Hypokalemia POA: Unknown      FOLLOW UP  Future Appointments  Date Time Provider Department Center   7/19/2019 8:00 AM Tomas Venegas M.D. [facility] [address]   8/8/2019 12:40 PM Destiny Chaudhry P.A.-C. [facility] None     No follow-up provider specified.    MEDICATIONS ON DISCHARGE     Medication List      START taking these medications      Instructions   glucose 4 g chewable tablet   Take 4 Tabs by mouth as needed for Low Blood Sugar (If FSBG is less than or equal to 70 mg/dL and patient able to eat or drink).  Dose:  16 g     insulin glargine 100 UNIT/ML Soln  Commonly known as:  LANTUS   Inject 10 Units as instructed every evening.  Dose:  10 Units     insulin regular 100 Unit/mL Soln  Commonly known as:  HUMULIN R   Inject 2-9 Units as instructed 3 times a day before meals.  Dose:  2-9 Units     losartan 25 MG Tabs  Commonly known as:  COZAAR   Take 1 Tab by mouth every day.  Dose:  25 mg     NS SOLN 100 mL with ampicillin/sulbactam 3 (2-1) g SOLR 3 g   3 g by Intravenous route every 6 hours.  Dose:  3 g     torsemide 10 MG tablet  Commonly known as:  DEMADEX   Take 5 Tabs by mouth every day.  Dose:  50 mg        CHANGE how you take these medications      Instructions   carvedilol 6.25 MG Tabs  What changed:  · medication strength  · how much to take  Commonly known as:  COREG   Take 1 Tab by mouth 2 times a day, with meals.  Dose:  6.25 mg     potassium chloride SA 20 MEQ Tbcr  What changed:  · medication strength  · how much to take  Commonly known as:  Kdur   Take 2 Tabs by mouth every day.  Dose:  40 mEq        CONTINUE taking these medications      Instructions   aspirin 81 MG tablet   Take 81 mg by mouth every day.  Dose:  81 mg     atorvastatin 20 MG Tabs  Commonly known as:  LIPITOR   Take 1 Tab by  mouth every day.  Dose:  20 mg     famotidine 20 MG Tabs  Commonly known as:  PEPCID   Take 20 mg by mouth every day.  Dose:  20 mg        STOP taking these medications    cephALEXin 500 MG Caps  Commonly known as:  KEFLEX     furosemide 40 MG Tabs  Commonly known as:  LASIX     metFORMIN  MG Tb24  Commonly known as:  GLUCOPHAGE XR            Allergies  Allergies   Allergen Reactions   • Other Misc Rash     Cat Hair       DIET  Orders Placed This Encounter   Procedures   • Diet Order Diabetic     Standing Status:   Standing     Number of Occurrences:   1     Order Specific Question:   Diet:     Answer:   Diabetic [3]     Order Specific Question:   Texture/Fiber modifications:     Answer:   Dysphagia 2(Pureed/Chopped)specify fluid consistency(question 6) [2]     Order Specific Question:   Consistency/Fluid modifications:     Answer:   Nectar Thick [2]       ACTIVITY  As tolerated.  Weight bearing as tolerated    CONSULTATIONS  Orthopedic Surgery - Dr. Cooper  Infectious Disease - Dr. Moreau  Limb preservation services  Cardiology - Dr. Olivier    PROCEDURES  6/28/2019:  1.  Bilateral tendo-Achilles lengthening.  2.  Bilateral second, third, fourth and fifth percutaneous flexor tenotomies.  3.  Right fifth metatarsal head excision.  4.  Left second metatarsal head excision.  5.  Left third metatarsal head excision.  6.  Irrigation and debridement of skin, subcutaneous tissue to fascia, left   lower extremity.  7.  Irrigation and debridement of skin, subcutaneous tissue to bone, right   lower extremity.    LABORATORY  Lab Results   Component Value Date    SODIUM 147 (H) 07/08/2019    POTASSIUM 3.5 (L) 07/08/2019    CHLORIDE 98 07/08/2019    CO2 43 (HH) 07/08/2019    GLUCOSE 88 07/08/2019    BUN 27 (H) 07/08/2019    CREATININE 1.34 07/08/2019        Lab Results   Component Value Date    WBC 6.8 07/06/2019    HEMOGLOBIN 9.2 (L) 07/06/2019    HEMATOCRIT 30.4 (L) 07/06/2019    PLATELETCT 149 (L) 07/06/2019         Total time of the discharge process exceeds 38 minutes.  IVORY Mcclain.

## 2019-07-09 NOTE — CARE PLAN
Problem: Safety  Goal: Will remain free from falls  Outcome: PROGRESSING AS EXPECTED  Bed alarm in place, call light placed within reach.     Problem: Venous Thromboembolism (VTW)/Deep Vein Thrombosis (DVT) Prevention:  Goal: Patient will participate in Venous Thrombosis (VTE)/Deep Vein Thrombosis (DVT)Prevention Measures  Outcome: PROGRESSING AS EXPECTED   07/08/19 7105   OTHER   Risk Assessment Score 3   VTE RISK High   Pharmacologic Prophylaxis Used Unfractionated Heparin       Problem: Respiratory:  Goal: Respiratory status will improve  Outcome: PROGRESSING AS EXPECTED  Pt. On oxygen via NC, maintained oxygen saturation above 9)%.      Problem: Skin Integrity  Goal: Risk for impaired skin integrity will decrease  Outcome: PROGRESSING AS EXPECTED  2 RN skin checks done with the pt. Float heel boots on.

## 2019-07-09 NOTE — PROGRESS NOTES
Received pt. In bed awake, alert and orientedx3-4, denies any complaint of pain/discomfort at the time of assessment. Pt. Noted to have multiple wounds all accounted per per report and wound consult in place for new ones. Pt. Has PICC line FAYE on TKO for IV abx therapy. Condom cath in place. Pt. On O2 via NC at 3LPM with sats above 90% and tolerating well. Fall prec in place, call light placed within reach. Pt. Assisted during dinner time, pt. Noted to have productive cough, needs attended.

## 2019-07-09 NOTE — CARE PLAN
Problem: Safety  Goal: Will remain free from injury  Outcome: PROGRESSING AS EXPECTED  High fall risk per assessment, bed alarm in place and education provided.

## 2019-07-19 ENCOUNTER — APPOINTMENT (OUTPATIENT)
Dept: WOUND CARE | Facility: MEDICAL CENTER | Age: 79
End: 2019-07-19
Attending: NURSE PRACTITIONER
Payer: MEDICARE

## 2019-07-23 LAB
FUNGUS SPEC CULT: NORMAL
FUNGUS SPEC CULT: NORMAL
SIGNIFICANT IND 70042: NORMAL
SIGNIFICANT IND 70042: NORMAL
SITE SITE: NORMAL
SITE SITE: NORMAL
SOURCE SOURCE: NORMAL
SOURCE SOURCE: NORMAL

## 2019-08-06 ENCOUNTER — TELEPHONE (OUTPATIENT)
Dept: INFECTIOUS DISEASES | Facility: MEDICAL CENTER | Age: 79
End: 2019-08-06

## 2019-08-06 NOTE — TELEPHONE ENCOUNTER
Called and LM for pt to return my call to schedule a hospital follow up appointment with Infectious Disease.  If pt was followed by [facility] while at [facility], pt does not need [facility] with [facility] per Dr. Guadarrama.  -AMP

## 2019-08-07 ENCOUNTER — TELEPHONE (OUTPATIENT)
Dept: INFECTIOUS DISEASES | Facility: MEDICAL CENTER | Age: 79
End: 2019-08-07

## 2019-08-07 NOTE — TELEPHONE ENCOUNTER
Called and spoke to RN taking care of pt at [facility]. RN did not know when pt would be discharged, but he has been seen by another Infectious Disease practice.  Ok to not follow up per Dr. Guadarrama.  -AMP

## 2019-08-24 LAB
MYCOBACTERIUM SPEC CULT: NORMAL
MYCOBACTERIUM SPEC CULT: NORMAL
RHODAMINE-AURAMINE STN SPEC: NORMAL
RHODAMINE-AURAMINE STN SPEC: NORMAL
SIGNIFICANT IND 70042: NORMAL
SIGNIFICANT IND 70042: NORMAL
SITE SITE: NORMAL
SITE SITE: NORMAL
SOURCE SOURCE: NORMAL
SOURCE SOURCE: NORMAL

## (undated) DEVICE — TRAY SKIN SCRUB PVP WET (20EA/CA) PART #DYND70356 DISCONTINUED

## (undated) DEVICE — GLOVE BIOGEL SZ 6.5 SURGICAL PF LTX (50PR/BX 4BX/CA)

## (undated) DEVICE — BOVIE BLADE COATED - (50/PK)

## (undated) DEVICE — BLADE SURGICAL #15 - (50/BX 3BX/CA)

## (undated) DEVICE — GLOVE BIOGEL ECLIPSE PF LATEX SIZE 9.0

## (undated) DEVICE — DRAPE LARGE 3 QUARTER - (20/CA)

## (undated) DEVICE — SUTURE GENERAL

## (undated) DEVICE — DRESSING 3X8 ADAPTIC GAUZE - NON-ADHERING (36/PK 6PK/BX)

## (undated) DEVICE — SPONGE GAUZESTER 4 X 4 4PLY - (128PK/CA)

## (undated) DEVICE — HEAD HOLDER JUNIOR/ADULT

## (undated) DEVICE — STOCKINET TUBULAR 6IN STERILE - 6 X 48YDS (25/CA)

## (undated) DEVICE — PADDING CAST 4 IN STERILE - 4 X 4 YDS (24/CA)

## (undated) DEVICE — GLOVE BIOGEL INDICATOR SZ 7SURGICAL PF LTX - (50/BX 4BX/CA)

## (undated) DEVICE — SHEET BILATERAL 76X120 - (12/CA)

## (undated) DEVICE — SUTURE 3-0 VICRYL PLUS SH - 8X 18 INCH (12/BX)

## (undated) DEVICE — GOWN SURGEONS X-LARGE - DISP. (30/CA)

## (undated) DEVICE — DRESSING ABDOMINAL PAD STERILE 8 X 10" (360EA/CA)"

## (undated) DEVICE — GLOVE BIOGEL INDICATOR SZ 6.5 SURGICAL PF LTX - (50PR/BX 4BX/CA)

## (undated) DEVICE — DRAPE C-ARM LARGE 41IN X 74 IN - (10/BX 2BX/CA)

## (undated) DEVICE — GLOVE BIOGEL PI INDICATOR SZ 7.0 SURGICAL PF LF - (50/BX 4BX/CA)

## (undated) DEVICE — BLADE SAGITTAL SAW 9.4MM X 25.5MM X .4MM FINE TOOTH (1/EA)

## (undated) DEVICE — KIT ANESTHESIA W/CIRCUIT & 3/LT BAG W/FILTER (20EA/CA)

## (undated) DEVICE — KIT ROOM DECONTAMINATION

## (undated) DEVICE — LACTATED RINGERS INJ 1000 ML - (14EA/CA 60CA/PF)

## (undated) DEVICE — CHLORAPREP 26 ML APPLICATOR - ORANGE TINT(25/CA)

## (undated) DEVICE — SUTURE 4-0 PROLENE PS-2 18 (36PK/BX)"

## (undated) DEVICE — SENSOR SPO2 NEO LNCS ADHESIVE (20/BX) SEE USER NOTES

## (undated) DEVICE — STOCKINETTE, TUBULAR 4 COTTON

## (undated) DEVICE — SUCTION INSTRUMENT YANKAUER BULBOUS TIP W/O VENT (50EA/CA)

## (undated) DEVICE — PROTECTOR ULNA NERVE - (36PR/CA)

## (undated) DEVICE — TOURNIQUET CUFF 34 X 4 ONE PORT DISP - STERILE (10/BX)

## (undated) DEVICE — GLOVE BIOGEL INDICATOR SZ 8.5 SURGICAL PF LTX - (50/BX 4BX/CA)

## (undated) DEVICE — CONTAINER SPECIMEN BAG OR - STERILE 4 OZ W/LID (100EA/CA)

## (undated) DEVICE — NEPTUNE 4 PORT MANIFOLD - (20/PK)

## (undated) DEVICE — TUBING CLEARLINK DUO-VENT - C-FLO (48EA/CA)

## (undated) DEVICE — ELECTRODE DUAL RETURN W/ CORD - (50/PK)

## (undated) DEVICE — GLOVE BIOGEL PI INDICATOR SZ 6.5 SURGICAL PF LF - (50/BX 4BX/CA)

## (undated) DEVICE — CANISTER SUCTION 3000ML MECHANICAL FILTER AUTO SHUTOFF MEDI-VAC NONSTERILE LF DISP  (40EA/CA)

## (undated) DEVICE — SET EXTENSION WITH 2 PORTS (48EA/CA) ***PART #2C8610 IS A SUBSTITUTE*****

## (undated) DEVICE — SODIUM CHL IRRIGATION 0.9% 1000ML (12EA/CA)

## (undated) DEVICE — PAD LAP STERILE 18 X 18 - (5/PK 40PK/CA)

## (undated) DEVICE — CORDS BIPOLAR COAGULATION - 12FT STERILE DISP. (10EA/BX)

## (undated) DEVICE — GLOVE BIOGEL ECLIPSE  PF LATEX SIZE 6.5 (50PR/BX)

## (undated) DEVICE — PACK LOWER EXTREMITY - (2/CA)

## (undated) DEVICE — GLOVE BIOGEL ECLIPSE PF LATEX SIZE 8.0  (50PR/BX)

## (undated) DEVICE — DETERGENT RENUZYME PLUS 10 OZ PACKET (50/BX)

## (undated) DEVICE — BANDAGE ELASTIC 4 HONEYCOMB - 4"X5YD LF (20/CA)"

## (undated) DEVICE — SUTURE 3-0 ETHILON PS-1 (36PK/BX)

## (undated) DEVICE — GLOVE BIOGEL PI INDICATOR SZ 8.0 SURGICAL PF LF -(50/BX 4BX/CA)

## (undated) DEVICE — MASK ANESTHESIA ADULT  - (100/CA)

## (undated) DEVICE — GLOVE SZ 6.5 BIOGEL PI MICRO - PF LF (50PR/BX)

## (undated) DEVICE — TOWELS CLOTH SURGICAL - (4/PK 20PK/CA)

## (undated) DEVICE — BOVIE NEEDLE TIP INSULATD NON-SAFETY 2CM (50/PK)

## (undated) DEVICE — Device

## (undated) DEVICE — SUTURE 3-0 ETHILON FS-1 - (36/BX) 30 INCH

## (undated) DEVICE — ELECTRODE 850 FOAM ADHESIVE - HYDROGEL RADIOTRNSPRNT (50/PK)

## (undated) DEVICE — GOWN WARMING STANDARD FLEX - (30/CA)

## (undated) DEVICE — WATER IRRIG. STER. 1500 ML - (9/CA)

## (undated) DEVICE — GLOVE BIOGEL SZ 6 PF LATEX - (50EA/BX 4BX/CA)

## (undated) DEVICE — SET LEADWIRE 5 LEAD BEDSIDE DISPOSABLE ECG (1SET OF 5/EA)